# Patient Record
Sex: FEMALE | Race: WHITE | NOT HISPANIC OR LATINO | Employment: FULL TIME | URBAN - METROPOLITAN AREA
[De-identification: names, ages, dates, MRNs, and addresses within clinical notes are randomized per-mention and may not be internally consistent; named-entity substitution may affect disease eponyms.]

---

## 2017-01-04 ENCOUNTER — TELEPHONE (OUTPATIENT)
Dept: FAMILY MEDICINE | Facility: CLINIC | Age: 52
End: 2017-01-04

## 2017-01-04 NOTE — TELEPHONE ENCOUNTER
Please notify that the hep C virus was not detected in her system. It appears that she was exposed, and then cleared the infection.

## 2017-01-05 NOTE — TELEPHONE ENCOUNTER
Pt informed of test results.  Pt want to me to confirm with Dr. Nassar that based on her FSH that does mean she CAN NOT get pregnant.

## 2017-06-20 ENCOUNTER — OFFICE VISIT (OUTPATIENT)
Dept: OBSTETRICS AND GYNECOLOGY | Facility: CLINIC | Age: 52
End: 2017-06-20
Payer: COMMERCIAL

## 2017-06-20 VITALS
WEIGHT: 162.69 LBS | DIASTOLIC BLOOD PRESSURE: 78 MMHG | BODY MASS INDEX: 31.78 KG/M2 | SYSTOLIC BLOOD PRESSURE: 118 MMHG

## 2017-06-20 DIAGNOSIS — Z86.73 H/O: CVA (CEREBROVASCULAR ACCIDENT): ICD-10-CM

## 2017-06-20 DIAGNOSIS — Z12.4 ENCOUNTER FOR PAP SMEAR OF CERVIX WITH HPV DNA COTESTING: Primary | ICD-10-CM

## 2017-06-20 DIAGNOSIS — Z78.0 MENOPAUSE: ICD-10-CM

## 2017-06-20 PROCEDURE — 99396 PREV VISIT EST AGE 40-64: CPT | Mod: S$GLB,,, | Performed by: OBSTETRICS & GYNECOLOGY

## 2017-06-20 PROCEDURE — 88175 CYTOPATH C/V AUTO FLUID REDO: CPT

## 2017-06-20 PROCEDURE — 99999 PR PBB SHADOW E&M-EST. PATIENT-LVL III: CPT | Mod: PBBFAC,,, | Performed by: OBSTETRICS & GYNECOLOGY

## 2017-06-20 NOTE — LETTER
June 20, 2017      Emily Nassar MD  [address]  [facility] [address]           [facility] at [address], [address]  [facility] [address]  Phone: [phone]  Fax: [phone]          Patient: Janice Ramos   MR Number: [id number]   YOB: 1965   Date of Visit: 6/20/2017       Dear Dr. Emily Nassar:    Thank you for referring Janice Ramos to me for evaluation. Attached you will find relevant portions of my assessment and plan of care.    If you have questions, please do not hesitate to call me. I look forward to following Janice Ramos along with you.    Sincerely,    Loree Mesa MD    Enclosure  CC:  No Recipients    If you would like to receive this communication electronically, please contact [email or web address] or [phone] to request more information on [employer] access.    For providers and/or their staff who would like to refer a patient to [facility], please contact us through our one-stop-shop provider referral line, [facility], at [phone].    If you feel you have received this communication in error or would no longer like to receive these types of communications, please e-mail [email or web address]

## 2017-06-20 NOTE — PROGRESS NOTES
Chief Complaint   Patient presents with    [facility]    Well Woman     pap        History of Present Illness: Janice Ramos is a 52 y.o. female that presents today 6/20/2017 for well gyn visit.    Past Medical History:   Diagnosis Date    Anxiety     Bell's palsy 2007    R eye with residual deficit    Fatigue     Stroke 12/2014    Vertigo        Past Surgical History:   Procedure Laterality Date    TONSILLECTOMY         Current Outpatient Prescriptions   Medication Sig Dispense Refill    aspirin (ECOTRIN) 81 MG EC tablet Take 81 mg by mouth once daily.      atorvastatin (LIPITOR) 80 MG tablet Take 1 tablet (80 mg total) by mouth once daily. 90 tablet 1    cetirizine (ZYRTEC) 10 MG tablet Take 10 mg by mouth once daily.      escitalopram oxalate (LEXAPRO) 20 MG tablet TAKE ONE TABLET BY MOUTH ONCE DAILY 30 tablet 0    meclizine (ANTIVERT) 12.5 mg tablet Take 1 tablet (12.5 mg total) by mouth 3 (three) times daily as needed. 30 tablet 0    multivitamin (THERAGRAN) per tablet Take 1 tablet by mouth once daily.      b complex vitamins tablet Take 1 tablet by mouth once daily.      fluticasone (FLONASE) 50 mcg/actuation nasal spray 1 spray by Each Nare route once daily. 1 Bottle 11     No current facility-administered medications for this visit.        Review of patient's allergies indicates:   Allergen Reactions    Codeine Nausea Only       Family History   Problem Relation Age of Onset    Cancer Mother      cervical ca    Cancer Maternal Grandmother      colon ca    Heart disease Brother      arrythmia       Social History     Social History    Marital status:      Spouse name: N/A    Number of children: N/A    Years of education: N/A     Occupational History     [employer]     Social History Main Topics    Smoking status: Former Smoker    Smokeless tobacco: None    Alcohol use Yes    Drug use: No    Sexual activity: Yes     Partners: Male     Other Topics  Concern    None     Social History Narrative    Works in [employer] at [employer].       OB History    Para Term  AB Living   2 2    2   SAB TAB Ectopic Multiple Live Births             # Outcome Date GA Lbr Anthony/2nd Weight Sex Delivery Anes PTL Lv   2 Para      CS-Unspec      1 Para      CS-Unspec             Review of Symptoms:  GENERAL: Denies weight gain or weight loss. Feeling well overall.   SKIN: Denies rash or lesions.   HEAD: Denies head injury or headache.   NODES: Denies enlarged lymph nodes.   CHEST: Denies chest pain or shortness of breath.   CARDIOVASCULAR: Denies palpitations or left sided chest pain.   ABDOMEN: No abdominal pain, constipation, diarrhea, nausea, vomiting or rectal bleeding.   URINARY: No frequency, dysuria, hematuria, or burning on urination.  HEMATOLOGIC: No easy bruisability or excessive bleeding.   MUSCULOSKELETAL: Denies joint pain or swelling.     /78   Wt 73.8 kg (162 lb 11.2 oz)   LMP 2013   Physical Exam:  APPEARANCE: Well nourished, well developed, in no acute distress.  SKIN: Normal skin turgor, no lesions.  NECK: Neck symmetric without masses   RESPIRATORY: Normal respiratory effort with no retractions or use of accessory muscles  CARDIOVASCULAR: Peripheral vascular system with no swelling no varicosities and palpation of pulses normal  LYMPHATIC: No enlargements of the lymph nodes noted in the neck, axillae, or groin  ABDOMEN: Soft. No tenderness or masses. No hepatosplenomegaly. No hernias.  BREASTS: Symmetrical, no skin changes or visible lesions. No palpable masses, nipple discharge or adenopathy bilaterally.  PELVIC: Normal external female genitalia without lesions. Normal hair distribution. Adequate perineal body, normal urethral meatus. Urethra with no masses.  Bladder nontender. Vagina moist and well rugated without lesions or discharge. Cervix pink and without lesions. No significant cystocele or rectocele. Bimanual exam showed uterus  normal size, shape, position, mobile and nontender. Adnexa without masses or tenderness. Urethra and bladder normal. PAP DONE  EXTREMITIES: No clubbing cyanosis or edema.    ASSESSMENT/PLAN:  Encounter for Pap smear of cervix with HPV DNA cotesting  -     Liquid-based pap smear, screening  -     HPV High Risk Genotypes, PCR    H/O: CVA (cerebrovascular accident)    Menopause          Patient was counseled today on Pap guidelines, recommendation for pelvic exams, mammograms every other year after the age of 40 and annually after the age of 50, Colonoscopy after the age of 50, Dexa Bone Scan and calcium and vitamin D supplementation in menopause and to see her PCP for other health maintenance.   FOLLOW-UP:prn

## 2017-10-02 RX ORDER — ESCITALOPRAM OXALATE 20 MG/1
TABLET ORAL
Qty: 30 TABLET | Refills: 0 | Status: SHIPPED | OUTPATIENT
Start: 2017-10-02 | End: 2018-07-12 | Stop reason: SDUPTHER

## 2018-01-15 NOTE — LETTER
January 23, 2018    Janice Ramos  [address] [facility] [id number] [place] [address]  [address] [facility] [address]  Phone: [phone]  Fax: [phone] Dear Mrs. Ramos:    We have been trying to contact you to schedule follow up appointment. Please contact the office at [phone] to schedule.       If you have any questions or concerns, please don't hesitate to call.    Sincerely,        Yuliana Gaytan MA

## 2018-01-16 RX ORDER — ESCITALOPRAM OXALATE 20 MG/1
TABLET ORAL
Qty: 30 TABLET | Refills: 1 | Status: SHIPPED | OUTPATIENT
Start: 2018-01-16 | End: 2018-07-12 | Stop reason: DRUGHIGH

## 2018-01-22 NOTE — TELEPHONE ENCOUNTER
Attempted to contact pt to schedule annual.     No answer;left message to return call to schedule.

## 2018-01-23 NOTE — TELEPHONE ENCOUNTER
Attempted to contact pt to inform her that she needs to schedule follow up apt before her next refill.     No answer; left message to return call.     3 attempts made.     How would you like to proceed?    Thank you

## 2018-03-20 RX ORDER — ATORVASTATIN CALCIUM 80 MG/1
TABLET, FILM COATED ORAL
Qty: 90 TABLET | Refills: 1 | OUTPATIENT
Start: 2018-03-20

## 2018-04-11 ENCOUNTER — TELEPHONE (OUTPATIENT)
Dept: ADMINISTRATIVE | Facility: HOSPITAL | Age: 53
End: 2018-04-11

## 2018-06-01 DIAGNOSIS — Z00.00 ROUTINE HEALTH MAINTENANCE: ICD-10-CM

## 2018-06-01 DIAGNOSIS — G47.10 EXCESSIVE SLEEPINESS: ICD-10-CM

## 2018-06-01 DIAGNOSIS — H54.61 VISION LOSS, RIGHT EYE: Primary | ICD-10-CM

## 2018-06-04 RX ORDER — ATORVASTATIN CALCIUM 80 MG/1
TABLET, FILM COATED ORAL
Qty: 90 TABLET | Refills: 1 | Status: SHIPPED | OUTPATIENT
Start: 2018-06-04 | End: 2019-07-30 | Stop reason: SDUPTHER

## 2018-06-05 NOTE — TELEPHONE ENCOUNTER
Attempted to contact pt to schedule labs prior to her follow up apt.     No answer; left message to return call to schedule.

## 2018-06-05 NOTE — TELEPHONE ENCOUNTER
----- Message from Kerrie Prather sent at 6/5/2018 10:04 AM CDT -----  Contact: Patient  Type:  Patient Returning Call    Who Called:Patient  Who Left Message for Patient:  Yuliana  Does the patient know what this is regarding?:  No  Best Call Back Number:    Additional Information:  Call to pod. No answer.

## 2018-06-25 ENCOUNTER — LAB VISIT (OUTPATIENT)
Dept: LAB | Facility: HOSPITAL | Age: 53
End: 2018-06-25
Attending: FAMILY MEDICINE
Payer: COMMERCIAL

## 2018-06-25 ENCOUNTER — OFFICE VISIT (OUTPATIENT)
Dept: FAMILY MEDICINE | Facility: CLINIC | Age: 53
End: 2018-06-25
Payer: COMMERCIAL

## 2018-06-25 VITALS
DIASTOLIC BLOOD PRESSURE: 78 MMHG | SYSTOLIC BLOOD PRESSURE: 120 MMHG | HEART RATE: 60 BPM | HEIGHT: 60 IN | WEIGHT: 143.63 LBS | TEMPERATURE: 98 F | RESPIRATION RATE: 16 BRPM | BODY MASS INDEX: 28.2 KG/M2

## 2018-06-25 DIAGNOSIS — Z00.00 ROUTINE HEALTH MAINTENANCE: ICD-10-CM
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
## 2018-06-25 LAB
ALBUMIN SERPL BCP-MCNC: 4.1 G/DL
ALP SERPL-CCNC: 125 U/L
ALT SERPL W/O P-5'-P-CCNC: 22 U/L
ANION GAP SERPL CALC-SCNC: 9 MMOL/L
AST SERPL-CCNC: 25 U/L
BASOPHILS # BLD AUTO: 0.04 K/UL
BASOPHILS NFR BLD: 0.5 %
BILIRUB SERPL-MCNC: 0.8 MG/DL
BUN SERPL-MCNC: 17 MG/DL
CALCIUM SERPL-MCNC: 10.3 MG/DL
CHLORIDE SERPL-SCNC: 106 MMOL/L
CHOLEST SERPL-MCNC: 176 MG/DL
CHOLEST/HDLC SERPL: 3.1 {RATIO}
CO2 SERPL-SCNC: 25 MMOL/L
CREAT SERPL-MCNC: 0.8 MG/DL
DIFFERENTIAL METHOD: NORMAL
EOSINOPHIL # BLD AUTO: 0.2 K/UL
EOSINOPHIL NFR BLD: 1.8 %
ERYTHROCYTE [DISTWIDTH] IN BLOOD BY AUTOMATED COUNT: 13.2 %
EST. GFR  (AFRICAN AMERICAN): >60 ML/MIN/1.73 M^2
EST. GFR  (NON AFRICAN AMERICAN): >60 ML/MIN/1.73 M^2
ESTIMATED AVG GLUCOSE: 88 MG/DL
GLUCOSE SERPL-MCNC: 72 MG/DL
HBA1C MFR BLD HPLC: 4.7 %
HCT VFR BLD AUTO: 44.9 %
HDLC SERPL-MCNC: 56 MG/DL
HDLC SERPL: 31.8 %
HGB BLD-MCNC: 14.5 G/DL
IMM GRANULOCYTES # BLD AUTO: 0.02 K/UL
IMM GRANULOCYTES NFR BLD AUTO: 0.2 %
LDLC SERPL CALC-MCNC: 104 MG/DL
LYMPHOCYTES # BLD AUTO: 1.8 K/UL
LYMPHOCYTES NFR BLD: 22.2 %
MCH RBC QN AUTO: 30.6 PG
MCHC RBC AUTO-ENTMCNC: 32.3 G/DL
MCV RBC AUTO: 95 FL
MONOCYTES # BLD AUTO: 0.7 K/UL
MONOCYTES NFR BLD: 8 %
NEUTROPHILS # BLD AUTO: 5.5 K/UL
NEUTROPHILS NFR BLD: 67.3 %
NONHDLC SERPL-MCNC: 120 MG/DL
NRBC BLD-RTO: 0 /100 WBC
PLATELET # BLD AUTO: 308 K/UL
PMV BLD AUTO: 11.1 FL
POTASSIUM SERPL-SCNC: 4 MMOL/L
PROT SERPL-MCNC: 7.5 G/DL
RBC # BLD AUTO: 4.74 M/UL
SODIUM SERPL-SCNC: 140 MMOL/L
TRIGL SERPL-MCNC: 80 MG/DL
TSH SERPL DL<=0.005 MIU/L-ACNC: 1.42 UIU/ML
WBC # BLD AUTO: 8.24 K/UL

## 2018-06-25 PROCEDURE — 83036 HEMOGLOBIN GLYCOSYLATED A1C: CPT

## 2018-06-25 PROCEDURE — 80053 COMPREHEN METABOLIC PANEL: CPT

## 2018-06-25 PROCEDURE — 99213 OFFICE O/P EST LOW 20 MIN: CPT | Mod: S$GLB,,, | Performed by: FAMILY MEDICINE

## 2018-06-25 PROCEDURE — 80061 LIPID PANEL: CPT

## 2018-06-25 PROCEDURE — 3008F BODY MASS INDEX DOCD: CPT | Mod: CPTII,S$GLB,, | Performed by: FAMILY MEDICINE

## 2018-06-25 PROCEDURE — 85025 COMPLETE CBC W/AUTO DIFF WBC: CPT

## 2018-06-25 PROCEDURE — 99999 PR PBB SHADOW E&M-EST. PATIENT-LVL III: CPT | Mod: PBBFAC,,, | Performed by: FAMILY MEDICINE

## 2018-06-25 PROCEDURE — 84443 ASSAY THYROID STIM HORMONE: CPT

## 2018-06-25 PROCEDURE — 36415 COLL VENOUS BLD VENIPUNCTURE: CPT | Mod: PN

## 2018-06-25 RX ORDER — DOXYCYCLINE 100 MG/1
100 CAPSULE ORAL 2 TIMES DAILY
Qty: 14 CAPSULE | Refills: 0 | Status: SHIPPED | OUTPATIENT
Start: 2018-06-25 | End: 2018-07-12

## 2018-06-25 NOTE — PROGRESS NOTES
Subjective:       Patient ID: Janice Ramos is a 53 y.o. female.    Chief Complaint: Insect Bite (tick bite x 2 days ago)    HPI  Patient with tick bite noticed on Saturday, was there for 1-2 days prior. Afebrile, no chills. Some tenderness to site, but otherwise ok.  No drainage. Redness has increased from when tick was removed.  Weight loss to date, 20# on nutrisystem.     Review of Systems   Constitutional: Negative for chills and fever.   Musculoskeletal: Positive for myalgias. Negative for arthralgias and joint swelling.   Skin: Positive for rash. Negative for pallor and wound.   Neurological: Negative for dizziness and light-headedness.       Objective:      Physical Exam   Constitutional: She is oriented to person, place, and time. She appears well-developed and well-nourished. No distress.   HENT:   Head: Normocephalic and atraumatic.   Eyes: Conjunctivae are normal. Right eye exhibits no discharge. Left eye exhibits no discharge. No scleral icterus.   Neck: Normal range of motion. Neck supple.   Cardiovascular: Normal rate.    Pulmonary/Chest: Effort normal. No respiratory distress.   Abdominal: Soft. She exhibits no distension.   Musculoskeletal: Normal range of motion. She exhibits no edema.   Neurological: She is alert and oriented to person, place, and time.   Skin: Skin is warm and dry. No rash noted.        Psychiatric: She has a normal mood and affect. Her behavior is normal.   Nursing note and vitals reviewed.        Tick bite, initial encounter  Side effects and precautions of medication use reviewed with patient, expressed understanding. No questions or concerns.   -     doxycycline (VIBRAMYCIN) 100 MG Cap; Take 1 capsule (100 mg total) by mouth 2 (two) times daily.  Dispense: 14 capsule; Refill: 0  Expected course sx tx incl otc med use reviewed. Notify MD if sx persist or worsen.   Add probiotic.

## 2018-07-12 ENCOUNTER — OFFICE VISIT (OUTPATIENT)
Dept: FAMILY MEDICINE | Facility: CLINIC | Age: 53
End: 2018-07-12
Payer: COMMERCIAL

## 2018-07-12 VITALS
OXYGEN SATURATION: 98 % | TEMPERATURE: 99 F | SYSTOLIC BLOOD PRESSURE: 128 MMHG | HEIGHT: 60 IN | BODY MASS INDEX: 28.11 KG/M2 | HEART RATE: 71 BPM | DIASTOLIC BLOOD PRESSURE: 72 MMHG | WEIGHT: 143.19 LBS

## 2018-07-12 DIAGNOSIS — T14.8XXA MUSCLE STRAIN: ICD-10-CM

## 2018-07-12 DIAGNOSIS — I63.9 CEREBROVASCULAR ACCIDENT (CVA), UNSPECIFIED MECHANISM: ICD-10-CM

## 2018-07-12 DIAGNOSIS — Z00.00 ROUTINE GENERAL MEDICAL EXAMINATION AT A HEALTH CARE FACILITY: Primary | ICD-10-CM

## 2018-07-12 DIAGNOSIS — R21 RASH: ICD-10-CM

## 2018-07-12 DIAGNOSIS — Z12.11 SCREEN FOR COLON CANCER: ICD-10-CM

## 2018-07-12 PROCEDURE — 99396 PREV VISIT EST AGE 40-64: CPT | Mod: S$GLB,,, | Performed by: FAMILY MEDICINE

## 2018-07-12 PROCEDURE — 99999 PR PBB SHADOW E&M-EST. PATIENT-LVL III: CPT | Mod: PBBFAC,,, | Performed by: FAMILY MEDICINE

## 2018-07-12 RX ORDER — ESCITALOPRAM OXALATE 5 MG/1
5 TABLET ORAL DAILY
Qty: 90 TABLET | Refills: 1 | Status: SHIPPED | OUTPATIENT
Start: 2018-07-12 | End: 2018-12-14 | Stop reason: DRUGHIGH

## 2018-07-12 RX ORDER — MUPIROCIN 20 MG/G
OINTMENT TOPICAL 2 TIMES DAILY
Qty: 30 G | Refills: 1 | Status: SHIPPED | OUTPATIENT
Start: 2018-07-12 | End: 2020-06-01

## 2018-07-12 NOTE — PROGRESS NOTES
Subjective:       Patient ID: Janice Ramos is a 53 y.o. female.    Chief Complaint: Annual Exam      Janice Ramos is in the office for her annual exam.    HPI  No updates to medical hx.  Past Medical History:   Diagnosis Date    Anxiety     Bell's palsy 2007    R eye with residual deficit    Fatigue     Stroke 12/2014    Vertigo      [facility] with [facility]. Overdue for f/u. Recalls last carotid us with 2013 hosp eval, stroke.   Was seen prev for tick bite. Completed doxy.    Recalls R leg with area of irritation c/w ringworm that increased in size when using lotrimin. +itching. Stable size currently, improved irritation with otc hc.  Mood good ok lexapro 5. Might do school-year adjustment.    Current Outpatient Prescriptions:     aspirin (ECOTRIN) 81 MG EC tablet, Take 81 mg by mouth once daily., Disp: , Rfl:     atorvastatin (LIPITOR) 80 MG tablet, TAKE ONE TABLET BY MOUTH ONCE DAILY (Patient taking differently: TAKE 40MG BY MOUTH EVERY DAY), Disp: 90 tablet, Rfl: 1    b complex vitamins tablet, Take 1 tablet by mouth once daily., Disp: , Rfl:     cetirizine (ZYRTEC) 10 MG tablet, Take 10 mg by mouth once daily., Disp: , Rfl:     escitalopram oxalate (LEXAPRO) 20 MG tablet, TAKE ONE TABLET BY MOUTH ONCE DAILY, Disp: 30 tablet, Rfl: 1    fluticasone (FLONASE) 50 mcg/actuation nasal spray, 1 spray by Each Nare route once daily., Disp: 1 Bottle, Rfl: 11    meclizine (ANTIVERT) 12.5 mg tablet, Take 1 tablet (12.5 mg total) by mouth 3 (three) times daily as needed., Disp: 30 tablet, Rfl: 0    multivitamin (THERAGRAN) per tablet, Take 1 tablet by mouth once daily., Disp: , Rfl:     The ASCVD Risk score (Alber TATA Jr., et al., 2013) failed to calculate for the following reasons:    The patient has a prior MI or stroke diagnosis     Lab Results   Component Value Date    HGBA1C 4.7 06/25/2018     Lab Results   Component Value Date    LDLCALC 104.0 06/25/2018    CREATININE 0.8 06/25/2018   Labs  5/2018 rev.    Review of Systems   Constitutional: Negative for activity change, fatigue and unexpected weight change.   HENT: Negative for congestion, hearing loss, postnasal drip (chronic), rhinorrhea, sinus pressure, sore throat and trouble swallowing.         On zyrtec   Eyes: Negative for discharge and visual disturbance.   Respiratory: Negative for apnea (neg psg in 3/2014), cough, shortness of breath and wheezing.    Cardiovascular: Negative for chest pain, palpitations and leg swelling.   Gastrointestinal: Negative for abdominal pain, blood in stool, constipation, diarrhea and vomiting.        No gerd c/o.   Endocrine: Negative for polydipsia and polyuria.   Genitourinary: Negative for difficulty urinating, dysuria, hematuria, menstrual problem, vaginal bleeding, vaginal discharge and vaginal pain.        Infrequent UTIs.   Musculoskeletal: Negative for arthralgias, back pain, gait problem and joint swelling.   Skin: Positive for rash. Negative for color change.   Neurological: Negative for dizziness (improved), weakness and headaches.   Psychiatric/Behavioral: Positive for sleep disturbance (light sleeper). Negative for confusion and dysphoric mood.       Objective:      Physical Exam   Constitutional: She is oriented to person, place, and time. She appears well-developed and well-nourished. No distress.   HENT:   Head: Normocephalic and atraumatic.   Right Ear: External ear normal.   Left Ear: External ear normal.   Nose: Nose normal.   Mouth/Throat: Oropharynx is clear and moist. No oropharyngeal exudate.   Eyes: Conjunctivae and EOM are normal. Pupils are equal, round, and reactive to light.   Neck: Normal range of motion. Neck supple. No thyromegaly present.   Cardiovascular: Normal rate and regular rhythm.    Pulmonary/Chest: Effort normal and breath sounds normal. No respiratory distress. She has no wheezes.   Abdominal: Soft. Bowel sounds are normal. She exhibits no distension and no mass. There is  no tenderness. There is no rebound and no guarding.   Musculoskeletal: Normal range of motion. She exhibits no edema.   Lymphadenopathy:     She has no cervical adenopathy.   Neurological: She is alert and oriented to person, place, and time.   Skin: Skin is warm and dry. Rash (R shin with 2-3cm circular patch c/w with tinea, no indication of secondary inf; L shin 1mm abrasion) noted.   Psychiatric: She has a normal mood and affect. Her behavior is normal.   Nursing note and vitals reviewed.      Screening recommendations appropriate to age and health status were reviewed.    Routine general medical examination at a health care facility  Anticipatory guidance reviewed.  Screen for colon cancer  -     Case request GI: COLONOSCOPY  Rash  R: hc+lotrimin x 1 week, call if not improving.  L: bactroban  Cerebrovascular accident (CVA), unspecified mechanism  Stable, cont asa.  Muscle strain  chiro or massage at convenience. Recommend consider PT as well.   Other orders  -     mupirocin (BACTROBAN) 2 % ointment; Apply topically 2 (two) times daily.  Dispense: 30 g; Refill: 1  -     escitalopram oxalate (LEXAPRO) 5 MG Tab; Take 1 tablet (5 mg total) by mouth once daily.  Dispense: 90 tablet; Refill: 1

## 2018-11-20 ENCOUNTER — ANESTHESIA (OUTPATIENT)
Dept: ENDOSCOPY | Facility: HOSPITAL | Age: 53
End: 2018-11-20
Payer: COMMERCIAL

## 2018-11-20 ENCOUNTER — ANESTHESIA EVENT (OUTPATIENT)
Dept: ENDOSCOPY | Facility: HOSPITAL | Age: 53
End: 2018-11-20
Payer: COMMERCIAL

## 2018-11-20 ENCOUNTER — HOSPITAL ENCOUNTER (OUTPATIENT)
Facility: HOSPITAL | Age: 53
Discharge: HOME | End: 2018-11-20
Attending: INTERNAL MEDICINE | Admitting: INTERNAL MEDICINE
Payer: COMMERCIAL

## 2018-11-20 VITALS
HEIGHT: 60 IN | RESPIRATION RATE: 16 BRPM | HEART RATE: 70 BPM | DIASTOLIC BLOOD PRESSURE: 54 MMHG | OXYGEN SATURATION: 98 % | TEMPERATURE: 98 F | SYSTOLIC BLOOD PRESSURE: 92 MMHG | BODY MASS INDEX: 27.48 KG/M2 | WEIGHT: 140 LBS

## 2018-11-20 DIAGNOSIS — Z12.11 COLON CANCER SCREENING: ICD-10-CM

## 2018-11-20 PROCEDURE — 45385 COLONOSCOPY W/LESION REMOVAL: CPT | Mod: 33,,, | Performed by: INTERNAL MEDICINE

## 2018-11-20 PROCEDURE — D9220A PRA ANESTHESIA: Mod: 33,ANES,, | Performed by: ANESTHESIOLOGY

## 2018-11-20 PROCEDURE — D9220A PRA ANESTHESIA: Mod: 33,CRNA,, | Performed by: NURSE ANESTHETIST, CERTIFIED REGISTERED

## 2018-11-20 PROCEDURE — 37000009 HC ANESTHESIA EA ADD 15 MINS: Mod: PO | Performed by: INTERNAL MEDICINE

## 2018-11-20 PROCEDURE — 37000008 HC ANESTHESIA 1ST 15 MINUTES: Mod: PO | Performed by: INTERNAL MEDICINE

## 2018-11-20 PROCEDURE — 88305 TISSUE EXAM BY PATHOLOGIST: CPT | Mod: 26,,, | Performed by: PATHOLOGY

## 2018-11-20 PROCEDURE — 25000003 PHARM REV CODE 250: Mod: PO | Performed by: INTERNAL MEDICINE

## 2018-11-20 PROCEDURE — 63600175 PHARM REV CODE 636 W HCPCS: Mod: PO | Performed by: NURSE ANESTHETIST, CERTIFIED REGISTERED

## 2018-11-20 PROCEDURE — 88305 TISSUE EXAM BY PATHOLOGIST: CPT | Performed by: PATHOLOGY

## 2018-11-20 PROCEDURE — 45385 COLONOSCOPY W/LESION REMOVAL: CPT | Mod: PO | Performed by: INTERNAL MEDICINE

## 2018-11-20 PROCEDURE — 27201089 HC SNARE, DISP (ANY): Mod: PO | Performed by: INTERNAL MEDICINE

## 2018-11-20 RX ORDER — PROPOFOL 10 MG/ML
VIAL (ML) INTRAVENOUS
Status: DISCONTINUED | OUTPATIENT
Start: 2018-11-20 | End: 2018-11-20

## 2018-11-20 RX ORDER — LIDOCAINE HYDROCHLORIDE 10 MG/ML
1 INJECTION, SOLUTION EPIDURAL; INFILTRATION; INTRACAUDAL; PERINEURAL ONCE
Status: COMPLETED | OUTPATIENT
Start: 2018-11-20 | End: 2018-11-20

## 2018-11-20 RX ORDER — SODIUM CHLORIDE 0.9 % (FLUSH) 0.9 %
3 SYRINGE (ML) INJECTION
Status: DISCONTINUED | OUTPATIENT
Start: 2018-11-20 | End: 2018-11-20 | Stop reason: HOSPADM

## 2018-11-20 RX ORDER — LIDOCAINE HCL/PF 100 MG/5ML
SYRINGE (ML) INTRAVENOUS
Status: DISCONTINUED | OUTPATIENT
Start: 2018-11-20 | End: 2018-11-20

## 2018-11-20 RX ORDER — SODIUM CHLORIDE, SODIUM LACTATE, POTASSIUM CHLORIDE, CALCIUM CHLORIDE 600; 310; 30; 20 MG/100ML; MG/100ML; MG/100ML; MG/100ML
INJECTION, SOLUTION INTRAVENOUS CONTINUOUS
Status: DISCONTINUED | OUTPATIENT
Start: 2018-11-20 | End: 2018-11-20 | Stop reason: HOSPADM

## 2018-11-20 RX ADMIN — PROPOFOL 50 MG: 10 INJECTION, EMULSION INTRAVENOUS at 11:11

## 2018-11-20 RX ADMIN — PROPOFOL 25 MG: 10 INJECTION, EMULSION INTRAVENOUS at 11:11

## 2018-11-20 RX ADMIN — SODIUM CHLORIDE, SODIUM LACTATE, POTASSIUM CHLORIDE, AND CALCIUM CHLORIDE: .6; .31; .03; .02 INJECTION, SOLUTION INTRAVENOUS at 10:11

## 2018-11-20 RX ADMIN — PROPOFOL 125 MG: 10 INJECTION, EMULSION INTRAVENOUS at 11:11

## 2018-11-20 RX ADMIN — LIDOCAINE HYDROCHLORIDE 1 MG: 10 INJECTION, SOLUTION EPIDURAL; INFILTRATION; INTRACAUDAL; PERINEURAL at 10:11

## 2018-11-20 RX ADMIN — LIDOCAINE HYDROCHLORIDE 100 MG: 20 INJECTION, SOLUTION INTRAVENOUS at 11:11

## 2018-11-20 NOTE — DISCHARGE INSTRUCTIONS
Recovery After Procedural Sedation (Adult)  You have been given medicine by vein to make you sleep during your surgery. This may have included both a pain medicine and sleeping medicine. Most of the effects have worn off. But you may still have some drowsiness for the next 6 to 8 hours.  Home care  Follow these guidelines when you get home:  · For the next 8 hours, you should be watched by a responsible adult. This person should make sure your condition is not getting worse.  · Don't drink any alcohol for the next 24 hours.  · Don't drive, operate dangerous machinery, or make important business or personal decisions during the next 24 hours.  Note: Your healthcare provider may tell you not to take any medicine by mouth for pain or sleep in the next 4 hours. These medicines may react with the medicines you were given in the hospital. This could cause a much stronger response than usual.  Follow-up care  Follow up with your healthcare provider if you are not alert and back to your usual level of activity within 12 hours.  When to seek medical advice  Call your healthcare provider right away if any of these occur:  · Drowsiness gets worse  · Weakness or dizziness gets worse  · Repeated vomiting  · You can't be awakened   Date Last Reviewed: 10/18/2016  © 1270-1166 [employer]. [address] [place] [address], [place] [address]. All rights reserved. This information is not intended as a substitute for professional medical care. Always follow your healthcare professional's instructions.      PROBIOTICS:  Now that your colon is so cleaned out, now is a good time for a round of PROBIOTICS.  Eat a container of Greek Yogurt, such as OIKOS or CHOBANI,  Or Activia or Dannon    Greek Yogurt.    Or Take a similar Probiotic product such as Align or Culturelle or Shama-Q, every day for a month.                  (The products listed are non-prescription, but you may need to ask the pharmacist for their location.)  Repeat  this 4-6 times a year.        High-Fiber Diet  Fiber is in fruits, vegetables, cereals, and grains. Fiber passes through your body undigested. A high-fiber diet helps food move through your intestinal tract. The added bulk is helpful in preventing constipation. In people with diverticulosis, fiber helps clean out the pouches along the colon wall. It also prevents new pouches from forming. A high-fiber diet reduces the risk of colon cancer. It also lowers blood cholesterol and prevents high blood sugar in people with diabetes.    The fiber-rich foods listed below should be part of your diet. If you are not used to high-fiber foods, start with 1 or 2 foods from this list. Every 3 to 4 days add a new one to your diet. Do this until you are eating 4 high-fiber foods per day. This should give you 20 to 35 grams of fiber a day. It is also important to drink a lot of water when you are on this diet. You should have 6 to 8 glasses of water a day. Water makes the fiber swell and increases the benefit.  Foods high in dietary fiber  The following foods are high in dietary fiber:  · Breads. Breads made with 100% whole-wheat flour; maria luisa, wheat, or rye crackers; whole-grain tortillas, bran muffins.  · Cereals. Whole-grain and bran cereals with bran (shredded wheat, wheat flakes, raisin bran, corn bran); oatmeal, rolled oats, granola, and brown rice.  · Fruits. Fresh fruits and their edible skins (pears, prunes, raisins, berries, apples, and apricots); bananas, citrus fruit, mangoes, pineapple; and prune juice.  · Nuts. Any nuts and seeds.  · Vegetables. Best served raw or lightly cooked. All types, especially: green peas, celery, eggplant, potatoes, spinach, broccoli, [place] sprouts, winter squash, carrots, cauliflower, soybeans, lentils, and fresh and dried beans of all kinds.  · Other. Popcorn, any spices.  Date Last Reviewed: 8/1/2016  © 5423-1769 [employer]. [address] [place] [address], [place] [address]. All  rights reserved. This information is not intended as a substitute for professional medical care. Always follow your healthcare professional's instructions.

## 2018-11-20 NOTE — ANESTHESIA POSTPROCEDURE EVALUATION
Anesthesia Post Evaluation    Patient: Janice Ramos    Procedure(s) Performed: Procedure(s) (LRB):  COLONOSCOPY (N/A)    Final Anesthesia Type: general  Patient location during evaluation: PACU  Patient participation: Yes- Able to Participate  Level of consciousness: awake and alert, oriented and awake  Post-procedure vital signs: reviewed and stable  Pain management: adequate  Airway patency: patent  PONV status at discharge: No PONV  Anesthetic complications: no      Cardiovascular status: blood pressure returned to baseline and hemodynamically stable  Respiratory status: unassisted, spontaneous ventilation and room air  Hydration status: euvolemic  Follow-up not needed.        Visit Vitals  BP (!) 92/54 (BP Location: Left arm, Patient Position: Lying)   Pulse 70   Temp 36.5 °C (97.7 °F) (Skin)   Resp 16   Ht 5' (1.524 m)   Wt 63.5 kg (140 lb)   LMP 12/11/2013   SpO2 98%   Breastfeeding? No   BMI 27.34 kg/m²       Pain/Taran Score: Pain Assessment Performed: Yes (11/20/2018 12:14 PM)  Presence of Pain: denies (11/20/2018 12:14 PM)  Taran Score: 10 (11/20/2018 12:14 PM)

## 2018-11-20 NOTE — TRANSFER OF CARE
Anesthesia Transfer of Care Note    Patient: Janice Ramos    Procedure(s) Performed: Procedure(s) (LRB):  COLONOSCOPY (N/A)    Patient location: PACU    Anesthesia Type: general    Transport from OR: Transported from OR on room air with adequate spontaneous ventilation    Post pain: adequate analgesia    Post assessment: no apparent anesthetic complications and tolerated procedure well    Post vital signs: stable    Level of consciousness: awake    Nausea/Vomiting: no nausea/vomiting    Complications: none    Transfer of care protocol was followed      Last vitals:   Visit Vitals  /69 (BP Location: Right arm, Patient Position: Sitting)   Pulse 65   Temp 36.3 °C (97.3 °F) (Skin)   Resp 16   Ht 5' (1.524 m)   Wt 63.5 kg (140 lb)   LMP 12/11/2013   SpO2 98%   Breastfeeding? No   BMI 27.34 kg/m²

## 2018-11-20 NOTE — PLAN OF CARE
VSS. Questions answered to patient satisfaction. H&P needed. Operative consent needed. Patient ready for procedure.

## 2018-11-20 NOTE — BRIEF OP NOTE
Discharge Note  Short Stay      SUMMARY     Admit Date: 11/20/2018    Attending Physician: Lowell Bowen Jr., MD     Discharge Physician: Lowell Bowen Jr., MD    Discharge Date: 11/20/2018 12:10 PM    Final Diagnosis: Screen for colon cancer [Z12.11]  Impression:          - One 2 to 3 mm polyp in the cecum, removed with a                        cold snare. Resected and retrieved.                       - Redundant colon.                       - Non-bleeding internal hemorrhoids.                       - The examination was otherwise normal.                       - The examined portion of the ileum was normal.  Recommendation:      - Discharge patient to home.                       - Await pathology results.                       - If the pathology report reveals adenomatous                        tissue, then repeat the colonoscopy for surveillance                        in 5 years.                       - If the pathology report indicates hyperplastic                        polyp, then repeat colonoscopy for surveillance in 7                        years.                       - High fiber diet.                       - Use fiber, for example Citrucel, Fibercon, Konsyl                        or Metamucil.                       - Take a PROBIOTIC, such as a carton of GREEK YOGURT                        (Chobani or Oikos, or Activia or Dannon); or tablets                        of ALIGN or CULTURELLE or ELMER-Q (all                        non-prescription), every day for a month.                       - Call the G.[facility]. clinic in 2 weeks for reports (if                        you haven't heard from us sooner) [phone].                       .                       - !!! USE ADULT COLONOSCOPE FOR FUTURE EXAMS. !!!                       .                       - Continue present medications.                       - Resume aspirin at prior dose tomorrow.                       - Patient has a contact number available  for                        emergencies. The signs and symptoms of potential                        delayed complications were discussed with the                        patient. Return to normal activities tomorrow.                        Written discharge instructions were provided to the                        patient.                       - Return to normal activities tomorrow.  Lowell Bowen MD  11/20/2018 12:08:47 PM  Disposition: HOME OR SELF CARE    Patient Instructions:   Current Discharge Medication List      CONTINUE these medications which have NOT CHANGED    Details   aspirin (ECOTRIN) 81 MG EC tablet Take 81 mg by mouth once daily.      atorvastatin (LIPITOR) 80 MG tablet TAKE ONE TABLET BY MOUTH ONCE DAILY  Qty: 90 tablet, Refills: 1      b complex vitamins tablet Take 1 tablet by mouth once daily.      cetirizine (ZYRTEC) 10 MG tablet Take 10 mg by mouth once daily.      escitalopram oxalate (LEXAPRO) 5 MG Tab Take 1 tablet (5 mg total) by mouth once daily.  Qty: 90 tablet, Refills: 1      multivitamin (THERAGRAN) per tablet Take 1 tablet by mouth once daily.      fluticasone (FLONASE) 50 mcg/actuation nasal spray 1 spray by Each Nare route once daily.  Qty: 1 Bottle, Refills: 11    Associated Diagnoses: Vertigo; Allergic rhinitis due to pollen      meclizine (ANTIVERT) 12.5 mg tablet Take 1 tablet (12.5 mg total) by mouth 3 (three) times daily as needed.  Qty: 30 tablet, Refills: 0    Associated Diagnoses: Vertigo      mupirocin (BACTROBAN) 2 % ointment Apply topically 2 (two) times daily.  Qty: 30 g, Refills: 1      naproxen (NAPROSYN) 500 MG tablet Take 1 tablet (500 mg total) by mouth 2 (two) times daily with meals.  Qty: 14 tablet, Refills: 0      traMADol (ULTRAM) 50 mg tablet Take 1 tablet (50 mg total) by mouth every 6 (six) hours as needed for Pain (severe pain).  Qty: 5 tablet, Refills: 0             Discharge Procedure Orders (must include Diet, Follow-up, Activity)    Follow Up:   Follow up with PCP as per your routine.  Please follow a high fiber diet.  Activity as tolerated.    No driving day of procedure.    PROBIOTICS:  Now that your colon is so cleaned out, now is a good time for a round of PROBIOTICS.  Eat a container of Greek Yogurt, such as OIKOS or CHOBANI,  Or Activia or Dannon    Greek Yogurt.    Or Take a similar Probiotic product such as Align or Culturelle or Shama-Q, every day for a month.                  (The products listed are non-prescription, but you may need to ask the pharmacist for their location.)  Repeat this 4-6 times a year.

## 2018-11-20 NOTE — ANESTHESIA PREPROCEDURE EVALUATION
11/20/2018  Janice Ramos is a 53 y.o., female.    Anesthesia Evaluation    I have reviewed the Patient Summary Reports.    I have reviewed the Nursing Notes.      Review of Systems  Anesthesia Hx:  No problems with previous Anesthesia    Neurological:   CVA Neuromuscular Disease,        Physical Exam  General:  Well nourished    Airway/Jaw/Neck:  Airway Findings: Mouth Opening: Normal Tongue: Normal  Mallampati: II  TM Distance: Normal, at least 6 cm  Jaw/Neck Findings:  Neck ROM: Normal ROM     Eyes/Ears/Nose:  Eyes/Ears/Nose Findings:    Dental:  Dental Findings: In tact   Chest/Lungs:  Chest/Lungs Findings: Normal Respiratory Rate     Heart/Vascular:  Heart Findings: Rate: Normal  Rhythm: Regular Rhythm        Mental Status:  Mental Status Findings:  Cooperative, Alert and Oriented         Anesthesia Plan  Type of Anesthesia, risks & benefits discussed:  Anesthesia Type:  general  Patient's Preference: General  Intra-op Monitoring Plan: standard ASA monitors  Intra-op Monitoring Plan Comments:   Post Op Pain Control Plan:   Post Op Pain Control Plan Comments:   Induction:   IV  Beta Blocker:  Patient is not currently on a Beta-Blocker (No further documentation required).       Informed Consent: Patient understands risks and agrees with Anesthesia plan.  Questions answered. Anesthesia consent signed with patient.  ASA Score: 2     Day of Surgery Review of History & Physical:    H&P update referred to the surgeon.         Ready For Surgery From Anesthesia Perspective.

## 2018-11-20 NOTE — [employer] PATIENT INSTRUCTIONS
Discharge Summary/Instructions for after Colonoscopy with   Biopsy/Polypectomy  Janice Ramos    Tuesday, November 20, 2018  Lowell Bowen MD  RESTRICTIONS ON ACTIVITY:  - Do not drive a car or operate machinery until the day after the procedure.      - The following day: return to full activity including work.  - For  3 days: No heavy lifting, straining or running.  - Diet: You can have solid foods, but no gassy foods (i.e. beans, broccoli,   cabbage, etc).  TREATMENT FOR COMMON SIDE EFFECTS:  - Mild abdominal pain and bloating or excessive gas: rest, eat lightly and   use a heating pad.  SYMPTOMS TO WATCH FOR AND REPORT TO YOUR PHYSICIAN:  1. Severe abdominal pain.  2. Fever within 24 hours after a procedure.  3. A large amount of rectal bleeding. (A small amount of blood from the   rectum is not serious, especially if hemorrhoids are present.  3.  Because air was put into your colon during the procedure, expelling   large amounts of air from your rectum is normal.  4.  You may not have a bowel movement for 1-3 days because of the   colonoscopy prep.  This is normal.  5.  Call immediately if you notice any of the following:   Chills and/or fever over 101   Persistent vomiting   Severe abdominal pain, other than gas cramps   Severe chest pain   Black, tarry stools   Any bleeding - exceeding one tablespoon  Your doctor recommends these additional instructions:  We are waiting for your pathology results.   If the pathology report reveals adenomatous (precancerous) tissue, then your   physician recommends a repeat colonoscopy for surveillance in 5 years.   If the pathology report indicates a hyperplastic polyp, then the colonoscopy   will be repeated for surveillance in 7-8 years.   Eat a high fiber diet.   Take a fiber supplement, for example Citrucel, Fibercon, Konsyl or   Metamucil.   Take a PROBIOTIC, such as a carton of GREEK YOGURT (Chobani or Oikos,  or   Activia or Dannon);  or tablets of ALIGN or  CULTURELLE or ELMER-Q (all   non-prescription), every day for a month.   And repeat this 3-4 times a   year.  Call the G[facility] clinic in 2 weeks for reports (if you haven't heard from us   sooner)  [phone].  None  If you have any questions or problems, please call your physician.  EMERGENCY PHONE NUMBER: [phone]  LAB RESULTS: Call in two (2) weeks for lab results, [phone]  ___________________________________________  Nurse Signature  ___________________________________________  Patient/Designated Responsible Party Signature  Lowell Bowen MD  11/20/2018 12:08:47 PM  This report has been verified and signed electronically.  [employer]

## 2018-11-20 NOTE — H&P
History & Physical - Short Stay  Gastroenterology      SUBJECTIVE:     Procedure: Colonoscopy    Chief Complaint/Indication for Procedure: Screening    History of Present Illness:  Asymptomatic  Office Visit     7/12/2018  [facility]      Emily Nassar MD   Family Medicine   Routine general medical examination at a health care facility +4 more   Dx   Annual Exam   Reason for Visit    Progress Notes        Subjective:       Patient ID: Janice Ramos is a 53 y.o. female.     Chief Complaint: Annual Exam        Janice Ramos is in the office for her annual exam.     HPI  No updates to medical hx.       Past Medical History:   Diagnosis Date    Anxiety      Bell's palsy 2007     R eye with residual deficit    Fatigue      Stroke 12/2014    Vertigo        [facility] with [facility]. Overdue for f/u. Recalls last carotid us with 2013 hosp eval, stroke.   Was seen prev for tick bite. Completed doxy.               Recalls R leg with area of irritation c/w ringworm that increased in size when using lotrimin. +itching. Stable size currently, improved irritation with otc hc.  Mood good ok lexapro 5. Might do school-year adjustment.     Screening recommendations appropriate to age and health status were reviewed.     Routine general medical examination at a health care facility  Anticipatory guidance reviewed.  Screen for colon cancer  -     Case request GI: COLONOSCOPY  Rash  R: hc+lotrimin x 1 week, call if not improving.  L: bactroban  Cerebrovascular accident (CVA), unspecified mechanism  Stable, cont asa.  Muscle strain  chiro or massage at convenience. Recommend consider PT as well.   Other orders  -     mupirocin (BACTROBAN) 2 % ointment; Apply topically 2 (two) times daily.  Dispense: 30 g; Refill: 1  -     escitalopram oxalate (LEXAPRO) 5 MG Tab; Take 1 tablet (5 mg total) by mouth once daily.  Dispense: 90 tablet; Refill: 1             PTA Medications   Medication  Sig    aspirin (ECOTRIN) 81 MG EC tablet Take 81 mg by mouth once daily.    atorvastatin (LIPITOR) 80 MG tablet TAKE ONE TABLET BY MOUTH ONCE DAILY (Patient taking differently: TAKE 40MG BY MOUTH EVERY DAY)    b complex vitamins tablet Take 1 tablet by mouth once daily.    cetirizine (ZYRTEC) 10 MG tablet Take 10 mg by mouth once daily.    escitalopram oxalate (LEXAPRO) 5 MG Tab Take 1 tablet (5 mg total) by mouth once daily.    multivitamin (THERAGRAN) per tablet Take 1 tablet by mouth once daily.    fluticasone (FLONASE) 50 mcg/actuation nasal spray 1 spray by Each Nare route once daily.    meclizine (ANTIVERT) 12.5 mg tablet Take 1 tablet (12.5 mg total) by mouth 3 (three) times daily as needed.    mupirocin (BACTROBAN) 2 % ointment Apply topically 2 (two) times daily.    naproxen (NAPROSYN) 500 MG tablet Take 1 tablet (500 mg total) by mouth 2 (two) times daily with meals.    traMADol (ULTRAM) 50 mg tablet Take 1 tablet (50 mg total) by mouth every 6 (six) hours as needed for Pain (severe pain).       Review of patient's allergies indicates:   Allergen Reactions    Codeine Nausea Only        Past Medical History:   Diagnosis Date    Anxiety     Bell's palsy     R eye with residual deficit    Fatigue     Hypercholesteremia     Stroke 2014    Vertigo      Past Surgical History:   Procedure Laterality Date     SECTION      TONSILLECTOMY       Family History   Problem Relation Age of Onset    Cancer Mother         cervical ca    Heart disease Brother         arrythmia    Cancer Paternal Grandmother         colon cancer     Social History     Tobacco Use    Smoking status: Former Smoker     Last attempt to quit: 1998     Years since quittin.0    Smokeless tobacco: Never Used   Substance Use Topics    Alcohol use: Yes     Alcohol/week: 1.2 oz     Types: 2 Shots of liquor per week     Comment: social    Drug use: No         OBJECTIVE:     Vital Signs (Most  Recent)  Temp: 97.3 °F (36.3 °C) (11/20/18 1027)  Pulse: 65 (11/20/18 1027)  Resp: 16 (11/20/18 1027)  BP: 110/69 (11/20/18 1027)  SpO2: 98 % (11/20/18 1027)    Physical Exam:  : Ht 5' (1.524 m)   Wt 65 kg (143 lb 3.2 oz)   BMI 27.97 kg/m²                    GENERAL:  Comfortable, in no acute distress.                                 HEENT EXAM:  Nonicteric.  No adenopathy.  Oropharynx is clear.            NECK:  Supple.                                                               LUNGS:  Clear.                                                               CARDIAC:  Regular rate and rhythm.  S1, S2.  No murmur.                      ABDOMEN:  Soft, positive bowel sounds, nontender.  No hepatosplenomegaly or masses.  No rebound or guarding.                       EXTREMITIES:  No edema.     MENTAL STATUS:  Alert and oriented.    ASSESSMENT/PLAN:     Assessment: Colorectal cancer screening    Plan: Colonoscopy    Anesthesia Plan:   MAC / General Anaesthesia    ASA Grade: ASA 2 - Patient with mild systemic disease with no functional limitations    MALLAMPATI SCORE: I (soft palate, uvula, fauces, and tonsillar pillars visible)

## 2018-12-17 ENCOUNTER — OFFICE VISIT (OUTPATIENT)
Dept: FAMILY MEDICINE | Facility: CLINIC | Age: 53
End: 2018-12-17
Payer: OTHER MISCELLANEOUS

## 2018-12-17 VITALS
TEMPERATURE: 98 F | WEIGHT: 154.31 LBS | HEART RATE: 79 BPM | DIASTOLIC BLOOD PRESSURE: 72 MMHG | HEIGHT: 60 IN | BODY MASS INDEX: 30.29 KG/M2 | SYSTOLIC BLOOD PRESSURE: 110 MMHG | OXYGEN SATURATION: 98 %

## 2018-12-17 DIAGNOSIS — S83.92XA SPRAIN OF LEFT KNEE, UNSPECIFIED LIGAMENT, INITIAL ENCOUNTER: Primary | ICD-10-CM

## 2018-12-17 DIAGNOSIS — S83.8X2D SPRAIN OF MEDIAL MENISCUS OF LEFT KNEE, SUBSEQUENT ENCOUNTER: ICD-10-CM

## 2018-12-17 PROCEDURE — 99999 PR PBB SHADOW E&M-EST. PATIENT-LVL IV: CPT | Mod: PBBFAC,,, | Performed by: NURSE PRACTITIONER

## 2018-12-17 PROCEDURE — 99214 OFFICE O/P EST MOD 30 MIN: CPT | Mod: S$GLB,,, | Performed by: NURSE PRACTITIONER

## 2018-12-17 NOTE — LETTER
December 17, 2018      [facility]  [address]  [place] [address]  Phone: [phone]  Fax: [phone]       Patient: Janice Ramos   YOB: 1965  Date of Visit: 12/17/2018    To Whom It May Concern:    Kofi Ramos  was at [facility] on 12/17/2018. She may return to work/school on 12/18/18 with no restrictions. If you have any questions or concerns, or if I can be of further assistance, please do not hesitate to contact me. She has a follow up appointment for 2 weeks with me.     Sincerely,      Karel Jimenez NP

## 2018-12-17 NOTE — PATIENT INSTRUCTIONS
Knee Pain with Possible Torn Meniscus    The meniscus is a tough cartilage pad that cushions the inside of the knee joint. It helps absorb the shock from movement. It also spreads the weight of your body evenly across the knee joint. This prevents excess wear and tear to the bones of that joint.  The most common causes of meniscal tears are injuries, especially related to sports and degenerative disease that happens with aging.  A meniscus tear commonly happens during a twisting injury when the knee is bent. This causes pain, swelling, reduced movement of the knee, and trouble walking. There may be popping, clicking, joint locking or inability to completely straighten the knee. Ligaments of the knee may also be injured.  A torn meniscus is diagnosed by physical exam and X-rays. In the case of a severe injury, the knee may be too painful to examine fully. A more accurate exam can be done after the initial swelling goes down. An MRI may be ordered to make a final diagnosis.  If your healthcare provider suspects a meniscal injury, you will treat your knee with ice and rest and preventing movement of the knee. A splint or knee brace that keeps your leg straight may be put on to protect the joint. Depending on the severity of the injury, surgery may be needed. A cartilage injury may take 4-12 weeks to heal depending on how bad it is.  Home care  · Stay off the injured leg as much as possible until you can walk on it without pain. If you have a lot of pain when walking, crutches or a walker may be prescribed. (These can be rented or bought at many pharmacies and surgical or orthopedic supply stores). Follow your healthcare provider's advice about when to begin putting weight on that leg.  · Keep your leg elevated to reduce pain and swelling. When sleeping, place a pillow under the injured leg. When sitting, support the injured leg so it is level with your waist. This is very important during the first 48 hours.  · Apply  an ice pack over the injured area for 15 to 20 minutes every 3 to 6 hours. You should do this for the first 24 to 48 hours. You can make an ice pack by filling a plastic bag that seals at the top with ice cubes and then wrapping it with a thin towel. Continue to use ice packs for relief of pain and swelling as needed. As the ice melts, be careful to avoid getting your wrap, splint, or cast wet. After 48 hours, apply heat (warm shower or warm bath) for 15 to 20 minutes several times a day, or alternate ice and heat. You can place the ice pack directly over the splint. If you have to wear a hook-and-loop knee brace, you can open it to apply the ice pack, or heat, directly to the knee. Never put ice directly on the skin. Always wrap the ice in a towel or other type of cloth.  · You may use over-the-counter pain medicine to control pain, unless another pain medicine was prescribed. If you have chronic liver or kidney disease or ever had a stomach ulcer, talk with your healthcare provider before using these medicines.  · If you were given a splint, keep it dry at all times. Bathe with your splint out of the water. Protect it with a large plastic bag that is rubber-banded at the top end. If a fiberglass splint gets wet, you can dry it with a hair dryer. If you have a hook-and-loop knee brace, you can remove this to bathe, unless told otherwise.  · Check with your healthcare provider before returning to sports or full work duties.  Follow-up care  Follow up with your healthcare provider, or as advised. This is usually within 1-2 weeks. Further testing may be required to check the extent of your injury.  If X-rays were taken, you will be told of any new findings that may affect your care.  Call 911  Call 911 if you have:   · Shortness of breath  · Chest pain  When to seek medical advice  Call your healthcare provider right away if any of these occur:  · Toes or foot gets swollen, cold, blue, numb, or tingly  · Pain or  swelling spreads over the knee or calf  · Warmth or redness appears over the knee or calf  · Fever of 100.4°F (38°C) or above lasting for 24 to 48 hours  Date Last Reviewed: 11/23/2015  © 9521-3063 [employer]. [address] [place] [address]. All rights reserved. This information is not intended as a substitute for professional medical care. Always follow your healthcare professional's instructions.

## 2018-12-17 NOTE — PROGRESS NOTES
This dictation has been generated using Modal Fluency Dictation some phonetic errors may occur. Please contact author for clarification if needed.     Problem List Items Addressed This Visit     None      Visit Diagnoses     Sprain of left knee, unspecified ligament, initial encounter    -  Primary    Sprain of medial meniscus of left knee, subsequent encounter            Sprain of left knee likely meniscus involvement without complete tear.  I would like to monitor her for another 2 weeks.  We will allow her to continue walking without the brace.  She is going to avoid exercise.  She can return to work without restrictions.    Follow-up in about 2 weeks (around 12/31/2018).    ________________________________________________________________  ________________________________________________________________      Chief Complaint   Patient presents with    Follow-up     workers comp/fall on left knee     History of present illness  This 53 y.o. presents today for complaint of following up on left knee.  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  She had a slip and fall at work.  She did emergency room and get evaluated.  At that visit they had recommended a brace and applying ice.  She is not wearing the brace at today's visit.  She states she does need to go back to work without restrictions.  She still has some discomfort and mild swelling in the knee.  Currently weight-bearing.  Patient indicates a history of a slip and fall and her left knee was bent and right leg extended.  At the time of the injury she required help to get up off the floor.  After sitting for a bit she was able to bear weight on the knee but notes pain.  Today she was at the hospital with her  who unfortunately did require a bone marrow biopsy.  She states that while they were walking back and forth going to the appointment and such there pace was increased and she noticed the knee pain. She is walking in the clinic without a  limp today and wears regular notes.  She does not feel like the knee is going to give out.  She does note some mild discomfort still present in the knee.  She does note some mild swelling but notes dramatic improvement overall in all symptoms.  Review of systems  No chest pain or shortness of breath  No headaches.  No neck pain.  No numbness distal injury.  Past medical and social history reviewed.  Patient is new to me.  Follows with in the system.    Past Medical History:   Diagnosis Date    Anxiety     Bell's palsy     R eye with residual deficit    Fatigue     Hypercholesteremia     Stroke 2014    Vertigo        Past Surgical History:   Procedure Laterality Date     SECTION      COLONOSCOPY N/A 2018    Procedure: COLONOSCOPY;  Surgeon: Lowell Bowen Jr., MD;  Location: [facility] ENDO;  Service: Endoscopy;  Laterality: N/A;    COLONOSCOPY N/A 2018    Performed by Lowell Boewn Jr., MD at [facility] ENDO    TONSILLECTOMY         Family History   Problem Relation Age of Onset    Cancer Mother         cervical ca    Heart disease Brother         arrythmia    Cancer Paternal Grandmother         colon cancer       Social History     Socioeconomic History    Marital status:      Spouse name: None    Number of children: None    Years of education: None    Highest education level: None   Social Needs    Financial resource strain: None    Food insecurity - worry: None    Food insecurity - inability: None    Transportation needs - medical: None    Transportation needs - non-medical: None   Occupational History     Employer: [email or web address]   Tobacco Use    Smoking status: Former Smoker     Last attempt to quit: 1998     Years since quittin.0    Smokeless tobacco: Never Used   Substance and Sexual Activity    Alcohol use: Yes     Alcohol/week: 1.2 oz     Types: 2 Shots of liquor per week     Comment: social    Drug use: No    Sexual activity: Yes      Partners: Male   Other Topics Concern    None   Social History Narrative    Works in [employer] at [employer].       Current Outpatient Medications   Medication Sig Dispense Refill    aspirin (ECOTRIN) 81 MG EC tablet Take 81 mg by mouth once daily.      atorvastatin (LIPITOR) 80 MG tablet TAKE ONE TABLET BY MOUTH ONCE DAILY (Patient taking differently: TAKE 40MG BY MOUTH EVERY DAY) 90 tablet 1    b complex vitamins tablet Take 1 tablet by mouth once daily.      cetirizine (ZYRTEC) 10 MG tablet Take 10 mg by mouth once daily.      escitalopram oxalate (LEXAPRO) 20 MG tablet Take 1 tablet by mouth once daily.      multivitamin (THERAGRAN) per tablet Take 1 tablet by mouth once daily.      fluticasone (FLONASE) 50 mcg/actuation nasal spray 1 spray by Each Nare route once daily. 1 Bottle 11    meclizine (ANTIVERT) 12.5 mg tablet Take 1 tablet (12.5 mg total) by mouth 3 (three) times daily as needed. 30 tablet 0    mupirocin (BACTROBAN) 2 % ointment Apply topically 2 (two) times daily. 30 g 1    naproxen (NAPROSYN) 500 MG tablet Take 1 tablet (500 mg total) by mouth 2 (two) times daily with meals. for 7 days 14 tablet 0     No current facility-administered medications for this visit.        Review of patient's allergies indicates:   Allergen Reactions    Codeine Nausea Only       Physical examination  Vitals Reviewed  Gen. Well-dressed well-nourished   Skin warm dry and intact.  No rashes noted.  Chest.  Respirations are even unlabored.    Neuro. Awake alert oriented x4.  Normal judgment and cognition noted.  Extremities no clubbing cyanosis or edema noted. Left knee exam inspection unremarkable.  No abrasion noted. No bruising noted.  Trace effusion noted below the knee cap.  Stable knee examination with no evidence of lateral collateral medial collateral ligament laxity.  She does have some discomfort with examination.  Anterior drawer is negative.  The examination does not reveal any laxity or popping  when testing the meniscus however she again does note some tenderness.    Call or return to clinic prn if these symptoms worsen or fail to improve as anticipated.

## 2018-12-18 RX ORDER — ESCITALOPRAM OXALATE 20 MG/1
TABLET ORAL
Qty: 30 TABLET | Refills: 1 | Status: SHIPPED | OUTPATIENT
Start: 2018-12-18 | End: 2020-08-13 | Stop reason: SDUPTHER

## 2019-01-04 ENCOUNTER — OFFICE VISIT (OUTPATIENT)
Dept: FAMILY MEDICINE | Facility: CLINIC | Age: 54
End: 2019-01-04
Payer: COMMERCIAL

## 2019-01-04 VITALS
DIASTOLIC BLOOD PRESSURE: 72 MMHG | HEIGHT: 60 IN | WEIGHT: 152.56 LBS | HEART RATE: 70 BPM | SYSTOLIC BLOOD PRESSURE: 110 MMHG | BODY MASS INDEX: 29.95 KG/M2

## 2019-01-04 DIAGNOSIS — M25.562 ACUTE PAIN OF LEFT KNEE: Primary | ICD-10-CM

## 2019-01-04 DIAGNOSIS — S83.8X2D SPRAIN OF MEDIAL MENISCUS OF LEFT KNEE, SUBSEQUENT ENCOUNTER: ICD-10-CM

## 2019-01-04 PROCEDURE — 99999 PR PBB SHADOW E&M-EST. PATIENT-LVL IV: ICD-10-PCS | Mod: PBBFAC,,, | Performed by: NURSE PRACTITIONER

## 2019-01-04 PROCEDURE — 99213 OFFICE O/P EST LOW 20 MIN: CPT | Mod: S$GLB,,, | Performed by: NURSE PRACTITIONER

## 2019-01-04 PROCEDURE — 99999 PR PBB SHADOW E&M-EST. PATIENT-LVL IV: CPT | Mod: PBBFAC,,, | Performed by: NURSE PRACTITIONER

## 2019-01-04 PROCEDURE — 99213 PR OFFICE/OUTPT VISIT, EST, LEVL III, 20-29 MIN: ICD-10-PCS | Mod: S$GLB,,, | Performed by: NURSE PRACTITIONER

## 2019-01-04 NOTE — PROGRESS NOTES
This dictation has been generated using Modal Fluency Dictation some phonetic errors may occur. Please contact author for clarification if needed.     Problem List Items Addressed This Visit     None        Sprain of left knee likely meniscus involvement without complete tear.  I have offered her physical therapy which she would like to defer at this time.  She is interested in trying the home exercises and this is a reasonable approach.  We can re-evaluate her in 1 month and anticipate release unless there is a setback.    Follow-up in about 1 month (around 2/4/2019).    ________________________________________________________________  ________________________________________________________________      No chief complaint on file.    History of present illness  This 53 y.o. presents today for complaint of following up on left knee.  Seeing this patient in follow-up.  Knee has improved somewhat since our last visit however symptoms do linger.  I had hoped to release her today.  Her symptoms are still lingering.  Activity exacerbates.  Continues to care for her  and notes that walking at the hospital and such has exacerbated symptoms.  Overall notes improvement.  Took meds as directed.  She states it feels like the knee will give out.  The knee has not given out while walking.  She does note pain with 1st steps.  Patient notes going up and down stairs exacerbates but going down his worse.  She can ride the elevator at work.  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  She had a slip and fall at work.  She did emergency room and get evaluated.  At that visit they had recommended a brace and applying ice.  She is not wearing the brace at today's visit.  She states she does need to go back to work without restrictions.  She still has some discomfort and mild swelling in the knee.  Currently weight-bearing.  Patient indicates a history of a slip and fall and her left knee was bent and  right leg extended.  At the time of the injury she required help to get up off the floor.  After sitting for a bit she was able to bear weight on the knee but notes pain.  Today she was at the hospital with her  who unfortunately did require a bone marrow biopsy.  She states that while they were walking back and forth going to the appointment and such there pace was increased and she noticed the knee pain. She is walking in the clinic without a limp today and wears regular notes.  She does not feel like the knee is going to give out.  She does note some mild discomfort still present in the knee.  She does note some mild swelling but notes dramatic improvement overall in all symptoms.  Review of systems  No chest pain or shortness of breath  No headaches.  No neck pain.  No numbness distal injury.  Past medical and social history reviewed.  Patient is new to me.  Follows with in the system.    Past Medical History:   Diagnosis Date    Anxiety     Bell's palsy     R eye with residual deficit    Fatigue     Hypercholesteremia     Stroke 2014    Vertigo        Past Surgical History:   Procedure Laterality Date     SECTION      COLONOSCOPY N/A 2018    [facility] ENDO    TONSILLECTOMY         Family History   Problem Relation Age of Onset    Cancer Mother         cervical ca    Heart disease Brother         arrythmia    Cancer Paternal Grandmother         colon cancer       Social History     Socioeconomic History    Marital status:      Spouse name: None    Number of children: None    Years of education: None    Highest education level: None   Social Needs    Financial resource strain: None    Food insecurity - worry: None    Food insecurity - inability: None    Transportation needs - medical: None    Transportation needs - non-medical: None   Occupational History     Employer: [employer]   Tobacco Use    Smoking  status: Former Smoker     Last attempt to quit: 1998     Years since quittin.1    Smokeless tobacco: Never Used   Substance and Sexual Activity    Alcohol use: Yes     Alcohol/week: 1.2 oz     Types: 2 Shots of liquor per week     Comment: social    Drug use: No    Sexual activity: Yes     Partners: Male   Other Topics Concern    None   Social History Narrative    Works in [employer] at [employer].       Current Outpatient Medications   Medication Sig Dispense Refill    aspirin (ECOTRIN) 81 MG EC tablet Take 81 mg by mouth once daily.      atorvastatin (LIPITOR) 80 MG tablet TAKE ONE TABLET BY MOUTH ONCE DAILY (Patient taking differently: TAKE 40MG BY MOUTH EVERY DAY) 90 tablet 1    b complex vitamins tablet Take 1 tablet by mouth once daily.      cetirizine (ZYRTEC) 10 MG tablet Take 10 mg by mouth once daily.      escitalopram oxalate (LEXAPRO) 20 MG tablet Take 1 tablet by mouth once daily.      escitalopram oxalate (LEXAPRO) 20 MG tablet TAKE ONE TABLET BY MOUTH ONCE DAILY 30 tablet 1    fluticasone (FLONASE) 50 mcg/actuation nasal spray 1 spray by Each Nare route once daily. 1 Bottle 11    meclizine (ANTIVERT) 12.5 mg tablet Take 1 tablet (12.5 mg total) by mouth 3 (three) times daily as needed. 30 tablet 0    multivitamin (THERAGRAN) per tablet Take 1 tablet by mouth once daily.      mupirocin (BACTROBAN) 2 % ointment Apply topically 2 (two) times daily. 30 g 1     No current facility-administered medications for this visit.        Review of patient's allergies indicates:   Allergen Reactions    Codeine Nausea Only       Physical examination  Vitals Reviewed  Gen. Well-dressed well-nourished   Skin warm dry and intact.  No rashes noted.  Chest.  Respirations are even unlabored.    Neuro. Awake alert oriented x4.  Normal judgment and cognition noted.  Extremities no clubbing cyanosis or edema noted. Left knee exam inspection unremarkable.  No abrasion noted. No bruising noted.   Trace effusion noted below the knee cap unchanged.  Stable knee examination with no evidence of lateral collateral medial collateral ligament laxity.  She does have some discomfort with examination.  Anterior drawer is negative.  The examination does not reveal any laxity or popping when testing the meniscus however she again does note some tenderness.  No clicking noted.  ROM is limited due to pain. Pain when straightening the leg.     Call or return to clinic prn if these symptoms worsen or fail to improve as anticipated.

## 2019-01-04 NOTE — PATIENT INSTRUCTIONS
Leg and Knee Exercises: Leg Raise    This exercise is designed to stretch and strengthen your knee. Before beginning, read through all the instructions. While exercising, breathe normally and use smooth movements. If you feel any pain, stop the exercise. If pain persists, call your healthcare provider.  Caution  · Dont arch your back.  · Dont hunch your shoulders.   · Sit on the floor with your_________ leg straight, the other bent.  · Tighten the thigh muscles on the top of your straight leg. You should feel the muscles contract. Raise that leg 6-8 inches. Then lower it slowly and steadily to the floor. Relax.  · Repeat ______ times.  Do ______ sets a day.  Date Last Reviewed: 8/16/2015  © 5609-1073 [employer]. [address] [place] [address]. All rights reserved. This information is not intended as a substitute for professional medical care. Always follow your healthcare professional's instructions.        Heel Slides    This exercise is for an injured right knee. Switch sides if the injury is to your left knee.  1. Sit on the floor with your legs straight in front of you.  2. Slide your right heel along the floor toward you, bending your knee and keeping your foot flexed.  3. Move it as close to you as you comfortably can. Hold for 5 to 10 seconds. Then slide your heel back.  4. Repeat 5 times, or as instructed.     Tip: If youre sitting on carpet, put a plastic bag under your heel to make it slide more easily. If youre sitting on a hard floor, put a small towel under your heel.   Date Last Reviewed: 3/10/2016  © 2267-6443 [employer]. [address] [place] [address]. All rights reserved. This information is not intended as a substitute for professional medical care. Always follow your healthcare professional's instructions.        Hip Adductor Stretch (Flexibility)    5. Sit on the floor. Put the soles of your feet together so your knees are pointed  outward.  6. Pull your heels in toward your groin, as close as is comfortable.  7. Put your hands on your knees, and gently push them closer to the floor.  8. Hold for 30 to 60 seconds.  9. Relax and repeat 2 times, or as instructed.  10. Repeat this exercise 3 times a day, or as instructed.  Date Last Reviewed: 3/10/2016  © 6683-6660 [employer]. [address] [place] [address], [place] [address]. All rights reserved. This information is not intended as a substitute for professional medical care. Always follow your healthcare professional's instructions.        Exercise After Knee Replacement Surgery  Strong, flexible muscles help protect your knee. Improve your strength and increase your range of motion by doing the exercises shown here. Talk with your healthcare provider if doing your exercises causes new or lasting pain.    ? Build muscle strength  Strong thigh muscles reduce the amount of force placed on your knee. This helps the joint last longer.  Quad set  · Sit against the head of a bed. Place the leg with the new joint straight out in front of you.  · Tighten only the front thigh muscles. Then press the back of your leg toward the ground.  · Hold for a count of 5. Repeat as directed.    ? Improve joint motion  Range-of-motion exercises help your new knee bend more smoothly. Practice flexing and extending your knee as you were taught.  Sitting knee bends  · Sit in a chair with a towel under the new knee joint.  · Using your leg muscles, straighten your leg. Hold for a count of 5.  · Then bend your leg back as far as you can. Hold for a count of 5. Repeat as directed.  ? Walk to stay in shape  · Take a few short walks each day. Increase your walking time as you heal. Appropriate walking is one of the best ways to help your knee recover.  · If you feel more pain than usual after an activity, you may have overdone it. Take it a little easier for a few hours. If the pain doesn't resolve, contact your  healthcare provider.  It is important that your healthcare provider is aware of and supports any exercise routine after knee replacement surgery.  Date Last Reviewed: 9/20/2015  © 7567-7073 [employer]. [address] [place] [address]. All rights reserved. This information is not intended as a substitute for professional medical care. Always follow your healthcare professional's instructions.        Pre-Knee Replacement: Ankle Pumps, Quad Sets, Leg Raises  Doing these exercises BEFORE your knee replacement can help speed your recovery. Ask your physical therapist (PT) whether you should exercise one or both legs. Unless youre told otherwise, start by doing each exercise five to 10 times (5 to 10 reps) twice a day (2 sets). As you get stronger, slowly increase the number of reps and sets.  Stop any exercise that causes sharp or increased knee pain, dizziness, shortness of breath, or chest pain.   Ankle pumps    Ankle pumps can help prevent circulation problems, such as blood clots. Do ankle pumps by pointing and flexing your feet.  Quadriceps sets    · Lie on your back in bed, legs straight.  · Tighten the muscle at the front of the thigh as you press the back of your knee down toward the bed. Hold for a few seconds. Then relax the leg.  Straight leg raises    · Lie in bed. Bend one leg. Keep your other leg straight on the bed.  · Lift your straight leg as high as you comfortably can, but not higher than 12 inches. Hold for a few seconds. Then slowly lower the leg.  Date Last Reviewed: 10/1/2015  © 9907-5859 [employer]. [address] [place] [address]. All rights reserved. This information is not intended as a substitute for professional medical care. Always follow your healthcare professional's instructions.        ACL Rehabilitation: Stationary Bike    After you regain muscle control, its time to build strength. This improves your ability to put your full weight on  your leg. For best results, warm up and stretch before starting. If your injury is recent, wait until swelling and pain decrease before doing this exercise:  · Once you can move your leg through a full turn, slowly pedal for 5 to 10 minutes. Alternate between pedaling forward and backward.  · As your range of motion improves, pedal at a faster, steady pace.  · To increase your endurance, pedal a few minutes longer and at a higher intensity each day.  Date Last Reviewed: 10/1/2015  © 4960-3131 [employer]. [address] [place] [address]. All rights reserved. This information is not intended as a substitute for professional medical care. Always follow your healthcare professional's instructions.

## 2019-01-23 ENCOUNTER — PATIENT OUTREACH (OUTPATIENT)
Dept: ADMINISTRATIVE | Facility: HOSPITAL | Age: 54
End: 2019-01-23

## 2019-01-25 DIAGNOSIS — Z12.39 BREAST CANCER SCREENING: ICD-10-CM

## 2019-02-08 ENCOUNTER — PATIENT OUTREACH (OUTPATIENT)
Dept: ADMINISTRATIVE | Facility: HOSPITAL | Age: 54
End: 2019-02-08

## 2019-06-04 ENCOUNTER — PATIENT OUTREACH (OUTPATIENT)
Dept: ADMINISTRATIVE | Facility: HOSPITAL | Age: 54
End: 2019-06-04

## 2019-07-31 RX ORDER — ATORVASTATIN CALCIUM 80 MG/1
TABLET, FILM COATED ORAL
Qty: 90 TABLET | Refills: 1 | Status: SHIPPED | OUTPATIENT
Start: 2019-07-31 | End: 2020-04-20 | Stop reason: SDUPTHER

## 2019-08-01 ENCOUNTER — HOSPITAL ENCOUNTER (OUTPATIENT)
Dept: RADIOLOGY | Facility: HOSPITAL | Age: 54
Discharge: HOME | End: 2019-08-01
Attending: FAMILY MEDICINE
Payer: COMMERCIAL

## 2019-08-01 VITALS — WEIGHT: 152 LBS | BODY MASS INDEX: 29.84 KG/M2 | HEIGHT: 60 IN

## 2019-08-01 DIAGNOSIS — Z12.39 BREAST CANCER SCREENING: ICD-10-CM

## 2019-08-01 PROCEDURE — 77067 SCR MAMMO BI INCL CAD: CPT | Mod: 26,,, | Performed by: RADIOLOGY

## 2019-08-01 PROCEDURE — 77067 SCR MAMMO BI INCL CAD: CPT | Mod: TC,PO

## 2019-08-01 PROCEDURE — 77063 BREAST TOMOSYNTHESIS BI: CPT | Mod: 26,,, | Performed by: RADIOLOGY

## 2019-08-01 PROCEDURE — 77063 MAMMO DIGITAL SCREENING BILAT WITH TOMOSYNTHESIS_CAD: ICD-10-PCS | Mod: 26,,, | Performed by: RADIOLOGY

## 2019-08-01 PROCEDURE — 77067 MAMMO DIGITAL SCREENING BILAT WITH TOMOSYNTHESIS_CAD: ICD-10-PCS | Mod: 26,,, | Performed by: RADIOLOGY

## 2019-08-05 ENCOUNTER — TELEPHONE (OUTPATIENT)
Dept: RADIOLOGY | Facility: HOSPITAL | Age: 54
End: 2019-08-05

## 2019-08-06 ENCOUNTER — HOSPITAL ENCOUNTER (OUTPATIENT)
Dept: RADIOLOGY | Facility: HOSPITAL | Age: 54
Discharge: HOME | End: 2019-08-06
Attending: FAMILY MEDICINE
Payer: COMMERCIAL

## 2019-08-06 ENCOUNTER — TELEPHONE (OUTPATIENT)
Dept: RADIOLOGY | Facility: HOSPITAL | Age: 54
End: 2019-08-06

## 2019-08-06 DIAGNOSIS — R92.8 ABNORMAL MAMMOGRAM OF RIGHT BREAST: ICD-10-CM

## 2019-08-06 PROCEDURE — 77065 DX MAMMO INCL CAD UNI: CPT | Mod: 26,,, | Performed by: RADIOLOGY

## 2019-08-06 PROCEDURE — 76642 US BREAST RIGHT LIMITED: ICD-10-PCS | Mod: 26,RT,, | Performed by: RADIOLOGY

## 2019-08-06 PROCEDURE — 76642 ULTRASOUND BREAST LIMITED: CPT | Mod: TC,PO,RT

## 2019-08-06 PROCEDURE — 77061 MAMMO DIGITAL DIAGNOSTIC RIGHT WITH TOMOSYNTHESIS_CAD: ICD-10-PCS | Mod: 26,,, | Performed by: RADIOLOGY

## 2019-08-06 PROCEDURE — 77061 BREAST TOMOSYNTHESIS UNI: CPT | Mod: 26,,, | Performed by: RADIOLOGY

## 2019-08-06 PROCEDURE — 76642 ULTRASOUND BREAST LIMITED: CPT | Mod: 26,RT,, | Performed by: RADIOLOGY

## 2019-08-06 PROCEDURE — 77065 DX MAMMO INCL CAD UNI: CPT | Mod: TC,PO

## 2019-08-06 PROCEDURE — 77065 MAMMO DIGITAL DIAGNOSTIC RIGHT WITH TOMOSYNTHESIS_CAD: ICD-10-PCS | Mod: 26,,, | Performed by: RADIOLOGY

## 2019-08-06 NOTE — TELEPHONE ENCOUNTER
Void previous note.   No biopsy scheduled   ...Patient notified of mammogram results,   Diagnostic mammogram is scheduled on 8/6/2019

## 2019-08-06 NOTE — TELEPHONE ENCOUNTER
..Patient notified of diagnostic mammogram results. Right breast biopsy is scheduled on  8/12/2019

## 2019-10-15 ENCOUNTER — TELEPHONE (OUTPATIENT)
Dept: FAMILY MEDICINE | Facility: CLINIC | Age: 54
End: 2019-10-15

## 2019-10-15 NOTE — TELEPHONE ENCOUNTER
Rec'd refill request for escitalopram 5mg from [facility], [place]. Pt is past due for f/u appt. Also, Epic indicates pt was on 20mg. Tried to reach pt to confirm rx and to schedule f/u appt. No answer. Left msg for pt to call back.

## 2019-10-16 RX ORDER — ESCITALOPRAM OXALATE 20 MG/1
20 TABLET ORAL DAILY
Qty: 90 TABLET | Refills: 1 | Status: SHIPPED | OUTPATIENT
Start: 2019-10-16 | End: 2020-02-17 | Stop reason: SDUPTHER

## 2019-10-16 RX ORDER — ESCITALOPRAM OXALATE 5 MG/1
TABLET ORAL
Qty: 90 TABLET | Refills: 1 | OUTPATIENT
Start: 2019-10-16

## 2019-10-16 NOTE — TELEPHONE ENCOUNTER
Confirmed with pt that she is taking 20mg daily. She will call back to schedule appt, she works at an [employer] school.

## 2020-01-23 ENCOUNTER — HOSPITAL ENCOUNTER (OUTPATIENT)
Dept: RADIOLOGY | Facility: HOSPITAL | Age: 55
Discharge: HOME | End: 2020-01-23
Attending: PHYSICAL MEDICINE & REHABILITATION
Payer: COMMERCIAL

## 2020-01-23 DIAGNOSIS — R52 PAIN: ICD-10-CM

## 2020-01-23 PROCEDURE — 73552 XR FEMUR 2 VIEW LEFT: ICD-10-PCS | Mod: 26,LT,, | Performed by: RADIOLOGY

## 2020-01-23 PROCEDURE — 73552 X-RAY EXAM OF FEMUR 2/>: CPT | Mod: 26,RT,, | Performed by: RADIOLOGY

## 2020-01-23 PROCEDURE — 72170 X-RAY EXAM OF PELVIS: CPT | Mod: 26,,, | Performed by: RADIOLOGY

## 2020-01-23 PROCEDURE — 73552 X-RAY EXAM OF FEMUR 2/>: CPT | Mod: 26,LT,, | Performed by: RADIOLOGY

## 2020-01-23 PROCEDURE — 73552 X-RAY EXAM OF FEMUR 2/>: CPT | Mod: TC,PO,LT

## 2020-01-23 PROCEDURE — 72170 XR PELVIS ROUTINE AP: ICD-10-PCS | Mod: 26,,, | Performed by: RADIOLOGY

## 2020-01-23 PROCEDURE — 73552 XR FEMUR 2 VIEW RIGHT: ICD-10-PCS | Mod: 26,RT,, | Performed by: RADIOLOGY

## 2020-02-08 PROCEDURE — G0180 MD CERTIFICATION HHA PATIENT: HCPCS | Mod: ,,, | Performed by: FAMILY MEDICINE

## 2020-02-08 PROCEDURE — G0180 PR HOME HEALTH MD CERTIFICATION: ICD-10-PCS | Mod: ,,, | Performed by: FAMILY MEDICINE

## 2020-02-12 ENCOUNTER — TELEPHONE (OUTPATIENT)
Dept: FAMILY MEDICINE | Facility: CLINIC | Age: 55
End: 2020-02-12
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
## 2020-02-14 ENCOUNTER — TELEPHONE (OUTPATIENT)
Dept: FAMILY MEDICINE | Facility: CLINIC | Age: 55
End: 2020-02-14

## 2020-02-14 NOTE — TELEPHONE ENCOUNTER
----- Message from Emma Graham sent at 2/14/2020  4:23 PM CST -----  Type: Needs Medical Advice    Who Called: Anastasiya with [facility]    please call patient at [phone]   Additional Information: the patient has agreed to the appt time on 02/17/2020 at 11:40am. The encounter was a telephone call on 02/12/2020 that Dr. Nassar approved to scheduled. Please advise

## 2020-02-17 ENCOUNTER — OFFICE VISIT (OUTPATIENT)
Dept: FAMILY MEDICINE | Facility: CLINIC | Age: 55
End: 2020-02-17
Payer: COMMERCIAL

## 2020-02-17 ENCOUNTER — TELEPHONE (OUTPATIENT)
Dept: ORTHOPEDICS | Facility: CLINIC | Age: 55
End: 2020-02-17

## 2020-02-17 VITALS
BODY MASS INDEX: 29.45 KG/M2 | SYSTOLIC BLOOD PRESSURE: 110 MMHG | WEIGHT: 150 LBS | HEART RATE: 68 BPM | DIASTOLIC BLOOD PRESSURE: 68 MMHG | RESPIRATION RATE: 16 BRPM | HEIGHT: 60 IN

## 2020-02-17 DIAGNOSIS — R35.0 URINARY FREQUENCY: ICD-10-CM
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
## 2020-02-17 LAB
BACTERIA #/AREA URNS AUTO: ABNORMAL /HPF
BILIRUB UR QL STRIP: ABNORMAL
CAOX CRY UR QL COMP ASSIST: ABNORMAL
CLARITY UR REFRACT.AUTO: ABNORMAL
COLOR UR AUTO: ABNORMAL
GLUCOSE UR QL STRIP: NEGATIVE
HGB UR QL STRIP: ABNORMAL
KETONES UR QL STRIP: NEGATIVE
LEUKOCYTE ESTERASE UR QL STRIP: ABNORMAL
MICROSCOPIC COMMENT: ABNORMAL
NITRITE UR QL STRIP: POSITIVE
PH UR STRIP: 5 [PH] (ref 5–8)
PROT UR QL STRIP: NEGATIVE
RBC #/AREA URNS AUTO: 6 /HPF (ref 0–4)
SP GR UR STRIP: 1.03 (ref 1–1.03)
SQUAMOUS #/AREA URNS AUTO: 2 /HPF
URN SPEC COLLECT METH UR: ABNORMAL
WBC #/AREA URNS AUTO: >100 /HPF (ref 0–5)

## 2020-02-17 PROCEDURE — 87086 URINE CULTURE/COLONY COUNT: CPT

## 2020-02-17 PROCEDURE — 3008F PR BODY MASS INDEX (BMI) DOCUMENTED: ICD-10-PCS | Mod: CPTII,S$GLB,, | Performed by: FAMILY MEDICINE

## 2020-02-17 PROCEDURE — 99999 PR PBB SHADOW E&M-EST. PATIENT-LVL IV: ICD-10-PCS | Mod: PBBFAC,,, | Performed by: FAMILY MEDICINE

## 2020-02-17 PROCEDURE — 99999 PR PBB SHADOW E&M-EST. PATIENT-LVL IV: CPT | Mod: PBBFAC,,, | Performed by: FAMILY MEDICINE

## 2020-02-17 PROCEDURE — 99214 PR OFFICE/OUTPT VISIT, EST, LEVL IV, 30-39 MIN: ICD-10-PCS | Mod: S$GLB,,, | Performed by: FAMILY MEDICINE

## 2020-02-17 PROCEDURE — 99214 OFFICE O/P EST MOD 30 MIN: CPT | Mod: S$GLB,,, | Performed by: FAMILY MEDICINE

## 2020-02-17 PROCEDURE — 87186 SC STD MICRODIL/AGAR DIL: CPT

## 2020-02-17 PROCEDURE — 87077 CULTURE AEROBIC IDENTIFY: CPT

## 2020-02-17 PROCEDURE — 81001 URINALYSIS AUTO W/SCOPE: CPT

## 2020-02-17 PROCEDURE — 3008F BODY MASS INDEX DOCD: CPT | Mod: CPTII,S$GLB,, | Performed by: FAMILY MEDICINE

## 2020-02-17 PROCEDURE — 87088 URINE BACTERIA CULTURE: CPT

## 2020-02-17 RX ORDER — DICLOFENAC SODIUM 10 MG/G
2 GEL TOPICAL
COMMUNITY
Start: 2020-02-06

## 2020-02-17 RX ORDER — ONDANSETRON 4 MG/1
TABLET, ORALLY DISINTEGRATING ORAL
COMMUNITY
Start: 2020-01-15

## 2020-02-17 RX ORDER — METHOCARBAMOL 500 MG/1
TABLET, FILM COATED ORAL
COMMUNITY
Start: 2020-02-06 | End: 2020-02-17 | Stop reason: SDUPTHER

## 2020-02-17 RX ORDER — METHOCARBAMOL 500 MG/1
500 TABLET, FILM COATED ORAL 3 TIMES DAILY
Qty: 60 TABLET | Refills: 0 | Status: SHIPPED | OUTPATIENT
Start: 2020-02-17 | End: 2020-03-10 | Stop reason: SDUPTHER

## 2020-02-17 RX ORDER — POLYETHYLENE GLYCOL 3350 17 G/17G
POWDER, FOR SOLUTION ORAL
COMMUNITY
Start: 2020-01-15 | End: 2020-06-01

## 2020-02-17 RX ORDER — OXYCODONE HYDROCHLORIDE 5 MG/1
TABLET ORAL
COMMUNITY
Start: 2020-02-07 | End: 2020-02-17 | Stop reason: SDUPTHER

## 2020-02-17 RX ORDER — OXYCODONE HYDROCHLORIDE 5 MG/1
5 TABLET ORAL
Qty: 30 TABLET | Refills: 0 | Status: SHIPPED | OUTPATIENT
Start: 2020-02-17 | End: 2020-06-01 | Stop reason: SDUPTHER

## 2020-02-17 RX ORDER — APIXABAN 5 MG/1
TABLET, FILM COATED ORAL
COMMUNITY
Start: 2020-02-06 | End: 2020-05-10

## 2020-02-17 NOTE — Clinical Note
Hi, this is a mutual patient that I saw for f/u today. She is trying to schedule her post-op with your office and is having trouble getting a callback to schedule. Is there another number or extension she should call? Thanks, Emily Nassar MD
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
## 2020-02-18 ENCOUNTER — EXTERNAL HOME HEALTH (OUTPATIENT)
Dept: HOME HEALTH SERVICES | Facility: HOSPITAL | Age: 55
End: 2020-02-18
Payer: COMMERCIAL

## 2020-02-19 ENCOUNTER — TELEPHONE (OUTPATIENT)
Dept: HOME HEALTH SERVICES | Facility: HOSPITAL | Age: 55
End: 2020-02-19

## 2020-02-19 DIAGNOSIS — N30.00 ACUTE CYSTITIS WITHOUT HEMATURIA: Primary | ICD-10-CM

## 2020-02-20 LAB — BACTERIA UR CULT: ABNORMAL

## 2020-02-20 RX ORDER — AMOXICILLIN AND CLAVULANATE POTASSIUM 875; 125 MG/1; MG/1
1 TABLET, FILM COATED ORAL EVERY 12 HOURS
Qty: 14 TABLET | Refills: 0 | Status: SHIPPED | OUTPATIENT
Start: 2020-02-20 | End: 2020-05-10

## 2020-02-20 NOTE — TELEPHONE ENCOUNTER
+UTI. rx augmentin x 7 days. (printed for fax)   Take with probiotic. Increase water intake. Needs to be seen if febrile or not improving.

## 2020-03-02 ENCOUNTER — TELEPHONE (OUTPATIENT)
Dept: PSYCHIATRY | Facility: CLINIC | Age: 55
End: 2020-03-02

## 2020-03-02 NOTE — TELEPHONE ENCOUNTER
Contacted pt in regards to referral from Dr. Nassar, pt states that she feels she is doing okay at the moment and does not feel it is necessary to schedule an appt. Wcb if she thinks she needs to be seen, office number and direct ext provided. Pt verbalizes understanding with no further questions.  Thanks,  Brittany Maselli - MA [facility]   [phone] ext [phone]

## 2020-03-04 DIAGNOSIS — Z98.890 S/P ORIF (OPEN REDUCTION INTERNAL FIXATION) FRACTURE: ICD-10-CM

## 2020-03-04 DIAGNOSIS — S32.811A MULTIPLE FRACTURES OF PELVIS WITH UNSTABLE DISRUPTION OF PELVIC RING, INITIAL ENCOUNTER FOR CLOSED FRACTURE: Primary | ICD-10-CM

## 2020-03-04 DIAGNOSIS — Z87.81 S/P ORIF (OPEN REDUCTION INTERNAL FIXATION) FRACTURE: ICD-10-CM

## 2020-03-06 ENCOUNTER — PATIENT OUTREACH (OUTPATIENT)
Dept: ADMINISTRATIVE | Facility: OTHER | Age: 55
End: 2020-03-06

## 2020-03-10 ENCOUNTER — HOSPITAL ENCOUNTER (OUTPATIENT)
Dept: RADIOLOGY | Facility: HOSPITAL | Age: 55
Discharge: HOME | End: 2020-03-10
Attending: ORTHOPAEDIC SURGERY
Payer: COMMERCIAL

## 2020-03-10 ENCOUNTER — TELEPHONE (OUTPATIENT)
Dept: ORTHOPEDICS | Facility: CLINIC | Age: 55
End: 2020-03-10

## 2020-03-10 ENCOUNTER — OFFICE VISIT (OUTPATIENT)
Dept: ORTHOPEDICS | Facility: CLINIC | Age: 55
End: 2020-03-10
Payer: COMMERCIAL

## 2020-03-10 VITALS
BODY MASS INDEX: 29.45 KG/M2 | SYSTOLIC BLOOD PRESSURE: 121 MMHG | HEART RATE: 60 BPM | WEIGHT: 150 LBS | HEIGHT: 60 IN | DIASTOLIC BLOOD PRESSURE: 76 MMHG

## 2020-03-10 DIAGNOSIS — S32.811A MULTIPLE FRACTURES OF PELVIS WITH UNSTABLE DISRUPTION OF PELVIC RING, INITIAL ENCOUNTER FOR CLOSED FRACTURE: ICD-10-CM

## 2020-03-10 DIAGNOSIS — Z87.81 S/P ORIF (OPEN REDUCTION INTERNAL FIXATION) FRACTURE: ICD-10-CM

## 2020-03-10 DIAGNOSIS — Z98.890 S/P ORIF (OPEN REDUCTION INTERNAL FIXATION) FRACTURE: ICD-10-CM

## 2020-03-10 DIAGNOSIS — S32.811A MULTIPLE FRACTURES OF PELVIS WITH UNSTABLE DISRUPTION OF PELVIC RING, INITIAL ENCOUNTER FOR CLOSED FRACTURE: Primary | ICD-10-CM

## 2020-03-10 DIAGNOSIS — M70.62 GREATER TROCHANTERIC BURSITIS OF LEFT HIP: ICD-10-CM

## 2020-03-10 PROCEDURE — 3008F PR BODY MASS INDEX (BMI) DOCUMENTED: ICD-10-PCS | Mod: CPTII,S$GLB,, | Performed by: ORTHOPAEDIC SURGERY

## 2020-03-10 PROCEDURE — 99214 PR OFFICE/OUTPT VISIT, EST, LEVL IV, 30-39 MIN: ICD-10-PCS | Mod: 25,S$GLB,, | Performed by: ORTHOPAEDIC SURGERY

## 2020-03-10 PROCEDURE — 72170 X-RAY EXAM OF PELVIS: CPT | Mod: 26,59,, | Performed by: RADIOLOGY

## 2020-03-10 PROCEDURE — 99214 OFFICE O/P EST MOD 30 MIN: CPT | Mod: 25,S$GLB,, | Performed by: ORTHOPAEDIC SURGERY

## 2020-03-10 PROCEDURE — 72190 X-RAY EXAM OF PELVIS: CPT | Mod: TC,PO

## 2020-03-10 PROCEDURE — 72190 XR PELVIS COMPLETE MIN 3 VIEWS: ICD-10-PCS | Mod: 26,,, | Performed by: RADIOLOGY

## 2020-03-10 PROCEDURE — 99999 PR PBB SHADOW E&M-EST. PATIENT-LVL III: CPT | Mod: PBBFAC,,, | Performed by: ORTHOPAEDIC SURGERY

## 2020-03-10 PROCEDURE — 72170 XR PELVIS JUDET VIEWS: ICD-10-PCS | Mod: 26,59,, | Performed by: RADIOLOGY

## 2020-03-10 PROCEDURE — 99999 PR PBB SHADOW E&M-EST. PATIENT-LVL III: ICD-10-PCS | Mod: PBBFAC,,, | Performed by: ORTHOPAEDIC SURGERY

## 2020-03-10 PROCEDURE — 20610 DRAIN/INJ JOINT/BURSA W/O US: CPT | Mod: LT,S$GLB,, | Performed by: ORTHOPAEDIC SURGERY

## 2020-03-10 PROCEDURE — 72190 X-RAY EXAM OF PELVIS: CPT | Mod: 26,,, | Performed by: RADIOLOGY

## 2020-03-10 PROCEDURE — 3008F BODY MASS INDEX DOCD: CPT | Mod: CPTII,S$GLB,, | Performed by: ORTHOPAEDIC SURGERY

## 2020-03-10 PROCEDURE — 72170 X-RAY EXAM OF PELVIS: CPT | Mod: TC,59,PO

## 2020-03-10 PROCEDURE — 20610 LARGE JOINT ASPIRATION/INJECTION: L GREATER TROCHANTERIC BURSA: ICD-10-PCS | Mod: LT,S$GLB,, | Performed by: ORTHOPAEDIC SURGERY

## 2020-03-10 RX ORDER — OXYCODONE AND ACETAMINOPHEN 10; 325 MG/1; MG/1
1 TABLET ORAL EVERY 6 HOURS PRN
Qty: 44 TABLET | Refills: 0 | Status: SHIPPED | OUTPATIENT
Start: 2020-03-10 | End: 2020-05-10

## 2020-03-10 RX ORDER — METHOCARBAMOL 750 MG/1
750 TABLET, FILM COATED ORAL 3 TIMES DAILY PRN
Qty: 33 TABLET | Refills: 0 | Status: SHIPPED | OUTPATIENT
Start: 2020-03-10 | End: 2020-06-01 | Stop reason: SDUPTHER

## 2020-03-10 RX ADMIN — TRIAMCINOLONE ACETONIDE 40 MG: 40 INJECTION, SUSPENSION INTRA-ARTICULAR; INTRAMUSCULAR at 09:03

## 2020-03-10 NOTE — TELEPHONE ENCOUNTER
----- Message from Zen Frey sent at 3/10/2020 11:15 AM CDT -----  Contact: [facility]   Medical questions

## 2020-03-10 NOTE — TELEPHONE ENCOUNTER
Called pharmacy back. They wanted to verify that pt is post surgery, yes. And if she was prescribed any oxycodone after surgery, again yes. Thanks, Vivian

## 2020-03-10 NOTE — LETTER
March 16, 2020      Emily Nassar MD  [address]  [place] [address]           [facility]  [address] [place] [address]  Phone: [phone]          Patient: Janice Ramos   MR Number: [id number]   YOB: 1965   Date of Visit: 3/10/2020       Dear Dr. Emily Nassar:    Thank you for referring Janice Ramos to me for evaluation. Attached you will find relevant portions of my assessment and plan of care.    If you have questions, please do not hesitate to call me. I look forward to following Janice Ramos along with you.    Sincerely,    Bola Mera MD    Enclosure  CC:  No Recipients    If you would like to receive this communication electronically, please contact [email or web address] or [phone] to request more information on [employer] Link access.    For providers and/or their staff who would like to refer a patient to [facility], please contact us through our one-stop-shop provider referral line, [facility], at [phone].    If you feel you have received this communication in error or would no longer like to receive these types of communications, please e-mail [email or web address]
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
## 2020-03-10 NOTE — PROCEDURES
Large Joint Aspiration/Injection: L greater trochanteric bursa  Performed by: Bola Mera MD  Authorized by: Bola Mera MD  Date/Time: 3/10/2020 9:45 AM    Consent Done?:  Yes (Verbal)  Indications:  pain  Timeout: Immediately prior to procedure a time out was called to verify the correct patient, procedure, equipment, support staff and site/side marked as required.  Prep:Patient was prepped and draped in the usual sterile fashion.        Details:   Needle size: 21 G   Approach: lateral  Location:  Hip  Site:  L greater trochanteric bursa    Medications: 40 mg triamcinolone acetonide 40 mg/mL  Patient tolerance:  patient tolerated the procedure well with no immediate complications

## 2020-03-12 ENCOUNTER — PATIENT OUTREACH (OUTPATIENT)
Dept: ADMINISTRATIVE | Facility: OTHER | Age: 55
End: 2020-03-12

## 2020-03-16 RX ORDER — TRIAMCINOLONE ACETONIDE 40 MG/ML
40 INJECTION, SUSPENSION INTRA-ARTICULAR; INTRAMUSCULAR
Status: DISCONTINUED | OUTPATIENT
Start: 2020-03-10 | End: 2020-03-16 | Stop reason: HOSPADM

## 2020-03-27 ENCOUNTER — PATIENT MESSAGE (OUTPATIENT)
Dept: FAMILY MEDICINE | Facility: CLINIC | Age: 55
End: 2020-03-27

## 2020-04-20 ENCOUNTER — OFFICE VISIT (OUTPATIENT)
Dept: PRIMARY CARE CLINIC | Facility: CLINIC | Age: 55
End: 2020-04-20
Payer: COMMERCIAL

## 2020-04-20 ENCOUNTER — NURSE TRIAGE (OUTPATIENT)
Dept: ADMINISTRATIVE | Facility: CLINIC | Age: 55
End: 2020-04-20

## 2020-04-20 DIAGNOSIS — R50.9 FEVER, UNSPECIFIED FEVER CAUSE: ICD-10-CM

## 2020-04-20 DIAGNOSIS — J32.9 SINUSITIS, UNSPECIFIED CHRONICITY, UNSPECIFIED LOCATION: Primary | ICD-10-CM

## 2020-04-20 DIAGNOSIS — Z86.73 HISTORY OF STROKE: ICD-10-CM

## 2020-04-20 DIAGNOSIS — Z98.890 S/P ORIF (OPEN REDUCTION INTERNAL FIXATION) FRACTURE: ICD-10-CM

## 2020-04-20 DIAGNOSIS — J40 BRONCHITIS: ICD-10-CM

## 2020-04-20 DIAGNOSIS — R43.2 LOSS OF TASTE: ICD-10-CM

## 2020-04-20 DIAGNOSIS — Z87.81 S/P ORIF (OPEN REDUCTION INTERNAL FIXATION) FRACTURE: ICD-10-CM

## 2020-04-20 PROCEDURE — 99213 OFFICE O/P EST LOW 20 MIN: CPT | Mod: 95,,, | Performed by: FAMILY MEDICINE

## 2020-04-20 PROCEDURE — 99213 PR OFFICE/OUTPT VISIT, EST, LEVL III, 20-29 MIN: ICD-10-PCS | Mod: 95,,, | Performed by: FAMILY MEDICINE

## 2020-04-20 RX ORDER — ATORVASTATIN CALCIUM 80 MG/1
80 TABLET, FILM COATED ORAL DAILY
Qty: 90 TABLET | Refills: 1 | Status: SHIPPED | OUTPATIENT
Start: 2020-04-20 | End: 2021-07-08

## 2020-04-20 RX ORDER — AZITHROMYCIN 250 MG/1
TABLET, FILM COATED ORAL
Qty: 6 TABLET | Refills: 0 | Status: SHIPPED | OUTPATIENT
Start: 2020-04-20 | End: 2020-04-24
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
## 2020-04-20 NOTE — TELEPHONE ENCOUNTER
Reason for Disposition   [1] Fever (or feeling feverish) OR cough occurs AND [2] within 14 days of travel from a city or area with major community spread    Additional Information   Negative: SEVERE difficulty breathing (e.g., struggling for each breath, speak in single words, bluish lips)   Negative: Sounds like a life-threatening emergency to the triager   Negative: [1] Difficulty breathing occurs AND [2] within 14 days of COVID-19 EXPOSURE (Close Contact)   Negative: Patient sounds very sick or weak to the triager   Negative: [1] Fever (or feeling feverish) OR cough AND [2] within 14 Days of COVID-19 EXPOSURE (Close Contact)   Negative: [1] Fever (or feeling feverish) OR cough occurs AND [2] within 14 days of travel from another country (international travel)    Protocols used: CORONAVIRUS (COVID-19) EXPOSURE-A-AH    Pt stated she feels feverish, has chills and her entire body aches since Saturday. Pt agreed to a virtual visit. Care advice and instructions provided from Coronavirus triage protocol . Advised PES will contact her to schedule virtual visit and to call back if she is not contacted. Pt verbalized understanding.

## 2020-04-21 ENCOUNTER — LAB VISIT (OUTPATIENT)
Dept: PRIMARY CARE CLINIC | Facility: CLINIC | Age: 55
End: 2020-04-21
Payer: COMMERCIAL

## 2020-04-21 DIAGNOSIS — J40 BRONCHITIS: ICD-10-CM

## 2020-04-21 DIAGNOSIS — R50.9 FEVER, UNSPECIFIED FEVER CAUSE: ICD-10-CM

## 2020-04-21 DIAGNOSIS — R43.2 LOSS OF TASTE: ICD-10-CM

## 2020-04-21 PROCEDURE — U0002 COVID-19 LAB TEST NON-CDC: HCPCS

## 2020-04-22 LAB — SARS-COV-2 RNA RESP QL NAA+PROBE: NOT DETECTED

## 2020-05-10 ENCOUNTER — OFFICE VISIT (OUTPATIENT)
Dept: INTERNAL MEDICINE | Facility: CLINIC | Age: 55
End: 2020-05-10
Payer: COMMERCIAL

## 2020-05-10 ENCOUNTER — NURSE TRIAGE (OUTPATIENT)
Dept: ADMINISTRATIVE | Facility: CLINIC | Age: 55
End: 2020-05-10

## 2020-05-10 DIAGNOSIS — L03.116 CELLULITIS OF LEFT LEG: ICD-10-CM

## 2020-05-10 DIAGNOSIS — M79.89 LEFT LEG SWELLING: Primary | ICD-10-CM

## 2020-05-10 PROCEDURE — 99214 OFFICE O/P EST MOD 30 MIN: CPT | Mod: 95,,, | Performed by: NURSE PRACTITIONER

## 2020-05-10 PROCEDURE — 99214 PR OFFICE/OUTPT VISIT, EST, LEVL IV, 30-39 MIN: ICD-10-PCS | Mod: 95,,, | Performed by: NURSE PRACTITIONER

## 2020-05-10 RX ORDER — SULFAMETHOXAZOLE AND TRIMETHOPRIM 800; 160 MG/1; MG/1
1 TABLET ORAL 2 TIMES DAILY
Qty: 14 TABLET | Refills: 0 | Status: SHIPPED | OUTPATIENT
Start: 2020-05-10 | End: 2020-06-01

## 2020-05-10 NOTE — PATIENT INSTRUCTIONS
Bactrim sent to pharmacy.  Take as directed.    Left leg ultrasound ordered.  Call to try to schedule for tomorrow.    If unable to schedule ultrasound tomorrow, I recommend having an in person visit at your local clinic or an ER visit for further evaluation.      Continue to elevate your left leg.      Follow up with your primary care provider. May go to ER for new shortness of breath, chest pain, lightheadedness, fever, severe/new headaches or any other emergent complaints or changes in condition.
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
## 2020-05-10 NOTE — TELEPHONE ENCOUNTER
Spoke with pt:1/2020 left sided pelvic sx has had infections.     Left leg with swelling. No SOB. Entire leg--large goose egg to inner thigh. No open skin. No CP. No fever.     Pain in leg started Monday, had low grade 100F fever, no fever after 2 days and leg swelling improved; and now has returned.     Requesting antibiotics-does not want to go in to ED for evaluation.    Will schedule virtual visit. Pt verbalizes to wait for virtual visit appointment to be made.       Reason for Disposition   SEVERE leg swelling (e.g., swelling extends above knee, entire leg is swollen, weeping fluid)    Additional Information   Negative: [1] Difficulty breathing with exertion (e.g., walking) AND [2] new onset or worsening   Negative: [1] Red area or streak AND [2] fever   Negative: [1] Swelling is painful to touch AND [2] fever   Negative: Patient sounds very sick or weak to the triager   Negative: [1] Cast on leg or ankle AND [2] now increased pain   Negative: Severe difficulty breathing (e.g., struggling for each breath, speaks in single words)   Negative: Looks like a broken bone or dislocated joint (e.g., crooked or deformed)   Negative: Sounds like a life-threatening emergency to the triager   Negative: Difficulty breathing at rest   Negative: [1] Can't walk or can barely walk AND [2] new onset    Protocols used: LEG SWELLING AND EDEMA-A-AH

## 2020-05-11 ENCOUNTER — TELEPHONE (OUTPATIENT)
Dept: INTERNAL MEDICINE | Facility: CLINIC | Age: 55
End: 2020-05-11

## 2020-05-11 NOTE — TELEPHONE ENCOUNTER
All notified about a positive test for DVT.  Patient had a virtual visit yesterday for presumed cellulitis of extremity.  Ultrasound was ordered paste only and result came back today.  Radiologist did call to our nursing staff for triaged.  In reviewing the know, mention of thigh cellulitis was noted.  I did a return call, did not get an answer from radiology.  My recommendation would be for patient to go to ER.  I requested triage nurse that patient be seen in the emergency room do the proximal nature and fact we have not seen her face-to-face.  Patient agreed.

## 2020-05-11 NOTE — TELEPHONE ENCOUNTER
Received call from dr. Jimenez, he stated pt was +dvt study. He stated if he did not hear back from yovana norwood's office in 30 min, he would send pt to ER.    Yovana norwood is not available  Spoke with dr. Herman, gave him the u/s results.   He reviewed chart, stated for pt to go to ER.    Called jannie, [phone], explained that dr. Herman wants pt to go to ER.   Jannie will send pt to ER.

## 2020-05-12 ENCOUNTER — TELEPHONE (OUTPATIENT)
Dept: VASCULAR SURGERY | Facility: CLINIC | Age: 55
End: 2020-05-12

## 2020-05-12 NOTE — TELEPHONE ENCOUNTER
----- Message from Ivette Lopez sent at 5/12/2020  1:39 PM CDT -----  Contact: Pt  Type: Needs Medical Advice    Who Called:  pt  Best Call Back Number: [phone]  Additional Information: Requesting a call back regarding needs to schedule a hospital follow up for blood clot in leg  Please Advise ---Thank you

## 2020-05-13 NOTE — TELEPHONE ENCOUNTER
Left message for pt OK to proceed with physical therapy per Dr. Carrillo. Advised to continue blood thinners as directed.

## 2020-05-21 ENCOUNTER — CLINICAL SUPPORT (OUTPATIENT)
Dept: REHABILITATION | Facility: HOSPITAL | Age: 55
End: 2020-05-21
Payer: COMMERCIAL

## 2020-05-21 DIAGNOSIS — R29.898 DECREASED STRENGTH OF LOWER EXTREMITY: ICD-10-CM

## 2020-05-21 DIAGNOSIS — M25.652 DECREASED RANGE OF LEFT HIP MOVEMENT: ICD-10-CM

## 2020-05-21 DIAGNOSIS — R26.9 IMPAIRED GAIT: ICD-10-CM

## 2020-05-21 DIAGNOSIS — M53.86 DECREASED RANGE OF MOTION OF LUMBAR SPINE: ICD-10-CM

## 2020-05-21 DIAGNOSIS — R26.89 DECREASED FUNCTIONAL MOBILITY: ICD-10-CM

## 2020-05-21 PROCEDURE — 97161 PT EVAL LOW COMPLEX 20 MIN: CPT | Mod: PN

## 2020-05-22 PROBLEM — R29.898 DECREASED STRENGTH OF LOWER EXTREMITY: Status: ACTIVE | Noted: 2020-05-22

## 2020-05-22 PROBLEM — M53.86 DECREASED RANGE OF MOTION OF LUMBAR SPINE: Status: ACTIVE | Noted: 2020-05-22

## 2020-05-22 PROBLEM — R26.89 DECREASED FUNCTIONAL MOBILITY: Status: ACTIVE | Noted: 2020-05-22

## 2020-05-22 PROBLEM — R26.9 IMPAIRED GAIT: Status: ACTIVE | Noted: 2020-05-22

## 2020-05-22 PROBLEM — M25.652 DECREASED RANGE OF LEFT HIP MOVEMENT: Status: ACTIVE | Noted: 2020-05-22
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
## 2020-05-25 ENCOUNTER — TELEPHONE (OUTPATIENT)
Dept: FAMILY MEDICINE | Facility: CLINIC | Age: 55
End: 2020-05-25

## 2020-05-25 ENCOUNTER — CLINICAL SUPPORT (OUTPATIENT)
Dept: REHABILITATION | Facility: HOSPITAL | Age: 55
End: 2020-05-25
Payer: COMMERCIAL

## 2020-05-25 DIAGNOSIS — M53.86 DECREASED RANGE OF MOTION OF LUMBAR SPINE: ICD-10-CM

## 2020-05-25 DIAGNOSIS — R29.898 DECREASED STRENGTH OF LOWER EXTREMITY: ICD-10-CM

## 2020-05-25 DIAGNOSIS — M25.652 DECREASED RANGE OF LEFT HIP MOVEMENT: ICD-10-CM

## 2020-05-25 DIAGNOSIS — R26.9 IMPAIRED GAIT: ICD-10-CM

## 2020-05-25 DIAGNOSIS — R26.89 DECREASED FUNCTIONAL MOBILITY: ICD-10-CM

## 2020-05-25 PROCEDURE — 97110 THERAPEUTIC EXERCISES: CPT | Mod: PN

## 2020-05-25 PROCEDURE — 97116 GAIT TRAINING THERAPY: CPT | Mod: PN

## 2020-05-25 NOTE — TELEPHONE ENCOUNTER
Spoke with pt and let her know to take the maintenance dose. Pt verbalized understanding.   Pt has follow up appt with Dr. Nassar to reevaluate and see if she needs to see vascular.

## 2020-05-25 NOTE — TELEPHONE ENCOUNTER
----- Message from Janice Kay sent at 5/25/2020 10:47 AM CDT -----  Contact: Janice pt  Type:  Sooner Apoointment Request    Caller is requesting a sooner appointment.  Caller declined first available appointment listed below.  Caller will not accept being placed on the waitlist and is requesting a message be sent to doctor.    Name of Caller:  Janice  When is the first available appointment?  06/05/20  Symptoms:  Has a blot clot in her leg  Best Call Back Number:  [phone]  Additional Information:  Pls call pt for a sooner appt

## 2020-05-25 NOTE — PROGRESS NOTES
"  Physical Therapy Daily Treatment Note     Name: Janice Ramos  Clinic Number: [id number]    Therapy Diagnosis:   Encounter Diagnoses   Name Primary?    Decreased range of motion of lumbar spine     Decreased range of left hip movement     Decreased functional mobility     Impaired gait     Decreased strength of lower extremity      Physician: Emily Nassar MD    Visit Date: 5/25/2020    Physician Orders: PT Eval and Treat Begin PWB 25% x 2 weeks, then 50% x 2 weeks, then WBAT x 2 weeks; eval and treat 3x/wk x 6 wks- From 3/10 Referral   Medical Diagnosis from Referral: S32.811A (ICD-10-CM) - Multiple fractures of pelvis with unstable disruption of pelvic ring, initial encounter for closed fracture Z98.890,Z87.81 (ICD-10-CM) - S/P ORIF (open reduction internal fixation) fracture   Evaluation Date: 5/21/2020  Authorization Period Expiration: 03/10/2021  Plan of Care Expiration: 08/21/2020  Visit # / Visits authorized: 1/ 20 (1 prior)      Time In: 2:30   Time Out: 3:15  Total Billable Time: 45 minutes    Precautions: Standard, s/p pelvic ORIF, DVT LLE    Subjective     Pt reports: she did a lot of walking this weekend using her cane. Pt states she has continued pain to her L ankle due to increased swelling. Pt states she has been very uncomfortable over the past few days due to walking.   She was compliant with home exercise program.  Response to previous treatment: No change   Functional change: Increased walking     Pain: 3-4/10  Location: left hip      Objective     Janice received therapeutic exercises to develop strength, endurance, ROM, flexibility, posture and core stabilization for 30 minutes including:  Supine DKTC with ball x 2 min   Glut Sets 20 x 5" hold   Plevic Tilt with TA set 10 x 5" hold   Hooklying Add w/ PT x 10   BKFO RTB x 10    Scap Retraction x 15  Sit to stand x 10     (next visit: Standing hip abd/ext, TKE, shuttle)    Janice participated in gait training to improve functional " mobility and safety for 15  minutes, including:  Weight shifts in // bars with mirror for visual feedback - lateral, diagonal x 1 min each   Gait Training in crosswalk with RW x 3 laps verbal cues for heel strike and terminal knee extension  Gait Training in crosswalk with SPC x 3 laps       Pt deferred ice to home    Home Exercises Provided and Patient Education Provided     Education provided:   - New HEP   - Gait mechanics, use of cane at home and walker in community     Written Home Exercises Provided: Patient instructed to cont prior HEP.  Exercises were reviewed and Janice was able to demonstrate them prior to the end of the session.  Janice demonstrated good  understanding of the education provided.     See EMR under Patient Instructions for exercises provided 5/25/2020.    Assessment     Pt demonstrated good tolerance for treatment session today with mild muscle soreness with glut strengthening. Pt required min verbal cues during weight shifts and gait training for full quad contraction and knee extension at midstance. Pt demonstrated good stability during gait training with SPC; however, would be more appropriate with walker during community ambulation until gait deviations are minimized. Pt will benefit from continued closed chain strengthening next visit. Will progress as tolerated.     Janice is progressing well towards her goals.   Pt prognosis is Good.     Pt will continue to benefit from skilled outpatient physical therapy to address the deficits listed in the problem list box on initial evaluation, provide pt/family education and to maximize pt's level of independence in the home and community environment.     Pt's spiritual, cultural and educational needs considered and pt agreeable to plan of care and goals.     Anticipated barriers to physical therapy: None     Goals:  Short Term Goals: 6 weeks (progressing, not met)  Pt will report decreased hip and LE pain to 6/10 at worst to increase  tolerance for ADLs.   Pt will ambulate 200 feet with minimal to no gait deviations and least restrictive assistive device   Pt will increase L hip flexion by 10 degrees to increase tolerance for bed mobility and transfers.   Pt to tolerate HEP to improve ROM and independence with ADL's     Long Term Goals: 12 weeks (progressing, not met)  Pt will report > / = 30% decrease in difficulty tolerating prolonged standing in order to improve ability to perform household chores.   Increased lumbar flexion and extension ROM to 70% for increased ease with daily activities  Pt will ambulate 500 feet with minimal to no gait deviations and least restrictive AD to increase tolerance for community ambulation   Pt will be independent in all bathroom ADLs to reduce caregiver burden and improve pt quality of life.   Pt will be independent in HEP and symptom management.       Plan     Continue with POC towards goals. (next visit: Standing hip abd/ext, TKE, shuttle)    Paty Velez, PT

## 2020-05-28 ENCOUNTER — CLINICAL SUPPORT (OUTPATIENT)
Dept: REHABILITATION | Facility: HOSPITAL | Age: 55
End: 2020-05-28
Payer: COMMERCIAL

## 2020-05-28 DIAGNOSIS — M25.652 DECREASED RANGE OF LEFT HIP MOVEMENT: ICD-10-CM

## 2020-05-28 DIAGNOSIS — R26.9 IMPAIRED GAIT: ICD-10-CM

## 2020-05-28 DIAGNOSIS — M53.86 DECREASED RANGE OF MOTION OF LUMBAR SPINE: ICD-10-CM

## 2020-05-28 DIAGNOSIS — R26.89 DECREASED FUNCTIONAL MOBILITY: ICD-10-CM

## 2020-05-28 DIAGNOSIS — R29.898 DECREASED STRENGTH OF LOWER EXTREMITY: ICD-10-CM

## 2020-05-28 PROCEDURE — 97110 THERAPEUTIC EXERCISES: CPT | Mod: PN

## 2020-05-28 NOTE — PROGRESS NOTES
"  Physical Therapy Daily Treatment Note     Name: Janice Ramos  Clinic Number: [id number]    Therapy Diagnosis:   Encounter Diagnoses   Name Primary?    Decreased range of motion of lumbar spine     Decreased range of left hip movement     Decreased functional mobility     Impaired gait     Decreased strength of lower extremity      Physician: Emily Nassar MD    Visit Date: 5/28/2020    Physician Orders: PT Eval and Treat Begin PWB 25% x 2 weeks, then 50% x 2 weeks, then WBAT x 2 weeks; eval and treat 3x/wk x 6 wks- From 3/10 Referral   Medical Diagnosis from Referral: S32.811A (ICD-10-CM) - Multiple fractures of pelvis with unstable disruption of pelvic ring, initial encounter for closed fracture Z98.890,Z87.81 (ICD-10-CM) - S/P ORIF (open reduction internal fixation) fracture   Evaluation Date: 5/21/2020  Authorization Period Expiration: 03/10/2021  Plan of Care Expiration: 08/21/2020  Visit # / Visits authorized: 1/ 20 (1 prior)      Time In: 2:50  Time Out: 3:40  Total Billable Time: 50 minutes    Precautions: Standard, s/p pelvic ORIF, DVT LLE    Subjective     Pt reports: she was a little sore following prior treatment session. Pt reports increased urinary urgency since accident.   She was compliant with home exercise program.  Response to previous treatment: increased soreness   Functional change: Increased walking     Pain: 3-4/10  Location: left hip      Objective     Janice received therapeutic exercises to develop strength, endurance, ROM, flexibility, posture and core stabilization for 45 minutes including:  Supine DKTC with ball x 2 min   Glut Sets 20 x 5" hold   Plevic Tilt with TA set 10 x 5" hold   Hooklying Add w/ PT x 10   BKFO RTB x 10    SLR x 10   Scap Retraction x 20  Sit to stand x 10   TKE RTB 2 x 10   Standing Hip Abd/Ext x 10 each LE    (next visit: step ups 2")    Janice participated in gait training to improve functional mobility and safety for 5  minutes, " including:  (NP) Weight shifts in // bars with mirror for visual feedback - lateral, diagonal x 1 min each   (NP) Gait Training in crosswalk with RW x 3 laps verbal cues for heel strike and terminal knee extension  Gait Training in crosswalk with SPC x 3 laps       Pt deferred ice to home    Home Exercises Provided and Patient Education Provided     Education provided:   - New HEP   - Gait mechanics, use of cane at home and walker in community   - Scar Massage     Written Home Exercises Provided: Patient instructed to cont prior HEP.  Exercises were reviewed and Janice was able to demonstrate them prior to the end of the session.  Janice demonstrated good  understanding of the education provided.     See EMR under Patient Instructions for exercises provided 5/25/2020.    Assessment     Pt able to progress therex today to include introduction to closed chain strengthening. Pt required visual and tactile cues for appropriate pelvic symmetry during standing hip ext/abd, was independent following cues. Pt c/o increased tightness anteriorly across proximal thighs and abdomen during sit to stand exercise. Pt demonstrated improved gait mechanics with SPC today, continues to require cueing for terminal knee extension and heel strike. Will continue to progress as tolerated. Pt will benefit from hip flexor stretch next treatment session.     Janice is progressing well towards her goals.   Pt prognosis is Good.     Pt will continue to benefit from skilled outpatient physical therapy to address the deficits listed in the problem list box on initial evaluation, provide pt/family education and to maximize pt's level of independence in the home and community environment.     Pt's spiritual, cultural and educational needs considered and pt agreeable to plan of care and goals.     Anticipated barriers to physical therapy: None     Goals:  Short Term Goals: 6 weeks (progressing, not met)  Pt will report decreased hip and LE pain  to 6/10 at worst to increase tolerance for ADLs.   Pt will ambulate 200 feet with minimal to no gait deviations and least restrictive assistive device   Pt will increase L hip flexion by 10 degrees to increase tolerance for bed mobility and transfers.   Pt to tolerate HEP to improve ROM and independence with ADL's     Long Term Goals: 12 weeks (progressing, not met)  Pt will report > / = 30% decrease in difficulty tolerating prolonged standing in order to improve ability to perform household chores.   Increased lumbar flexion and extension ROM to 70% for increased ease with daily activities  Pt will ambulate 500 feet with minimal to no gait deviations and least restrictive AD to increase tolerance for community ambulation   Pt will be independent in all bathroom ADLs to reduce caregiver burden and improve pt quality of life.   Pt will be independent in HEP and symptom management.       Plan     Continue with POC towards goals.     Ptay Velez, PT

## 2020-06-01 ENCOUNTER — CLINICAL SUPPORT (OUTPATIENT)
Dept: REHABILITATION | Facility: HOSPITAL | Age: 55
End: 2020-06-01
Payer: COMMERCIAL

## 2020-06-01 ENCOUNTER — LAB VISIT (OUTPATIENT)
Dept: LAB | Facility: HOSPITAL | Age: 55
End: 2020-06-01
Attending: FAMILY MEDICINE
Payer: COMMERCIAL

## 2020-06-01 ENCOUNTER — OFFICE VISIT (OUTPATIENT)
Dept: FAMILY MEDICINE | Facility: CLINIC | Age: 55
End: 2020-06-01
Payer: COMMERCIAL

## 2020-06-01 VITALS
WEIGHT: 151.69 LBS | DIASTOLIC BLOOD PRESSURE: 78 MMHG | TEMPERATURE: 98 F | BODY MASS INDEX: 29.78 KG/M2 | HEIGHT: 60 IN | HEART RATE: 64 BPM | SYSTOLIC BLOOD PRESSURE: 112 MMHG

## 2020-06-01 DIAGNOSIS — I82.4Z2 LOWER LEG DVT (DEEP VENOUS THROMBOEMBOLISM), ACUTE, LEFT: ICD-10-CM

## 2020-06-01 DIAGNOSIS — R26.89 DECREASED FUNCTIONAL MOBILITY: ICD-10-CM

## 2020-06-01 DIAGNOSIS — Z86.73 HISTORY OF STROKE: ICD-10-CM

## 2020-06-01 DIAGNOSIS — R26.89 DECREASED FUNCTIONAL MOBILITY: Primary | ICD-10-CM

## 2020-06-01 DIAGNOSIS — R29.898 DECREASED STRENGTH OF LOWER EXTREMITY: ICD-10-CM

## 2020-06-01 DIAGNOSIS — Z87.81 S/P ORIF (OPEN REDUCTION INTERNAL FIXATION) FRACTURE: ICD-10-CM

## 2020-06-01 DIAGNOSIS — M53.86 DECREASED RANGE OF MOTION OF LUMBAR SPINE: ICD-10-CM

## 2020-06-01 DIAGNOSIS — M25.652 DECREASED RANGE OF LEFT HIP MOVEMENT: ICD-10-CM

## 2020-06-01 DIAGNOSIS — R26.9 IMPAIRED GAIT: ICD-10-CM

## 2020-06-01 DIAGNOSIS — Z98.890 S/P ORIF (OPEN REDUCTION INTERNAL FIXATION) FRACTURE: ICD-10-CM

## 2020-06-01 DIAGNOSIS — S32.811A MULTIPLE FRACTURES OF PELVIS WITH UNSTABLE DISRUPTION OF PELVIC RING, INITIAL ENCOUNTER FOR CLOSED FRACTURE: ICD-10-CM

## 2020-06-01 LAB
ALBUMIN SERPL BCP-MCNC: 3.9 G/DL (ref 3.5–5.2)
ALP SERPL-CCNC: 148 U/L (ref 55–135)
ALT SERPL W/O P-5'-P-CCNC: 15 U/L (ref 10–44)
ANION GAP SERPL CALC-SCNC: 9 MMOL/L (ref 8–16)
AST SERPL-CCNC: 16 U/L (ref 10–40)
BACTERIA #/AREA URNS AUTO: NORMAL /HPF
BILIRUB SERPL-MCNC: 0.6 MG/DL (ref 0.1–1)
BILIRUB UR QL STRIP: NEGATIVE
BUN SERPL-MCNC: 16 MG/DL (ref 6–20)
CALCIUM SERPL-MCNC: 9.8 MG/DL (ref 8.7–10.5)
CAOX CRY UR QL COMP ASSIST: NORMAL
CHLORIDE SERPL-SCNC: 102 MMOL/L (ref 95–110)
CHOLEST SERPL-MCNC: 195 MG/DL (ref 120–199)
CHOLEST/HDLC SERPL: 3.8 {RATIO} (ref 2–5)
CLARITY UR REFRACT.AUTO: CLEAR
CO2 SERPL-SCNC: 26 MMOL/L (ref 23–29)
COLOR UR AUTO: YELLOW
CREAT SERPL-MCNC: 0.8 MG/DL (ref 0.5–1.4)
ERYTHROCYTE [DISTWIDTH] IN BLOOD BY AUTOMATED COUNT: 14.5 % (ref 11.5–14.5)
EST. GFR  (AFRICAN AMERICAN): >60 ML/MIN/1.73 M^2
EST. GFR  (NON AFRICAN AMERICAN): >60 ML/MIN/1.73 M^2
GLUCOSE SERPL-MCNC: 77 MG/DL (ref 70–110)
GLUCOSE UR QL STRIP: NEGATIVE
HCT VFR BLD AUTO: 47 % (ref 37–48.5)
HDLC SERPL-MCNC: 52 MG/DL (ref 40–75)
HDLC SERPL: 26.7 % (ref 20–50)
HGB BLD-MCNC: 14.5 G/DL (ref 12–16)
HGB UR QL STRIP: ABNORMAL
KETONES UR QL STRIP: NEGATIVE
LDLC SERPL CALC-MCNC: 109.8 MG/DL (ref 63–159)
LEUKOCYTE ESTERASE UR QL STRIP: NEGATIVE
MCH RBC QN AUTO: 31 PG (ref 27–31)
MCHC RBC AUTO-ENTMCNC: 30.9 G/DL (ref 32–36)
MCV RBC AUTO: 101 FL (ref 82–98)
MICROSCOPIC COMMENT: NORMAL
NITRITE UR QL STRIP: NEGATIVE
NONHDLC SERPL-MCNC: 143 MG/DL
PH UR STRIP: 6 [PH] (ref 5–8)
PLATELET # BLD AUTO: 260 K/UL (ref 150–350)
PMV BLD AUTO: 11.1 FL (ref 9.2–12.9)
POTASSIUM SERPL-SCNC: 3.8 MMOL/L (ref 3.5–5.1)
PROT SERPL-MCNC: 7.5 G/DL (ref 6–8.4)
PROT UR QL STRIP: NEGATIVE
RBC # BLD AUTO: 4.67 M/UL (ref 4–5.4)
RBC #/AREA URNS AUTO: 3 /HPF (ref 0–4)
SODIUM SERPL-SCNC: 137 MMOL/L (ref 136–145)
SP GR UR STRIP: 1.02 (ref 1–1.03)
SQUAMOUS #/AREA URNS AUTO: 0 /HPF
T4 FREE SERPL-MCNC: 0.85 NG/DL (ref 0.71–1.51)
TRIGL SERPL-MCNC: 166 MG/DL (ref 30–150)
TSH SERPL DL<=0.005 MIU/L-ACNC: 2.54 UIU/ML (ref 0.4–4)
URN SPEC COLLECT METH UR: ABNORMAL
WBC # BLD AUTO: 7.97 K/UL (ref 3.9–12.7)
WBC #/AREA URNS AUTO: 1 /HPF (ref 0–5)

## 2020-06-01 PROCEDURE — 84443 ASSAY THYROID STIM HORMONE: CPT

## 2020-06-01 PROCEDURE — 3008F PR BODY MASS INDEX (BMI) DOCUMENTED: ICD-10-PCS | Mod: CPTII,S$GLB,, | Performed by: FAMILY MEDICINE

## 2020-06-01 PROCEDURE — 99214 PR OFFICE/OUTPT VISIT, EST, LEVL IV, 30-39 MIN: ICD-10-PCS | Mod: S$GLB,,, | Performed by: FAMILY MEDICINE

## 2020-06-01 PROCEDURE — 84439 ASSAY OF FREE THYROXINE: CPT

## 2020-06-01 PROCEDURE — 3008F BODY MASS INDEX DOCD: CPT | Mod: CPTII,S$GLB,, | Performed by: FAMILY MEDICINE

## 2020-06-01 PROCEDURE — 81001 URINALYSIS AUTO W/SCOPE: CPT

## 2020-06-01 PROCEDURE — 85027 COMPLETE CBC AUTOMATED: CPT

## 2020-06-01 PROCEDURE — 99999 PR PBB SHADOW E&M-EST. PATIENT-LVL III: CPT | Mod: PBBFAC,,, | Performed by: FAMILY MEDICINE

## 2020-06-01 PROCEDURE — 99999 PR PBB SHADOW E&M-EST. PATIENT-LVL III: ICD-10-PCS | Mod: PBBFAC,,, | Performed by: FAMILY MEDICINE

## 2020-06-01 PROCEDURE — 80061 LIPID PANEL: CPT

## 2020-06-01 PROCEDURE — 80053 COMPREHEN METABOLIC PANEL: CPT

## 2020-06-01 PROCEDURE — 36415 COLL VENOUS BLD VENIPUNCTURE: CPT | Mod: PN

## 2020-06-01 PROCEDURE — 97110 THERAPEUTIC EXERCISES: CPT | Mod: PN

## 2020-06-01 PROCEDURE — 99214 OFFICE O/P EST MOD 30 MIN: CPT | Mod: S$GLB,,, | Performed by: FAMILY MEDICINE

## 2020-06-01 RX ORDER — OXYCODONE HYDROCHLORIDE 5 MG/1
5 TABLET ORAL
Qty: 30 TABLET | Refills: 0 | Status: SHIPPED | OUTPATIENT
Start: 2020-06-01 | End: 2023-12-08

## 2020-06-01 RX ORDER — METHOCARBAMOL 750 MG/1
750 TABLET, FILM COATED ORAL DAILY PRN
Qty: 60 TABLET | Refills: 1 | Status: SHIPPED | OUTPATIENT
Start: 2020-06-01 | End: 2020-07-02 | Stop reason: SDUPTHER

## 2020-06-01 NOTE — PROGRESS NOTES
"  Physical Therapy Daily Treatment Note     Name: Janice Ramso  Clinic Number: [id number]    Therapy Diagnosis:   Encounter Diagnoses   Name Primary?    Decreased range of motion of lumbar spine     Decreased range of left hip movement     Decreased functional mobility     Impaired gait     Decreased strength of lower extremity      Physician: Emily Nassar MD    Visit Date: 6/1/2020    Physician Orders: PT Eval and Treat Begin PWB 25% x 2 weeks, then 50% x 2 weeks, then WBAT x 2 weeks; eval and treat 3x/wk x 6 wks- From 3/10 Referral   Medical Diagnosis from Referral: S32.811A (ICD-10-CM) - Multiple fractures of pelvis with unstable disruption of pelvic ring, initial encounter for closed fracture Z98.890,Z87.81 (ICD-10-CM) - S/P ORIF (open reduction internal fixation) fracture   Evaluation Date: 5/21/2020  Authorization Period Expiration: 03/10/2021  Plan of Care Expiration: 08/21/2020  Visit # / Visits authorized: 3/ 20 (1 prior)      Time In: 2:30  Time Out: 3:15  Total Billable Time: 45 minutes    Precautions: Standard, s/p pelvic ORIF, DVT LLE    Subjective     Pt reports: she was able to perform exercises in the pool over the weekend, and was able to sleep better. Pt states she drove herself to PT today. Pt reports she went to the MD today, and is having another ultrasound next week to check on the status of her DVTs.   She was compliant with home exercise program.  Response to previous treatment: increased soreness   Functional change: Increased walking     Pain: 2- 3/10  Location: left hip      Objective     Janice received therapeutic exercises to develop strength, endurance, ROM, flexibility, posture and core stabilization for 40 minutes including:  Supine DKTC with ball x 2 min   Glut Sets 20 x 5" hold   Plevic Tilt with TA set 20 x 5" hold   Hooklying Add w/ PT x 20   Supine March x 10   BKFO RTB  2 x 10    SLR x 10   Scap Retraction x 20  Sit to stand x 10   TKE RTB 2 x 10   Standing Hip " "Abd/Ext x 10 each LE  Step Ups 2" 2 x 10 LLE  Modified HSS Supine 3 x 20" each LE      Janice participated in gait training to improve functional mobility and safety for 5  minutes, including:  (NP) Weight shifts in // bars with mirror for visual feedback - lateral, diagonal x 1 min each   (NP) Gait Training in crosswalk with RW x 3 laps verbal cues for heel strike and terminal knee extension  Gait Training in crosswalk with SPC x 3 laps       Pt deferred ice to home    Home Exercises Provided and Patient Education Provided     Education provided:   - New HEP   - Gait mechanics, use of cane at home and walker in community   - Scar Massage     Written Home Exercises Provided: Patient instructed to cont prior HEP.  Exercises were reviewed and Janice was able to demonstrate them prior to the end of the session.  Janice demonstrated good  understanding of the education provided.     See EMR under Patient Instructions for exercises provided 5/25/2020.    Assessment     Pt demonstrated good tolerance for treatment session today with mild symptom provocation accompanied with muscular fatigue. Pt with evident L glut max weakness during standing hip extension exercise today. Pt with increased muscle juddering during eccentric portion of hip extension. Pt with improved stability and gait mechanics with SPC today, and is appropriate for household ambulation with cane.     Janice is progressing well towards her goals.   Pt prognosis is Good.     Pt will continue to benefit from skilled outpatient physical therapy to address the deficits listed in the problem list box on initial evaluation, provide pt/family education and to maximize pt's level of independence in the home and community environment.     Pt's spiritual, cultural and educational needs considered and pt agreeable to plan of care and goals.     Anticipated barriers to physical therapy: None     Goals:  Short Term Goals: 6 weeks (progressing, not met)  Pt will " report decreased hip and LE pain to 6/10 at worst to increase tolerance for ADLs.   Pt will ambulate 200 feet with minimal to no gait deviations and least restrictive assistive device   Pt will increase L hip flexion by 10 degrees to increase tolerance for bed mobility and transfers.   Pt to tolerate HEP to improve ROM and independence with ADL's     Long Term Goals: 12 weeks (progressing, not met)  Pt will report > / = 30% decrease in difficulty tolerating prolonged standing in order to improve ability to perform household chores.   Increased lumbar flexion and extension ROM to 70% for increased ease with daily activities  Pt will ambulate 500 feet with minimal to no gait deviations and least restrictive AD to increase tolerance for community ambulation   Pt will be independent in all bathroom ADLs to reduce caregiver burden and improve pt quality of life.   Pt will be independent in HEP and symptom management.       Plan     Continue with POC towards goals.     Paty Velez, PT
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
## 2020-06-02 DIAGNOSIS — D75.89 MACROCYTOSIS WITHOUT ANEMIA: Primary | ICD-10-CM

## 2020-06-02 DIAGNOSIS — R31.29 MICROSCOPIC HEMATURIA: Primary | ICD-10-CM

## 2020-06-04 ENCOUNTER — CLINICAL SUPPORT (OUTPATIENT)
Dept: REHABILITATION | Facility: HOSPITAL | Age: 55
End: 2020-06-04
Payer: COMMERCIAL

## 2020-06-04 DIAGNOSIS — M53.86 DECREASED RANGE OF MOTION OF LUMBAR SPINE: ICD-10-CM

## 2020-06-04 DIAGNOSIS — M25.652 DECREASED RANGE OF LEFT HIP MOVEMENT: ICD-10-CM

## 2020-06-04 DIAGNOSIS — R26.9 IMPAIRED GAIT: ICD-10-CM

## 2020-06-04 DIAGNOSIS — R26.89 DECREASED FUNCTIONAL MOBILITY: ICD-10-CM

## 2020-06-04 DIAGNOSIS — R29.898 DECREASED STRENGTH OF LOWER EXTREMITY: ICD-10-CM

## 2020-06-04 PROCEDURE — 97110 THERAPEUTIC EXERCISES: CPT | Mod: PN

## 2020-06-04 NOTE — PROGRESS NOTES
"  Physical Therapy Daily Treatment Note     Name: Janice Ramos  Clinic Number: [id number]    Therapy Diagnosis:   Encounter Diagnoses   Name Primary?    Decreased range of motion of lumbar spine     Decreased range of left hip movement     Decreased functional mobility     Impaired gait     Decreased strength of lower extremity      Physician: Emily Nassar MD    Visit Date: 6/4/2020    Physician Orders: PT Eval and Treat Begin PWB 25% x 2 weeks, then 50% x 2 weeks, then WBAT x 2 weeks; eval and treat 3x/wk x 6 wks- From 3/10 Referral   Medical Diagnosis from Referral: S32.811A (ICD-10-CM) - Multiple fractures of pelvis with unstable disruption of pelvic ring, initial encounter for closed fracture Z98.890,Z87.81 (ICD-10-CM) - S/P ORIF (open reduction internal fixation) fracture   Evaluation Date: 5/21/2020  Authorization Period Expiration: 03/10/2021  Plan of Care Expiration: 08/21/2020  Visit # / Visits authorized: 4/ 20 (1 prior)      Time In: 2:55 PM  Time Out: 3:40  PM  Total Billable Time: 45 minutes    Precautions: Standard, s/p pelvic ORIF, DVT LLE    Subjective     Pt reports: she was sore following prior treatment session. Pt states she was able to take a few steps without an AD this week, and was able to perform some weedeating in the garden.   She was compliant with home exercise program.  Response to previous treatment: increased soreness   Functional change: Increased walking     Pain: 2- 3/10  Location: left hip      Objective     Janice received therapeutic exercises to develop strength, endurance, ROM, flexibility, posture and core stabilization for 45 minutes including:  Supine DKTC with ball x 2 min   Glut Sets 20 x 5" hold   Plevic Tilt with TA set 20 x 5" hold   Hooklying Add w/ PT x 20   Supine March RTB x 10   BKFO RTB  2 x 10    SLR x 10 BLE  Scap Retraction x 20  Sit to stand x 10   (NP) TKE RTB 2 x 10   Shuttle 1.5 bands 2 x 10 BLE   Shuttle 1 band 2 x 10 Uni   Standing Hip " "Abd/Ext x 10 each LE  Stairs x 2 rounds with SPC, 1 round with no HR and SPC  Modified HSS Supine 3 x 20" each LE    Janice participated in gait training to improve functional mobility and safety for 0 minutes, including:  (NP) Weight shifts in // bars with mirror for visual feedback - lateral, diagonal x 1 min each   (NP) Gait Training in crosswalk with RW x 3 laps verbal cues for heel strike and terminal knee extension  Gait Training in crosswalk with SPC x 3 laps       Pt deferred ice to home    Home Exercises Provided and Patient Education Provided     Education provided:   - New HEP   - Gait mechanics, use of cane at home and walker in community   - Scar Massage     Written Home Exercises Provided: Patient instructed to cont prior HEP.  Exercises were reviewed and Janice was able to demonstrate them prior to the end of the session.  Janice demonstrated good  understanding of the education provided.     See EMR under Patient Instructions for exercises provided 5/25/2020.    Assessment     Pt demonstrated improved tolerance for treatment session today, able to progress closed chain lower extremity strengthening. Pt continues to demonstrate decreased in L hip AROM, along with glut weakness. Pt with demonstrated good form and safety with stair training today. Pt required min cues for appropriate sequencing, and was independent following 2nd trial. Will continue to progress as tolerated.     Janice is progressing well towards her goals.   Pt prognosis is Good.     Pt will continue to benefit from skilled outpatient physical therapy to address the deficits listed in the problem list box on initial evaluation, provide pt/family education and to maximize pt's level of independence in the home and community environment.     Pt's spiritual, cultural and educational needs considered and pt agreeable to plan of care and goals.     Anticipated barriers to physical therapy: None     Goals:  Short Term Goals: 6 weeks " (progressing, not met)  Pt will report decreased hip and LE pain to 6/10 at worst to increase tolerance for ADLs.   Pt will ambulate 200 feet with minimal to no gait deviations and least restrictive assistive device   Pt will increase L hip flexion by 10 degrees to increase tolerance for bed mobility and transfers.   Pt to tolerate HEP to improve ROM and independence with ADL's     Long Term Goals: 12 weeks (progressing, not met)  Pt will report > / = 30% decrease in difficulty tolerating prolonged standing in order to improve ability to perform household chores.   Increased lumbar flexion and extension ROM to 70% for increased ease with daily activities  Pt will ambulate 500 feet with minimal to no gait deviations and least restrictive AD to increase tolerance for community ambulation   Pt will be independent in all bathroom ADLs to reduce caregiver burden and improve pt quality of life.   Pt will be independent in HEP and symptom management.     Plan     Continue with POC towards goals.     Paty Velez, PT

## 2020-06-08 ENCOUNTER — CLINICAL SUPPORT (OUTPATIENT)
Dept: REHABILITATION | Facility: HOSPITAL | Age: 55
End: 2020-06-08
Payer: COMMERCIAL

## 2020-06-08 DIAGNOSIS — R29.898 DECREASED STRENGTH OF LOWER EXTREMITY: ICD-10-CM

## 2020-06-08 DIAGNOSIS — R26.9 IMPAIRED GAIT: ICD-10-CM

## 2020-06-08 DIAGNOSIS — M25.652 DECREASED RANGE OF LEFT HIP MOVEMENT: ICD-10-CM

## 2020-06-08 DIAGNOSIS — R26.89 DECREASED FUNCTIONAL MOBILITY: ICD-10-CM

## 2020-06-08 DIAGNOSIS — M53.86 DECREASED RANGE OF MOTION OF LUMBAR SPINE: ICD-10-CM

## 2020-06-08 PROCEDURE — 97110 THERAPEUTIC EXERCISES: CPT | Mod: PN

## 2020-06-08 NOTE — PROGRESS NOTES
"  Physical Therapy Daily Treatment Note     Name: Janice Ramos  Clinic Number: [id number]    Therapy Diagnosis:   Encounter Diagnoses   Name Primary?    Decreased range of motion of lumbar spine     Decreased range of left hip movement     Decreased functional mobility     Impaired gait     Decreased strength of lower extremity      Physician: Emily Nassar MD    Visit Date: 6/8/2020    Physician Orders: PT Eval and Treat Begin PWB 25% x 2 weeks, then 50% x 2 weeks, then WBAT x 2 weeks; eval and treat 3x/wk x 6 wks- From 3/10 Referral   Medical Diagnosis from Referral: S32.811A (ICD-10-CM) - Multiple fractures of pelvis with unstable disruption of pelvic ring, initial encounter for closed fracture Z98.890,Z87.81 (ICD-10-CM) - S/P ORIF (open reduction internal fixation) fracture   Evaluation Date: 5/21/2020  Authorization Period Expiration: 03/10/2021  Plan of Care Expiration: 08/21/2020  Visit # / Visits authorized: 5/ 20 (1 prior)      Time In: 2:30 PM  Time Out: 3:40  PM  Total Billable Time: 45 minutes    Precautions: Standard, s/p pelvic ORIF, DVT LLE    Subjective     Pt reports: she is a little achy today, and felt that way yesterday too. Pt states she did not have much soreness following last visit, fatigue.   She was compliant with home exercise program.  Response to previous treatment: increased soreness   Functional change: Increased walking     Pain: 3-4/10  Location: left hip      Objective     Janice received therapeutic exercises to develop strength, endurance, ROM, flexibility, posture and core stabilization for 45 minutes including:  Supine DKTC with ball x 2 min   Modified HSS Supine 3 x 20" each LE  Hooklying Add w/ PT x 20   Supine March RTB x 10   BKFO RTB  2 x 10    Bridge RTB x 15  SLR x 10 BLE  Brueggers RTB x 10  Mini Squats x 10   Standing Hip Abd/Ext x 10 each LE  Shuttle 1.5 bands 2 x 10 BLE   Shuttle 1 band 2 x 10 Uni   (NP) Stairs x 2 rounds with SPC, 1 round with no HR " "and SPC    Not Performed - HEP  Glut Sets 20 x 5" hold   Sit to stand x 10   Plevic Tilt with TA set 20 x 5" hold   Scap Retraction x 20  (NP) TKE RTB 2 x 10   Janice participated in gait training to improve functional mobility and safety for 0 minutes, including:  (NP) Weight shifts in // bars with mirror for visual feedback - lateral, diagonal x 1 min each   (NP) Gait Training in crosswalk with RW x 3 laps verbal cues for heel strike and terminal knee extension  Gait Training in crosswalk with SPC x 3 laps       Pt deferred ice to home    Home Exercises Provided and Patient Education Provided     Education provided:   - New HEP   - Gait mechanics, use of cane at home and walker in community   - Scar Massage     Written Home Exercises Provided: Patient instructed to cont prior HEP.  Exercises were reviewed and Janice was able to demonstrate them prior to the end of the session.  Janice demonstrated good  understanding of the education provided.     See EMR under Patient Instructions for exercises provided 5/25/2020.    Assessment     Pt able to progress treatment session today to include progressive glut and core strengthening. Pt demonstrated slight difficulty with recumbent bike due to limitations in hip flexion; however, symptoms improved with continued cycling and seat adjustments. Pt required min cue during mini squats for appropriate form. Pt with improved endurance with therex today and reduced muscle juddering.     Janice is progressing well towards her goals.   Pt prognosis is Good.     Pt will continue to benefit from skilled outpatient physical therapy to address the deficits listed in the problem list box on initial evaluation, provide pt/family education and to maximize pt's level of independence in the home and community environment.     Pt's spiritual, cultural and educational needs considered and pt agreeable to plan of care and goals.     Anticipated barriers to physical therapy: None "     Goals:  Short Term Goals: 6 weeks (progressing, not met)  Pt will report decreased hip and LE pain to 6/10 at worst to increase tolerance for ADLs.   Pt will ambulate 200 feet with minimal to no gait deviations and least restrictive assistive device   Pt will increase L hip flexion by 10 degrees to increase tolerance for bed mobility and transfers.   Pt to tolerate HEP to improve ROM and independence with ADL's     Long Term Goals: 12 weeks (progressing, not met)  Pt will report > / = 30% decrease in difficulty tolerating prolonged standing in order to improve ability to perform household chores.   Increased lumbar flexion and extension ROM to 70% for increased ease with daily activities  Pt will ambulate 500 feet with minimal to no gait deviations and least restrictive AD to increase tolerance for community ambulation   Pt will be independent in all bathroom ADLs to reduce caregiver burden and improve pt quality of life.   Pt will be independent in HEP and symptom management.     Plan     Continue with POC towards goals.     Paty Velez, PT

## 2020-06-11 ENCOUNTER — CLINICAL SUPPORT (OUTPATIENT)
Dept: REHABILITATION | Facility: HOSPITAL | Age: 55
End: 2020-06-11
Payer: COMMERCIAL

## 2020-06-11 DIAGNOSIS — M53.86 DECREASED RANGE OF MOTION OF LUMBAR SPINE: ICD-10-CM

## 2020-06-11 DIAGNOSIS — R26.89 DECREASED FUNCTIONAL MOBILITY: ICD-10-CM

## 2020-06-11 DIAGNOSIS — R29.898 DECREASED STRENGTH OF LOWER EXTREMITY: ICD-10-CM

## 2020-06-11 DIAGNOSIS — M25.652 DECREASED RANGE OF LEFT HIP MOVEMENT: ICD-10-CM

## 2020-06-11 DIAGNOSIS — R26.9 IMPAIRED GAIT: ICD-10-CM

## 2020-06-11 PROCEDURE — 97112 NEUROMUSCULAR REEDUCATION: CPT | Mod: PN

## 2020-06-11 PROCEDURE — 97110 THERAPEUTIC EXERCISES: CPT | Mod: PN

## 2020-06-11 NOTE — PROGRESS NOTES
"  Physical Therapy Daily Treatment Note     Name: Janice Ramos  Clinic Number: [id number]    Therapy Diagnosis:   Encounter Diagnoses   Name Primary?    Decreased range of motion of lumbar spine     Decreased range of left hip movement     Decreased functional mobility     Impaired gait     Decreased strength of lower extremity      Physician: Emily Nassar MD    Visit Date: 6/11/2020    Physician Orders: PT Eval and Treat Begin PWB 25% x 2 weeks, then 50% x 2 weeks, then WBAT x 2 weeks; eval and treat 3x/wk x 6 wks- From 3/10 Referral   Medical Diagnosis from Referral: S32.811A (ICD-10-CM) - Multiple fractures of pelvis with unstable disruption of pelvic ring, initial encounter for closed fracture Z98.890,Z87.81 (ICD-10-CM) - S/P ORIF (open reduction internal fixation) fracture   Evaluation Date: 5/21/2020  Authorization Period Expiration: 03/10/2021  Plan of Care Expiration: 08/21/2020  Visit # / Visits authorized: 6/ 20 (1 prior)      Time In: 2:50 PM  Time Out: 3:40  PM  Total Billable Time: 50 minutes    Precautions: Standard, s/p pelvic ORIF, DVT LLE    Subjective     Pt reports: she is doing okay today, did her PT exercises yesterday and is a little sore.   She was compliant with home exercise program.  Response to previous treatment: increased soreness   Functional change: Increased walking     Pain: 3/10  Location: left hip      Objective     Janice received therapeutic exercises to develop strength, endurance, ROM, flexibility, posture and core stabilization for 40 minutes including:  Supine DKTC with ball x 2 min   Recumbent Bike Seat 5 Level 1 x 5 min   Shuttle 2 bands 2 x 10 BLE   Shuttle 1 band 2 x 10 Uni   Mini Squats x 10   Standing Hip Abd/Ext x 15 each LE  Steps Ups 6" step 2 x 10   Lateral Walking in // bars YTB x 4 laps   Modified HSS Supine 3 x 20" each LE    Not Performed HEP:   Hooklying Add w/ PT x 20   Supine March RTB x 10   BKFO RTB  2 x 10    Bridge RTB x 15  SLR x 10 " BLE  [facility] RTB x 10    Patient participated in neuromuscular re-education activities to improve: Balance, Coordination, Kinesthetic, Sense, Proprioception and Posture for 10 minutes. The following activities were included:   So Step Overs in // Bars Fwd and Lateral x 4 laps each with varying UE support       Pt deferred ice to home    Home Exercises Provided and Patient Education Provided     Education provided:   - New HEP   - Gait mechanics, use of cane at home and walker in community   - Scar Massage     Written Home Exercises Provided: Patient instructed to cont prior HEP.  Exercises were reviewed and Janice was able to demonstrate them prior to the end of the session.  Janice demonstrated good  understanding of the education provided.     See EMR under Patient Instructions for exercises provided 5/25/2020.    Assessment     Pt able to progress treatment session today to include progressive LE kinetic chain strengthening, as well as introduction to balance challenges. Pt with improved tolerance for recumbent bike with increased hip flexion angle. Pt continues to demonstrate considerable L glut atrophy and weakness during standing hip extension exercise, although displays good muscle activation. Pt able to perform 2 laps in // bars with hurdles without UE support. Pt progressing very well, will continue to progress as tolerated.     Janice is progressing well towards her goals.   Pt prognosis is Good.     Pt will continue to benefit from skilled outpatient physical therapy to address the deficits listed in the problem list box on initial evaluation, provide pt/family education and to maximize pt's level of independence in the home and community environment.     Pt's spiritual, cultural and educational needs considered and pt agreeable to plan of care and goals.     Anticipated barriers to physical therapy: None     Goals:  Short Term Goals: 6 weeks (progressing, not met)  Pt will report decreased hip  and LE pain to 6/10 at worst to increase tolerance for ADLs.   Pt will ambulate 200 feet with minimal to no gait deviations and least restrictive assistive device   Pt will increase L hip flexion by 10 degrees to increase tolerance for bed mobility and transfers.   Pt to tolerate HEP to improve ROM and independence with ADL's     Long Term Goals: 12 weeks (progressing, not met)  Pt will report > / = 30% decrease in difficulty tolerating prolonged standing in order to improve ability to perform household chores.   Increased lumbar flexion and extension ROM to 70% for increased ease with daily activities  Pt will ambulate 500 feet with minimal to no gait deviations and least restrictive AD to increase tolerance for community ambulation   Pt will be independent in all bathroom ADLs to reduce caregiver burden and improve pt quality of life.   Pt will be independent in HEP and symptom management.     Plan     Continue with POC towards goals.     Paty Velez, PT

## 2020-06-15 ENCOUNTER — CLINICAL SUPPORT (OUTPATIENT)
Dept: REHABILITATION | Facility: HOSPITAL | Age: 55
End: 2020-06-15
Payer: COMMERCIAL

## 2020-06-15 DIAGNOSIS — R26.9 IMPAIRED GAIT: ICD-10-CM

## 2020-06-15 DIAGNOSIS — R26.89 DECREASED FUNCTIONAL MOBILITY: ICD-10-CM

## 2020-06-15 DIAGNOSIS — M25.652 DECREASED RANGE OF LEFT HIP MOVEMENT: ICD-10-CM

## 2020-06-15 DIAGNOSIS — R29.898 DECREASED STRENGTH OF LOWER EXTREMITY: ICD-10-CM

## 2020-06-15 DIAGNOSIS — M53.86 DECREASED RANGE OF MOTION OF LUMBAR SPINE: ICD-10-CM

## 2020-06-15 PROCEDURE — 97110 THERAPEUTIC EXERCISES: CPT | Mod: PN

## 2020-06-15 NOTE — PROGRESS NOTES
"  Physical Therapy Daily Treatment Note     Name: Janice Ramos  Clinic Number: [id number]    Therapy Diagnosis:   Encounter Diagnoses   Name Primary?    Decreased range of motion of lumbar spine     Decreased range of left hip movement     Decreased functional mobility     Impaired gait     Decreased strength of lower extremity      Physician: Emily Nassar MD    Visit Date: 6/15/2020    Physician Orders: PT Eval and Treat Begin PWB 25% x 2 weeks, then 50% x 2 weeks, then WBAT x 2 weeks; eval and treat 3x/wk x 6 wks- From 3/10 Referral   Medical Diagnosis from Referral: S32.811A (ICD-10-CM) - Multiple fractures of pelvis with unstable disruption of pelvic ring, initial encounter for closed fracture Z98.890,Z87.81 (ICD-10-CM) - S/P ORIF (open reduction internal fixation) fracture   Evaluation Date: 5/21/2020  Authorization Period Expiration: 03/10/2021  Plan of Care Expiration: 08/21/2020  Visit # / Visits authorized: 7/ 20 (1 prior)      Time In: 2:35 PM  Time Out: 3:20 PM  Total Billable Time: 45 minutes    Precautions: Standard, s/p pelvic ORIF, DVT LLE    Subjective     Pt reports: she is doing good today, was able to swim laps in the pool this weekend. Pt states her ankle is really bothering her today, feeling more swollen.   She was compliant with home exercise program.  Response to previous treatment: increased soreness   Functional change: able to swim     Pain: 3/10  Location: left hip      Objective     Janice received therapeutic exercises to develop strength, endurance, ROM, flexibility, posture and core stabilization for 45 minutes including:  Supine DKTC with ball x 2 min   Bridge x 10   Recumbent Bike Seat 5 Level 3 x 5 min   Shuttle 2 bands 2 x 10 BLE   Shuttle 1 band 2 x 10 Uni   Pilates step L2 x 10 each LE  Mini Squats x 10   (not performed - today only) Standing Hip Abd/Ext x 15 each LE  (not performed - today only) Steps Ups 6" step 2 x 10   (not performed - today only) Lateral " Completed form was missing ICD codes.  Completed and faxed, awaiting confirmation.   "Walking in // bars YTB x 4 laps   Modified HSS Supine 3 x 20" each LE    Not Performed HEP:   Hooklying Add w/ PT x 20   Supine March RTB x 10   BKFO RTB  2 x 10    Bridge RTB x 15  SLR x 10 BLE  Brueggers RTB x 10    Patient participated in neuromuscular re-education activities to improve: Balance, Coordination, Kinesthetic, Sense, Proprioception and Posture for 0 minutes. The following activities were included:   So Step Overs in // Bars Fwd and Lateral x 4 laps each with varying UE support       Pt deferred ice to home    Home Exercises Provided and Patient Education Provided     Education provided:   - New HEP   - Gait mechanics, use of cane at home and walker in community   - Scar Massage     Written Home Exercises Provided: Patient instructed to cont prior HEP.  Exercises were reviewed and Janice was able to demonstrate them prior to the end of the session.  Janice demonstrated good  understanding of the education provided.     See EMR under Patient Instructions for exercises provided 5/25/2020.    Assessment     Pt treatment session modified slightly today following pilates step due to increased swelling and erythema to LLE with prolonged weight bearing. Pt with improved coloring of LE upon removing from dependent position. Pt encouraged to apply compression socks and elevate LEs when arriving home. Pt verbalized understanding.     Janice is progressing well towards her goals.   Pt prognosis is Good.     Pt will continue to benefit from skilled outpatient physical therapy to address the deficits listed in the problem list box on initial evaluation, provide pt/family education and to maximize pt's level of independence in the home and community environment.     Pt's spiritual, cultural and educational needs considered and pt agreeable to plan of care and goals.     Anticipated barriers to physical therapy: None     Goals:  Short Term Goals: 6 weeks (progressing, not met)  Pt will report decreased hip " and LE pain to 6/10 at worst to increase tolerance for ADLs.   Pt will ambulate 200 feet with minimal to no gait deviations and least restrictive assistive device   Pt will increase L hip flexion by 10 degrees to increase tolerance for bed mobility and transfers.   Pt to tolerate HEP to improve ROM and independence with ADL's     Long Term Goals: 12 weeks (progressing, not met)  Pt will report > / = 30% decrease in difficulty tolerating prolonged standing in order to improve ability to perform household chores.   Increased lumbar flexion and extension ROM to 70% for increased ease with daily activities  Pt will ambulate 500 feet with minimal to no gait deviations and least restrictive AD to increase tolerance for community ambulation   Pt will be independent in all bathroom ADLs to reduce caregiver burden and improve pt quality of life.   Pt will be independent in HEP and symptom management.     Plan     Continue with POC towards goals.     Paty Velez, PT

## 2020-06-18 ENCOUNTER — CLINICAL SUPPORT (OUTPATIENT)
Dept: REHABILITATION | Facility: HOSPITAL | Age: 55
End: 2020-06-18
Payer: COMMERCIAL

## 2020-06-18 DIAGNOSIS — R26.9 IMPAIRED GAIT: ICD-10-CM

## 2020-06-18 DIAGNOSIS — R26.89 DECREASED FUNCTIONAL MOBILITY: ICD-10-CM

## 2020-06-18 DIAGNOSIS — M25.652 DECREASED RANGE OF LEFT HIP MOVEMENT: ICD-10-CM

## 2020-06-18 DIAGNOSIS — R29.898 DECREASED STRENGTH OF LOWER EXTREMITY: ICD-10-CM

## 2020-06-18 DIAGNOSIS — M53.86 DECREASED RANGE OF MOTION OF LUMBAR SPINE: ICD-10-CM

## 2020-06-18 PROCEDURE — 97110 THERAPEUTIC EXERCISES: CPT | Mod: PN

## 2020-06-18 NOTE — PROGRESS NOTES
Physical Therapy Daily Treatment Note     Name: Janice Ramos  Clinic Number: [id number]    Therapy Diagnosis:   Encounter Diagnoses   Name Primary?    Decreased range of motion of lumbar spine     Decreased range of left hip movement     Decreased functional mobility     Impaired gait     Decreased strength of lower extremity      Physician: Emily Nassar MD    Visit Date: 6/18/2020    Physician Orders: PT Eval and Treat Begin PWB 25% x 2 weeks, then 50% x 2 weeks, then WBAT x 2 weeks; eval and treat 3x/wk x 6 wks- From 3/10 Referral   Medical Diagnosis from Referral: S32.811A (ICD-10-CM) - Multiple fractures of pelvis with unstable disruption of pelvic ring, initial encounter for closed fracture Z98.890,Z87.81 (ICD-10-CM) - S/P ORIF (open reduction internal fixation) fracture   Evaluation Date: 5/21/2020  Authorization Period Expiration: 03/10/2021  Plan of Care Expiration: 08/21/2020  Visit # / Visits authorized: 8/ 20 (1 prior)      Time In: 2:35 PM  Time Out: 3:20 PM  Total Billable Time: 45 minutes    Precautions: Standard, s/p pelvic ORIF, DVT LLE    Subjective     Pt reports: she is doing good today, was able to swim laps in the pool this weekend. Pt states her ankle is really bothering her today, feeling more swollen.   She was compliant with home exercise program.  Response to previous treatment: increased soreness   Functional change: able to swim     Pain: 3/10  Location: left hip      Objective     Posture Alignment : In standing, pt with significant forward head, rounded shoulders, forward trunk flexion      Movement Analysis: Pt demonstrates increased trunk flexion with hip extension. Pt requires additional time and effort to complete bed mobility.         GAIT DEVIATIONS: Janice displays decreased jessi. Pt ambulates with SPC.      ROM:     AROM ROM (% of normal) Comment Normal   Flexion: 100% Pain end range 60 deg   Extension: 25% Pain end range 25 deg   Lateral Flex R: 25% Pain  "end range,  25 deg   Lateral Flex L: 25% Pain end range,  25 deg   Rotation R: 10%   18 deg   Rotation L: 10%   18 deg   *denotes pain        PROM Right Left Comment   Hip Flexion: 110 degrees 100 degrees     Hip ABD: WNL degrees WNL degrees     Hip ER, 90°  flex: 46 degrees 40 degrees     Hip IR, 90°  flex: 26 degrees 32 degrees     *pain     Strength:          Right Left Comment   Hip Flexion: 4+/5 4/5     Knee Extension: 5/5 4+/5     Knee Flexion: 5/5 5/5     Ankle DF: 5/5 5/5        Hip MMT unable to be assessed secondary to pt intolerance for position     Special Tests:     Girth:   LLE   Malleolus: 23 cm   Mid calf 31.8 cm   Mid thigh 51.5 cm      RLE   Malleolus: 21 cm   Mid calf 33 cm   Mid thigh 53 cm      Neurovascular:  - Sensation: Grossly intact to light touch across extremities, tightness and discomfort in LLE compared to right  - Balance: Pt able to perform SLS on LLE x 20 sec with min UE support      Palpation:  Pt with atrophy of L glut. Continued edema to L ankle.      Joint Play:  Not assessed      Crystal received therapeutic exercises to develop strength, endurance, ROM, flexibility, posture and core stabilization for 45 minutes including:  Lateral Walking in // bars Y Loop x 4 laps   Standing Hip Abd/Ext Y Loop x 10 each   Lateral Step Ups 4" step x 10 each LE   Standing Marches on foam 2 x 30"   Shuttle 2 bands 2 x 10 BLE   Shuttle 1 band 2 x 10 Uni   Pilates step L2 x 10 each LE  Mini Squats x 10   Recumbent Bike Seat 5 Level 3 x 5 min     Not Performed HEP:   Supine DKTC with ball x 2 min   (not performed - today only) Steps Ups 6" step 2 x 10   Modified HSS Supine 3 x 20" each LE  Hooklying Add w/ PT x 20   Supine March RTB x 10   BKFO RTB  2 x 10    Bridge RTB x 15  SLR x 10 BLE  Brueggers RTB x 10    Patient participated in neuromuscular re-education activities to improve: Balance, Coordination, Kinesthetic, Sense, Proprioception and Posture for 0 minutes. The following activities were " included:   So Step Overs in // Bars Fwd and Lateral x 4 laps each with varying UE support       Pt deferred ice to home    Home Exercises Provided and Patient Education Provided     Education provided:   - New HEP   - Gait mechanics, use of cane at home and walker in community   - Scar Massage     Written Home Exercises Provided: Patient instructed to cont prior HEP.  Exercises were reviewed and Janice was able to demonstrate them prior to the end of the session.  Janice demonstrated good  understanding of the education provided.     See EMR under Patient Instructions for exercises provided 5/25/2020.    Assessment     Pt demonstrated improvements in lumbar AROM, hip AROM, and LE strength since beginning PT services. Pt additionally with improvements in gait, functional mobility, and balance. Pt continues with LE edema secondary to DVT in LLE. Pt additionally with continued L glut atrophy, which may contribute to residual gait deficits and mobility impairments. Pt able to progress closed chain strengthening today to include increased resistance with glut strengthening. Pt very motivated and compliant with HEP. Will continue to progress as tolerated.     Janice is progressing well towards her goals.   Pt prognosis is Good.     Pt will continue to benefit from skilled outpatient physical therapy to address the deficits listed in the problem list box on initial evaluation, provide pt/family education and to maximize pt's level of independence in the home and community environment.     Pt's spiritual, cultural and educational needs considered and pt agreeable to plan of care and goals.     Anticipated barriers to physical therapy: None     Goals:  Short Term Goals: 6 weeks (progressing, not met)  Pt will report decreased hip and LE pain to 6/10 at worst to increase tolerance for ADLs.   Pt will ambulate 200 feet with minimal to no gait deviations and least restrictive assistive device MET  Pt will increase L hip  flexion by 10 degrees to increase tolerance for bed mobility and transfers. MET  Pt to tolerate HEP to improve ROM and independence with ADL's MET     Long Term Goals: 12 weeks (progressing, not met)  Pt will report > / = 30% decrease in difficulty tolerating prolonged standing in order to improve ability to perform household chores.   Increased lumbar flexion and extension ROM to 70% for increased ease with daily activities  Pt will ambulate 500 feet with minimal to no gait deviations and least restrictive AD to increase tolerance for community ambulation   Pt will be independent in all bathroom ADLs to reduce caregiver burden and improve pt quality of life.   Pt will be independent in HEP and symptom management.     Plan     Continue with POC towards goals.     Paty Velez, PT

## 2020-06-22 ENCOUNTER — CLINICAL SUPPORT (OUTPATIENT)
Dept: REHABILITATION | Facility: HOSPITAL | Age: 55
End: 2020-06-22
Payer: COMMERCIAL

## 2020-06-22 DIAGNOSIS — M53.86 DECREASED RANGE OF MOTION OF LUMBAR SPINE: ICD-10-CM

## 2020-06-22 DIAGNOSIS — R26.89 DECREASED FUNCTIONAL MOBILITY: ICD-10-CM

## 2020-06-22 DIAGNOSIS — R26.9 IMPAIRED GAIT: ICD-10-CM

## 2020-06-22 DIAGNOSIS — M25.652 DECREASED RANGE OF LEFT HIP MOVEMENT: ICD-10-CM

## 2020-06-22 DIAGNOSIS — R29.898 DECREASED STRENGTH OF LOWER EXTREMITY: ICD-10-CM

## 2020-06-22 PROCEDURE — 97110 THERAPEUTIC EXERCISES: CPT | Mod: PN

## 2020-06-22 NOTE — PROGRESS NOTES
"  Physical Therapy Daily Treatment Note     Name: Janice Ramos  Clinic Number: [id number]    Therapy Diagnosis:   Encounter Diagnoses   Name Primary?    Decreased range of motion of lumbar spine     Decreased range of left hip movement     Decreased functional mobility     Impaired gait     Decreased strength of lower extremity      Physician: Emily Nassar MD    Visit Date: 6/22/2020    Physician Orders: PT Eval and Treat Begin PWB 25% x 2 weeks, then 50% x 2 weeks, then WBAT x 2 weeks; eval and treat 3x/wk x 6 wks- From 3/10 Referral   Medical Diagnosis from Referral: S32.811A (ICD-10-CM) - Multiple fractures of pelvis with unstable disruption of pelvic ring, initial encounter for closed fracture Z98.890,Z87.81 (ICD-10-CM) - S/P ORIF (open reduction internal fixation) fracture   Evaluation Date: 5/21/2020  Authorization Period Expiration: 03/10/2021  Plan of Care Expiration: 08/21/2020  Visit # / Visits authorized: 9/ 20 (1 prior)      Time In: 2:32 PM  Time Out: 3:30 PM  Total Billable Time: 45 minutes    Precautions: Standard, s/p pelvic ORIF, DVT LLE    Subjective     Pt reports: her ankle is still swollen. Pt states she was able to swim this weekend without difficulty.   She was compliant with home exercise program.  Response to previous treatment: increased soreness   Functional change: able to swim     Pain: 3/10  Location: left hip      Objective       Janice received therapeutic exercises to develop strength, endurance, ROM, flexibility, posture and core stabilization for 58 minutes including:  Lateral Walking in // bars Y Loop x 4 laps   Standing Hip Abd/Ext Y Loop x 10 each   Lateral Step Ups 4" step x 10 each LE   Standing Marches on foam 2 x 30"  Seated Hip IR/ER YTB x 10 each LE  BKFO GTB 2 x 10    Bridge GTB x 20  Modified HSS Supine 3 x 20" each LE  Shuttle 2.5 bands 2 x 10 BLE YTB at knees  Shuttle 1.5 band 2 x 10 Uni  YTB at knees  Pilates step L2 2 x 10 each LE  Mini Squats x 10 " "  Recumbent Bike Seat 5 Level 3 x 5 min     Not Performed HEP:   Supine DKTC with ball x 2 min   (not performed - today only) Steps Ups 6" step 2 x 10   Hooklying Add w/ PT x 20   Supine March RTB x 10   SLR x 10 BLE  Brueggers RTB x 10    Patient participated in neuromuscular re-education activities to improve: Balance, Coordination, Kinesthetic, Sense, Proprioception and Posture for 0 minutes. The following activities were included:   So Step Overs in // Bars Fwd and Lateral x 4 laps each with varying UE support     Pt deferred ice to home    Home Exercises Provided and Patient Education Provided     Education provided:   - New HEP   - Gait mechanics, use of cane at home and walker in community   - Scar Massage     Written Home Exercises Provided: Patient instructed to cont prior HEP.  Exercises were reviewed and Janice was able to demonstrate them prior to the end of the session.  Janice demonstrated good  understanding of the education provided.     See EMR under Patient Instructions for exercises provided 5/25/2020.    Assessment     Pt displayed good tolerance for treatment session today, able to ambulate throughout gym without SPC and good stability. Pt with impaired gait mechanics, mostly due to L glut weakness and pain. Pt able to progress open and closed chain hip strengthening today without provocation. Pt safe to ambulate throughout the house without AD, barring no increase in pain. Pt to continue using SPC in the community.     Janice is progressing well towards her goals.   Pt prognosis is Good.     Pt will continue to benefit from skilled outpatient physical therapy to address the deficits listed in the problem list box on initial evaluation, provide pt/family education and to maximize pt's level of independence in the home and community environment.     Pt's spiritual, cultural and educational needs considered and pt agreeable to plan of care and goals.     Anticipated barriers to physical " therapy: None     Goals:  Short Term Goals: 6 weeks (progressing, not met)  Pt will report decreased hip and LE pain to 6/10 at worst to increase tolerance for ADLs.   Pt will ambulate 200 feet with minimal to no gait deviations and least restrictive assistive device MET  Pt will increase L hip flexion by 10 degrees to increase tolerance for bed mobility and transfers. MET  Pt to tolerate HEP to improve ROM and independence with ADL's MET     Long Term Goals: 12 weeks (progressing, not met)  Pt will report > / = 30% decrease in difficulty tolerating prolonged standing in order to improve ability to perform household chores.   Increased lumbar flexion and extension ROM to 70% for increased ease with daily activities  Pt will ambulate 500 feet with minimal to no gait deviations and least restrictive AD to increase tolerance for community ambulation   Pt will be independent in all bathroom ADLs to reduce caregiver burden and improve pt quality of life.   Pt will be independent in HEP and symptom management.     Plan     Continue with POC towards goals.     Paty Velez, PT

## 2020-06-25 ENCOUNTER — CLINICAL SUPPORT (OUTPATIENT)
Dept: REHABILITATION | Facility: HOSPITAL | Age: 55
End: 2020-06-25
Payer: COMMERCIAL

## 2020-06-25 DIAGNOSIS — R26.89 DECREASED FUNCTIONAL MOBILITY: ICD-10-CM

## 2020-06-25 DIAGNOSIS — M53.86 DECREASED RANGE OF MOTION OF LUMBAR SPINE: ICD-10-CM

## 2020-06-25 DIAGNOSIS — R26.9 IMPAIRED GAIT: ICD-10-CM

## 2020-06-25 DIAGNOSIS — R29.898 DECREASED STRENGTH OF LOWER EXTREMITY: ICD-10-CM

## 2020-06-25 DIAGNOSIS — M25.652 DECREASED RANGE OF LEFT HIP MOVEMENT: ICD-10-CM

## 2020-06-25 PROCEDURE — 97110 THERAPEUTIC EXERCISES: CPT | Mod: PN

## 2020-06-25 NOTE — PROGRESS NOTES
"  Physical Therapy Daily Treatment Note     Name: Janice Ramos  Clinic Number: [id number]    Therapy Diagnosis:   Encounter Diagnoses   Name Primary?    Decreased range of motion of lumbar spine     Decreased range of left hip movement     Decreased functional mobility     Impaired gait     Decreased strength of lower extremity      Physician: Emily Nassar MD    Visit Date: 6/25/2020    Physician Orders: PT Eval and Treat Begin PWB 25% x 2 weeks, then 50% x 2 weeks, then WBAT x 2 weeks; eval and treat 3x/wk x 6 wks- From 3/10 Referral   Medical Diagnosis from Referral: S32.811A (ICD-10-CM) - Multiple fractures of pelvis with unstable disruption of pelvic ring, initial encounter for closed fracture Z98.890,Z87.81 (ICD-10-CM) - S/P ORIF (open reduction internal fixation) fracture   Evaluation Date: 5/21/2020  Authorization Period Expiration: 03/10/2021  Plan of Care Expiration: 08/21/2020  Visit # / Visits authorized: 9/ 20 (1 prior)      Time In: 2:45 PM  Time Out: 3:35 PM  Total Billable Time: 50 minutes    Precautions: Standard, s/p pelvic ORIF, DVT LLE    Subjective     Pt reports: she is feeling very stiff today because she did not sleep well last night.   She was compliant with home exercise program.  Response to previous treatment: increased soreness   Functional change: able to swim     Pain: 4/10  Location: left hip      Objective     Janice received therapeutic exercises to develop strength, endurance, ROM, flexibility, posture and core stabilization for 50 minutes including:  Supine DKTC with ball x 2 min   BKFO GTB 2 x 10    Bridge GTB x 20  Seated Hip IR/ER YTB x 10 each LE  Supine piriformis stretch 3 x 20" LLE  Lateral Walking in // bars Y Loop x 4 laps   Standing Hip Abd/Ext Y Loop x 10 each   Lateral Step Ups 4" step x 10 each LE   Standing Marches on foam 2 x 30"  Pilates step L2 2 x 10 each LE  Recumbent Bike Seat 5 Level 3 x 5 min     (NP) Modified HSS Supine 3 x 20" each LE  (NP) " Shuttle 2.5 bands 2 x 10 BLE YTB at knees  (NP) Shuttle 1.5 band 2 x 10 Uni  YTB at knees  (NP) Mini Squats x 10       Patient participated in neuromuscular re-education activities to improve: Balance, Coordination, Kinesthetic, Sense, Proprioception and Posture for 0 minutes. The following activities were included:   So Step Overs in // Bars Fwd and Lateral x 4 laps each with varying UE support     Pt deferred ice to home    Home Exercises Provided and Patient Education Provided     Education provided:   - New HEP   - Gait mechanics, use of cane at home and walker in community   - Scar Massage     Written Home Exercises Provided: Patient instructed to cont prior HEP.  Exercises were reviewed and Janice was able to demonstrate them prior to the end of the session.  Janice demonstrated good  understanding of the education provided.     See EMR under Patient Instructions for exercises provided 5/25/2020.    Assessment     Pt demonstrated decreased functional mobility today, requiring additional time and effort to perform bed mobility. Shuttle omitted from treatment session today due to increased back pain and difficulty level of transfer to equipment. Pt continues to demonstrate atrophy of L glut, but improve muscle activation with closed chain activity. Will attempt to progress as tolerated.     Janice is progressing well towards her goals.   Pt prognosis is Good.     Pt will continue to benefit from skilled outpatient physical therapy to address the deficits listed in the problem list box on initial evaluation, provide pt/family education and to maximize pt's level of independence in the home and community environment.     Pt's spiritual, cultural and educational needs considered and pt agreeable to plan of care and goals.     Anticipated barriers to physical therapy: None     Goals:  Short Term Goals: 6 weeks (progressing, not met)  Pt will report decreased hip and LE pain to 6/10 at worst to increase  tolerance for ADLs.   Pt will ambulate 200 feet with minimal to no gait deviations and least restrictive assistive device MET  Pt will increase L hip flexion by 10 degrees to increase tolerance for bed mobility and transfers. MET  Pt to tolerate HEP to improve ROM and independence with ADL's MET     Long Term Goals: 12 weeks (progressing, not met)  Pt will report > / = 30% decrease in difficulty tolerating prolonged standing in order to improve ability to perform household chores.   Increased lumbar flexion and extension ROM to 70% for increased ease with daily activities  Pt will ambulate 500 feet with minimal to no gait deviations and least restrictive AD to increase tolerance for community ambulation   Pt will be independent in all bathroom ADLs to reduce caregiver burden and improve pt quality of life.   Pt will be independent in HEP and symptom management.     Plan     Continue with POC towards goals.     Paty Velez, PT

## 2020-06-29 ENCOUNTER — CLINICAL SUPPORT (OUTPATIENT)
Dept: REHABILITATION | Facility: HOSPITAL | Age: 55
End: 2020-06-29
Payer: COMMERCIAL

## 2020-06-29 DIAGNOSIS — R26.9 IMPAIRED GAIT: ICD-10-CM

## 2020-06-29 DIAGNOSIS — M25.652 DECREASED RANGE OF LEFT HIP MOVEMENT: ICD-10-CM

## 2020-06-29 DIAGNOSIS — R29.898 DECREASED STRENGTH OF LOWER EXTREMITY: ICD-10-CM

## 2020-06-29 DIAGNOSIS — M53.86 DECREASED RANGE OF MOTION OF LUMBAR SPINE: ICD-10-CM

## 2020-06-29 DIAGNOSIS — R26.89 DECREASED FUNCTIONAL MOBILITY: ICD-10-CM

## 2020-06-29 PROCEDURE — 97110 THERAPEUTIC EXERCISES: CPT | Mod: PN

## 2020-06-29 NOTE — PROGRESS NOTES
"  Physical Therapy Daily Treatment Note     Name: Janice Ramos  Clinic Number: [id number]    Therapy Diagnosis:   Encounter Diagnoses   Name Primary?    Decreased range of motion of lumbar spine     Decreased range of left hip movement     Decreased functional mobility     Impaired gait     Decreased strength of lower extremity      Physician: Emily Nassar MD    Visit Date: 6/29/2020    Physician Orders: PT Eval and Treat Begin PWB 25% x 2 weeks, then 50% x 2 weeks, then WBAT x 2 weeks; eval and treat 3x/wk x 6 wks- From 3/10 Referral   Medical Diagnosis from Referral: S32.811A (ICD-10-CM) - Multiple fractures of pelvis with unstable disruption of pelvic ring, initial encounter for closed fracture Z98.890,Z87.81 (ICD-10-CM) - S/P ORIF (open reduction internal fixation) fracture   Evaluation Date: 5/21/2020  Authorization Period Expiration: 03/10/2021  Plan of Care Expiration: 08/21/2020  Visit # / Visits authorized: 11/ 20 (1 prior)      Time In: 2:45 PM  Time Out: 3:30 PM  Total Billable Time: 45 minutes    Precautions: Standard, s/p pelvic ORIF, DVT LLE    Subjective     Pt reports: her back is doing well today, but her ankle is sore. She was on her feet a lot over the weekend.   She was compliant with home exercise program.  Response to previous treatment: increased soreness   Functional change: able to swim     Pain: 4/10  Location: left hip      Objective     Janice received therapeutic exercises to develop strength, endurance, ROM, flexibility, posture and core stabilization for 45 minutes including:  Seated Hip IR/ER YTB x 10 each LE  Lateral Walking in // bars Y Loop x 4 laps   Standing Hip Abd/Ext Y Loop 2 x 10 each   Shuttle 2.5 bands 2 x 10 BLE YTB at knees  Shuttle 1.5 band 2 x 10 Uni  YTB at knees  Pilates step L2.5 2 x 10 each LE  SLS x 30" each LE  Supine DKTC with ball x 2 min   Modified HSS Supine 3 x 20" each LE  Recumbent Bike Seat 5 Level 3 x 5 min     Not Performed Today:  BKFO " "GTB 2 x 10    Bridge GTB x 20  Supine piriformis stretch 3 x 20" LLE  Lateral Step Ups 4" step x 10 each LE   Standing Marches on foam 2 x 30"  (NP) Mini Squats x 10       Patient participated in neuromuscular re-education activities to improve: Balance, Coordination, Kinesthetic, Sense, Proprioception and Posture for 0 minutes. The following activities were included:   So Step Overs in // Bars Fwd and Lateral x 4 laps each with varying UE support     Pt deferred ice to home    Home Exercises Provided and Patient Education Provided     Education provided:   - New HEP   - Gait mechanics, use of cane at home and walker in community   - Scar Massage     Written Home Exercises Provided: Patient instructed to cont prior HEP.  Exercises were reviewed and Janice was able to demonstrate them prior to the end of the session.  Janice demonstrated good  understanding of the education provided.     See EMR under Patient Instructions for exercises provided 5/25/2020.    Assessment     Pt able to progress LE strengthening throughout treatment session today. Continued difficulty with lumbar extension and upright posture due to pain over sacrum. Pt with increased antalgic gait today throughout treatment session, and will benefit from focused gait training in future visits. Will continue to progress as tolerated.     Janice is progressing well towards her goals.   Pt prognosis is Good.     Pt will continue to benefit from skilled outpatient physical therapy to address the deficits listed in the problem list box on initial evaluation, provide pt/family education and to maximize pt's level of independence in the home and community environment.     Pt's spiritual, cultural and educational needs considered and pt agreeable to plan of care and goals.     Anticipated barriers to physical therapy: None     Goals:  Short Term Goals: 6 weeks (progressing, not met)  Pt will report decreased hip and LE pain to 6/10 at worst to increase " tolerance for ADLs.   Pt will ambulate 200 feet with minimal to no gait deviations and least restrictive assistive device MET  Pt will increase L hip flexion by 10 degrees to increase tolerance for bed mobility and transfers. MET  Pt to tolerate HEP to improve ROM and independence with ADL's MET     Long Term Goals: 12 weeks (progressing, not met)  Pt will report > / = 30% decrease in difficulty tolerating prolonged standing in order to improve ability to perform household chores.   Increased lumbar flexion and extension ROM to 70% for increased ease with daily activities  Pt will ambulate 500 feet with minimal to no gait deviations and least restrictive AD to increase tolerance for community ambulation   Pt will be independent in all bathroom ADLs to reduce caregiver burden and improve pt quality of life.   Pt will be independent in HEP and symptom management.     Plan     Continue with POC towards goals.     Paty Velez, PT

## 2020-06-30 ENCOUNTER — PATIENT OUTREACH (OUTPATIENT)
Dept: ADMINISTRATIVE | Facility: OTHER | Age: 55
End: 2020-06-30

## 2020-07-01 ENCOUNTER — CLINICAL SUPPORT (OUTPATIENT)
Dept: REHABILITATION | Facility: HOSPITAL | Age: 55
End: 2020-07-01
Payer: COMMERCIAL

## 2020-07-01 DIAGNOSIS — R29.898 DECREASED STRENGTH OF LOWER EXTREMITY: ICD-10-CM

## 2020-07-01 DIAGNOSIS — M25.652 DECREASED RANGE OF LEFT HIP MOVEMENT: ICD-10-CM

## 2020-07-01 DIAGNOSIS — R26.9 IMPAIRED GAIT: ICD-10-CM

## 2020-07-01 DIAGNOSIS — R26.89 DECREASED FUNCTIONAL MOBILITY: ICD-10-CM

## 2020-07-01 DIAGNOSIS — M53.86 DECREASED RANGE OF MOTION OF LUMBAR SPINE: ICD-10-CM

## 2020-07-01 PROCEDURE — 97110 THERAPEUTIC EXERCISES: CPT | Mod: PN

## 2020-07-01 NOTE — PROGRESS NOTES
Physical Therapy Daily Treatment Note     Name: Janice Ramos  Clinic Number: [id number]    Therapy Diagnosis:   Encounter Diagnoses   Name Primary?    Decreased range of motion of lumbar spine     Decreased range of left hip movement     Decreased functional mobility     Impaired gait     Decreased strength of lower extremity      Physician: Emily Nassar MD    Visit Date: 7/1/2020    Physician Orders: PT Eval and Treat Begin PWB 25% x 2 weeks, then 50% x 2 weeks, then WBAT x 2 weeks; eval and treat 3x/wk x 6 wks- From 3/10 Referral   Medical Diagnosis from Referral: S32.811A (ICD-10-CM) - Multiple fractures of pelvis with unstable disruption of pelvic ring, initial encounter for closed fracture Z98.890,Z87.81 (ICD-10-CM) - S/P ORIF (open reduction internal fixation) fracture   Evaluation Date: 5/21/2020  Authorization Period Expiration: 03/10/2021  Plan of Care Expiration: 08/21/2020  Visit # / Visits authorized: 11/ 20 (1 prior)    Time In: 12:15 PM  Time Out: 1:10 PM  Total Billable Time: 55 minutes    Precautions: Standard, s/p pelvic ORIF, DVT LLE    Subjective     Pt reports: her back is doing well today, but her ankle is sore. She was on her feet a lot over the weekend.   She was compliant with home exercise program.  Response to previous treatment: increased soreness   Functional change: able to swim     Pain: 4/10  Location: left hip      Objective     Janice received therapeutic exercises to develop strength, endurance, ROM, flexibility, posture and core stabilization for 55 minutes including:  Seated Hip IR/ER YTB x 15 each LE  Supine DKTC with ball x 2 min   Lateral Walking in // bars Y Loop x 4 laps   Backwards Walking in // bars Y loop x 3 laps   Standing Hip Abd/Ext Y Loop 2 x 10 each   Shld ext pulldown BTB w TA set 2 x 10   Pallof Press YTB x 10 each   Shuttle 2.5 bands 2 x 10 BLE YTB at knees  Shuttle 1.5 band 2 x 10 Uni  YTB at knees  Pilates step L2.5 2 x 10 each LE  Recumbent  "Bike Seat 5 Level 3 x 5 min     (next visit: squatting, stooping, kneeling, transfers)    Not Performed Today:  SLS x 30" each LE  Modified HSS Supine 3 x 20" each LE  BKFO GTB 2 x 10    Bridge GTB x 20  Supine piriformis stretch 3 x 20" LLE  Lateral Step Ups 4" step x 10 each LE   Standing Marches on foam 2 x 30"  (NP) Mini Squats x 10       Patient participated in neuromuscular re-education activities to improve: Balance, Coordination, Kinesthetic, Sense, Proprioception and Posture for 0 minutes. The following activities were included:   So Step Overs in // Bars Fwd and Lateral x 4 laps each with varying UE support     Pt deferred ice to home    Home Exercises Provided and Patient Education Provided     Education provided:   - New HEP   - Gait mechanics, use of cane at home and walker in community   - Scar Massage     Written Home Exercises Provided: Patient instructed to cont prior HEP.  Exercises were reviewed and Janice was able to demonstrate them prior to the end of the session.  Janice demonstrated good  understanding of the education provided.     See EMR under Patient Instructions for exercises provided 5/25/2020.    Assessment     Pt able to progress therex today to include core stabilization exercises from improved lumbar stability with functional tasks. Pt with improved glut activation of LLE, although continued weakness throughout glut max and med. Pt with continued ankle pain and swelling, limiting gait mechanics. Pt will benefit from transfer and kneeling training next visit to practice for return to work activities. Will continue to progress as tolerated.     Janice is progressing well towards her goals.   Pt prognosis is Good.     Pt will continue to benefit from skilled outpatient physical therapy to address the deficits listed in the problem list box on initial evaluation, provide pt/family education and to maximize pt's level of independence in the home and community environment.     Pt's " spiritual, cultural and educational needs considered and pt agreeable to plan of care and goals.     Anticipated barriers to physical therapy: None     Goals:  Short Term Goals: 6 weeks (progressing, not met)  Pt will report decreased hip and LE pain to 6/10 at worst to increase tolerance for ADLs.   Pt will ambulate 200 feet with minimal to no gait deviations and least restrictive assistive device MET  Pt will increase L hip flexion by 10 degrees to increase tolerance for bed mobility and transfers. MET  Pt to tolerate HEP to improve ROM and independence with ADL's MET     Long Term Goals: 12 weeks (progressing, not met)  Pt will report > / = 30% decrease in difficulty tolerating prolonged standing in order to improve ability to perform household chores.   Increased lumbar flexion and extension ROM to 70% for increased ease with daily activities  Pt will ambulate 500 feet with minimal to no gait deviations and least restrictive AD to increase tolerance for community ambulation   Pt will be independent in all bathroom ADLs to reduce caregiver burden and improve pt quality of life.   Pt will be independent in HEP and symptom management.     Plan     Continue with POC towards goals.     Paty Velez, PT

## 2020-07-02 ENCOUNTER — OFFICE VISIT (OUTPATIENT)
Dept: ORTHOPEDICS | Facility: CLINIC | Age: 55
End: 2020-07-02
Payer: COMMERCIAL

## 2020-07-02 VITALS
BODY MASS INDEX: 29.78 KG/M2 | DIASTOLIC BLOOD PRESSURE: 69 MMHG | WEIGHT: 151.69 LBS | SYSTOLIC BLOOD PRESSURE: 125 MMHG | HEART RATE: 61 BPM | HEIGHT: 60 IN

## 2020-07-02 DIAGNOSIS — Z98.890 S/P ORIF (OPEN REDUCTION INTERNAL FIXATION) FRACTURE: ICD-10-CM

## 2020-07-02 DIAGNOSIS — S32.811A MULTIPLE FRACTURES OF PELVIS WITH UNSTABLE DISRUPTION OF PELVIC RING, INITIAL ENCOUNTER FOR CLOSED FRACTURE: ICD-10-CM

## 2020-07-02 DIAGNOSIS — M70.62 GREATER TROCHANTERIC BURSITIS OF LEFT HIP: Primary | ICD-10-CM

## 2020-07-02 DIAGNOSIS — Z87.81 S/P ORIF (OPEN REDUCTION INTERNAL FIXATION) FRACTURE: ICD-10-CM

## 2020-07-02 PROCEDURE — 20610 LARGE JOINT ASPIRATION/INJECTION: L GREATER TROCHANTERIC BURSA: ICD-10-PCS | Mod: LT,S$GLB,, | Performed by: ORTHOPAEDIC SURGERY

## 2020-07-02 PROCEDURE — 99214 OFFICE O/P EST MOD 30 MIN: CPT | Mod: 25,S$GLB,, | Performed by: ORTHOPAEDIC SURGERY

## 2020-07-02 PROCEDURE — 99999 PR PBB SHADOW E&M-EST. PATIENT-LVL III: CPT | Mod: PBBFAC,,, | Performed by: ORTHOPAEDIC SURGERY

## 2020-07-02 PROCEDURE — 99214 PR OFFICE/OUTPT VISIT, EST, LEVL IV, 30-39 MIN: ICD-10-PCS | Mod: 25,S$GLB,, | Performed by: ORTHOPAEDIC SURGERY

## 2020-07-02 PROCEDURE — 3008F BODY MASS INDEX DOCD: CPT | Mod: CPTII,S$GLB,, | Performed by: ORTHOPAEDIC SURGERY

## 2020-07-02 PROCEDURE — 99999 PR PBB SHADOW E&M-EST. PATIENT-LVL III: ICD-10-PCS | Mod: PBBFAC,,, | Performed by: ORTHOPAEDIC SURGERY

## 2020-07-02 PROCEDURE — 3008F PR BODY MASS INDEX (BMI) DOCUMENTED: ICD-10-PCS | Mod: CPTII,S$GLB,, | Performed by: ORTHOPAEDIC SURGERY

## 2020-07-02 PROCEDURE — 20610 DRAIN/INJ JOINT/BURSA W/O US: CPT | Mod: LT,S$GLB,, | Performed by: ORTHOPAEDIC SURGERY

## 2020-07-02 RX ORDER — METHOCARBAMOL 750 MG/1
750 TABLET, FILM COATED ORAL 3 TIMES DAILY PRN
Qty: 33 TABLET | Refills: 0 | Status: SHIPPED | OUTPATIENT
Start: 2020-07-02 | End: 2021-03-09 | Stop reason: SDUPTHER

## 2020-07-02 RX ADMIN — TRIAMCINOLONE ACETONIDE 40 MG: 40 INJECTION, SUSPENSION INTRA-ARTICULAR; INTRAMUSCULAR at 02:07

## 2020-07-02 NOTE — PROCEDURES
Large Joint Aspiration/Injection: L greater trochanteric bursa    Date/Time: 7/2/2020 2:15 PM  Performed by: Bola Mera MD  Authorized by: Bola Mera MD     Consent Done?:  Yes (Verbal)  Indications:  Pain  Timeout: prior to procedure the correct patient, procedure, and site was verified    Prep: patient was prepped and draped in usual sterile fashion      Local anesthesia used?: Yes    Local anesthetic:  Lidocaine 1% without epinephrine  Anesthetic total (ml):  5      Details:  Needle Size:  21 G  Approach:  Lateral  Location:  Hip  Site:  L greater trochanteric bursa  Medications:  40 mg triamcinolone acetonide 40 mg/mL  Patient tolerance:  Patient tolerated the procedure well with no immediate complications

## 2020-07-02 NOTE — PROGRESS NOTES
Chief Complaint   Patient presents with    Left Hip - Pain      HPI:   This is a 55 y.o. who presents to clinic today for left hip pain for the past few weeks. Complains of pain while sleeping on side and when descending stairs. She is status post ORIF pelvic ring 6 months ago by Dr. Porras.  Pain is dull. No numbness or tingling. No associated signs or symptoms.    Past Medical History:   Diagnosis Date    Anxiety     Bell's palsy     R eye with residual deficit    Fatigue     Hypercholesteremia     Stroke 2014    Vertigo      Past Surgical History:   Procedure Laterality Date    BONY PELVIS SURGERY      ORIF Pelvis     SECTION      COLONOSCOPY N/A 2018    Procedure: COLONOSCOPY;  Surgeon: Lowell Bowen Jr., MD;  Location: [facility];  Service: Endoscopy;  Laterality: N/A;    TONSILLECTOMY       Current Outpatient Medications on File Prior to Visit   Medication Sig Dispense Refill    apixaban (ELIQUIS) 5 mg Tab Take 1 tablet (5 mg total) by mouth 2 (two) times daily. 180 tablet 0    atorvastatin (LIPITOR) 80 MG tablet Take 1 tablet (80 mg total) by mouth once daily. 90 tablet 1    b complex vitamins tablet Take 1 tablet by mouth once daily.      cetirizine (ZYRTEC) 10 MG tablet Take 10 mg by mouth once daily.      escitalopram oxalate (LEXAPRO) 20 MG tablet TAKE ONE TABLET BY MOUTH ONCE DAILY 30 tablet 1    multivitamin (THERAGRAN) per tablet Take 1 tablet by mouth once daily.      ondansetron (ZOFRAN-ODT) 4 MG TbDL       oxyCODONE (ROXICODONE) 5 MG immediate release tablet Take 1 tablet (5 mg total) by mouth every 24 hours as needed for Pain. 30 tablet 0    [DISCONTINUED] methocarbamoL (ROBAXIN) 750 MG Tab Take 1 tablet (750 mg total) by mouth daily as needed. 60 tablet 1    diclofenac sodium (VOLTAREN) 1 % Gel Apply 2 g topically as needed.       fluticasone (FLONASE) 50 mcg/actuation nasal spray 1 spray by Each Nare route once daily. (Patient not taking:  Reported on 2020) 1 Bottle 11     No current facility-administered medications on file prior to visit.      Review of patient's allergies indicates:   Allergen Reactions    Codeine Nausea Only     Family History   Problem Relation Age of Onset    Cancer Mother         cervical ca    Heart disease Brother         arrythmia    Cancer Paternal Grandmother         colon cancer    Breast cancer Maternal Aunt      Social History     Socioeconomic History    Marital status:      Spouse name: Not on file    Number of children: Not on file    Years of education: Not on file    Highest education level: Not on file   Occupational History     Employer: [employer]   Social Needs    Financial resource strain: Not on file    Food insecurity     Worry: Not on file     Inability: Not on file    Transportation needs     Medical: Not on file     Non-medical: Not on file   Tobacco Use    Smoking status: Former Smoker     Quit date: 1998     Years since quittin.6    Smokeless tobacco: Never Used   Substance and Sexual Activity    Alcohol use: Yes     Alcohol/week: 2.0 standard drinks     Types: 2 Shots of liquor per week     Comment: social    Drug use: No    Sexual activity: Yes     Partners: Male   Lifestyle    Physical activity     Days per week: Not on file     Minutes per session: Not on file    Stress: Not on file   Relationships    Social connections     Talks on phone: Not on file     Gets together: Not on file     Attends Christian service: Not on file     Active member of club or organization: Not on file     Attends meetings of clubs or organizations: Not on file     Relationship status: Not on file   Other Topics Concern    Not on file   Social History Narrative    Works in [employer] at [employer].       Review of Systems:  Constitutional:  Denies fever or chills   Eyes:  Denies change in visual acuity   HENT:  Denies nasal congestion or sore throat   Respiratory:   Denies cough or shortness of breath   Cardiovascular:  Denies chest pain or edema   GI:  Denies abdominal pain, nausea, vomiting, bloody stools or diarrhea   :  Denies dysuria   Integument:  Denies rash   Neurologic:  Denies headache, focal weakness or sensory changes   Endocrine:  Denies polyuria or polydipsia   Lymphatic:  Denies swollen glands   Psychiatric:  Denies depression or anxiety     Physical Exam:   Constitutional:  Well developed, well nourished, no acute distress, non-toxic appearance   Integument:  Well hydrated, no rash   Lymphatic:  No lymphadenopathy noted   Neurologic:  Alert & oriented x 3  Psychiatric:  Speech and behavior appropriate     Bilateral Hip Exam    right Hip Exam   right hip exam performed same as contralateral hip and is normal.     left Hip Exam   Tenderness   The patient is experiencing tenderness in the greater trochanter.  Range of Motion   The patient has normal hip ROM.  Muscle Strength   Abduction: 4/5   Other   Erythema: absent  Sensation: normal  Pulse: present        Greater trochanteric bursitis of left hip  -     Large Joint Aspiration/Injection: L greater trochanteric bursa    Multiple fractures of pelvis with unstable disruption of pelvic ring, initial encounter for closed fracture  -     methocarbamoL (ROBAXIN) 750 MG Tab; Take 1 tablet (750 mg total) by mouth 3 (three) times daily as needed.  Dispense: 33 tablet; Refill: 0    S/P ORIF (open reduction internal fixation) fracture  -     methocarbamoL (ROBAXIN) 750 MG Tab; Take 1 tablet (750 mg total) by mouth 3 (three) times daily as needed.  Dispense: 33 tablet; Refill: 0           Using an aseptic technique, I injected 5 cc of lidocaine 1% without and 1 cc of kenalog 40mg into the left Hip. The patient tolerated this well. I will have them return to clinic as needed.

## 2020-07-06 ENCOUNTER — CLINICAL SUPPORT (OUTPATIENT)
Dept: REHABILITATION | Facility: HOSPITAL | Age: 55
End: 2020-07-06
Payer: COMMERCIAL

## 2020-07-06 DIAGNOSIS — R29.898 DECREASED STRENGTH OF LOWER EXTREMITY: ICD-10-CM

## 2020-07-06 DIAGNOSIS — M53.86 DECREASED RANGE OF MOTION OF LUMBAR SPINE: ICD-10-CM

## 2020-07-06 DIAGNOSIS — M25.652 DECREASED RANGE OF LEFT HIP MOVEMENT: ICD-10-CM

## 2020-07-06 DIAGNOSIS — R26.9 IMPAIRED GAIT: ICD-10-CM

## 2020-07-06 DIAGNOSIS — R26.89 DECREASED FUNCTIONAL MOBILITY: ICD-10-CM

## 2020-07-06 PROCEDURE — 97140 MANUAL THERAPY 1/> REGIONS: CPT | Mod: PN

## 2020-07-06 PROCEDURE — 97110 THERAPEUTIC EXERCISES: CPT | Mod: PN

## 2020-07-06 NOTE — PROGRESS NOTES
"  Physical Therapy Daily Treatment Note     Name: Janice Ramos  Clinic Number: [id number]    Therapy Diagnosis:   Encounter Diagnoses   Name Primary?    Decreased range of motion of lumbar spine     Decreased range of left hip movement     Decreased functional mobility     Impaired gait     Decreased strength of lower extremity      Physician: Emily Nassar MD    Visit Date: 7/6/2020    Physician Orders: PT Eval and Treat Begin PWB 25% x 2 weeks, then 50% x 2 weeks, then WBAT x 2 weeks; eval and treat 3x/wk x 6 wks- From 3/10 Referral   Medical Diagnosis from Referral: S32.811A (ICD-10-CM) - Multiple fractures of pelvis with unstable disruption of pelvic ring, initial encounter for closed fracture Z98.890,Z87.81 (ICD-10-CM) - S/P ORIF (open reduction internal fixation) fracture   Evaluation Date: 5/21/2020  Authorization Period Expiration: 03/10/2021  Plan of Care Expiration: 08/21/2020  Visit # / Visits authorized: 11/ 20 (1 prior)    Time In: 2:35 PM  Time Out: 3:20 PM  Total Billable Time: 45 minutes    Precautions: Standard, s/p pelvic ORIF, DVT LLE    Subjective     Pt reports: she went to the MD since last visit and had an injection in her hip. Pt states she is still sore from the injection.   She was compliant with home exercise program.  Response to previous treatment: increased soreness   Functional change: able to swim     Pain: 4/10  Location: left hip      Objective     Pt presented with superior elevation of R innominate in standing, with anterior rotation of R innominate in supine and standing.     Janice received the following manual therapy techniques: Soft tissue Mobilization were applied to the: L ITB for 8 minutes.  Gentle STM to ITB of LLE  (vacuum cupping next visit)    Janice received therapeutic exercises to develop strength, endurance, ROM, flexibility, posture and core stabilization for 37 minutes including:  MET R AR 3 x 3"   SL clams x 10 LLE only   Seated Hip IR/ER YTB x " "15 each LE  Lateral Walking in // bars Y Loop x 4 laps   Backwards Walking in // bars Y loop x 3 laps   Shld ext pulldown BTB w TA set 2 x 10   Pallof Press YTB x 10 each   Semi Prone Hip Ext x 10 Y loop   Recumbent Bike Seat 5 Level 3 x 5 min     Not Performed:   Supine DKTC with ball x 2 min   Standing Hip Abd/Ext Y Loop 2 x 10 each   Shuttle 2.5 bands 2 x 10 BLE YTB at knees  Shuttle 1.5 band 2 x 10 Uni  YTB at knees  Pilates step L2.5 2 x 10 each LE      (next visit: squatting, stooping, kneeling, transfers)    Not Performed Today:  SLS x 30" each LE  Modified HSS Supine 3 x 20" each LE  BKFO GTB 2 x 10    Bridge GTB x 20  Supine piriformis stretch 3 x 20" LLE  Lateral Step Ups 4" step x 10 each LE   Standing Marches on foam 2 x 30"  (NP) Mini Squats x 10       Patient participated in neuromuscular re-education activities to improve: Balance, Coordination, Kinesthetic, Sense, Proprioception and Posture for 0 minutes. The following activities were included:   So Step Overs in // Bars Fwd and Lateral x 4 laps each with varying UE support     Pt deferred ice to home    Home Exercises Provided and Patient Education Provided     Education provided:   - New HEP   - Gait mechanics, use of cane at home and walker in community   - Scar Massage     Written Home Exercises Provided: Patient instructed to cont prior HEP.  Exercises were reviewed and Janice was able to demonstrate them prior to the end of the session.  Janice demonstrated good  understanding of the education provided.     See EMR under Patient Instructions for exercises provided 5/25/2020.    Assessment     Pt demonstrated pelvic dysfunction upon arrival to treatment session today. Able to correct with MET; however, pt with continued elevation of R innominate. Pt able to perform clam exercise today to increase glut med activation. Pt initially with increased TFL activation; however was able to correct with cueing. Pt may benefit from vacuum cupping next " visit for improved myofascial mobility of L ITB to allow for appropriate posterior chain muscle activation. Will continue to progress as tolerated.     Janice is progressing well towards her goals.   Pt prognosis is Good.     Pt will continue to benefit from skilled outpatient physical therapy to address the deficits listed in the problem list box on initial evaluation, provide pt/family education and to maximize pt's level of independence in the home and community environment.     Pt's spiritual, cultural and educational needs considered and pt agreeable to plan of care and goals.     Anticipated barriers to physical therapy: None     Goals:  Short Term Goals: 6 weeks (progressing, not met)  Pt will report decreased hip and LE pain to 6/10 at worst to increase tolerance for ADLs.   Pt will ambulate 200 feet with minimal to no gait deviations and least restrictive assistive device MET  Pt will increase L hip flexion by 10 degrees to increase tolerance for bed mobility and transfers. MET  Pt to tolerate HEP to improve ROM and independence with ADL's MET     Long Term Goals: 12 weeks (progressing, not met)  Pt will report > / = 30% decrease in difficulty tolerating prolonged standing in order to improve ability to perform household chores.   Increased lumbar flexion and extension ROM to 70% for increased ease with daily activities  Pt will ambulate 500 feet with minimal to no gait deviations and least restrictive AD to increase tolerance for community ambulation   Pt will be independent in all bathroom ADLs to reduce caregiver burden and improve pt quality of life.   Pt will be independent in HEP and symptom management.     Plan     Continue with POC towards goals.     Paty Velez, PT

## 2020-07-09 ENCOUNTER — CLINICAL SUPPORT (OUTPATIENT)
Dept: REHABILITATION | Facility: HOSPITAL | Age: 55
End: 2020-07-09
Payer: COMMERCIAL

## 2020-07-09 DIAGNOSIS — R26.9 IMPAIRED GAIT: ICD-10-CM

## 2020-07-09 DIAGNOSIS — M25.652 DECREASED RANGE OF LEFT HIP MOVEMENT: ICD-10-CM

## 2020-07-09 DIAGNOSIS — R26.89 DECREASED FUNCTIONAL MOBILITY: ICD-10-CM

## 2020-07-09 DIAGNOSIS — R29.898 DECREASED STRENGTH OF LOWER EXTREMITY: ICD-10-CM

## 2020-07-09 DIAGNOSIS — M53.86 DECREASED RANGE OF MOTION OF LUMBAR SPINE: ICD-10-CM

## 2020-07-09 PROCEDURE — 97110 THERAPEUTIC EXERCISES: CPT | Mod: PN

## 2020-07-09 PROCEDURE — 97140 MANUAL THERAPY 1/> REGIONS: CPT | Mod: PN

## 2020-07-09 NOTE — PROGRESS NOTES
Physical Therapy Daily Treatment Note     Name: Janice Ramos  Clinic Number: [id number]    Therapy Diagnosis:   Encounter Diagnoses   Name Primary?    Decreased range of motion of lumbar spine     Decreased range of left hip movement     Decreased functional mobility     Impaired gait     Decreased strength of lower extremity      Physician: Emily Nassar MD    Visit Date: 7/9/2020    Physician Orders: PT Eval and Treat Begin PWB 25% x 2 weeks, then 50% x 2 weeks, then WBAT x 2 weeks; eval and treat 3x/wk x 6 wks- From 3/10 Referral   Medical Diagnosis from Referral: S32.811A (ICD-10-CM) - Multiple fractures of pelvis with unstable disruption of pelvic ring, initial encounter for closed fracture Z98.890,Z87.81 (ICD-10-CM) - S/P ORIF (open reduction internal fixation) fracture   Evaluation Date: 5/21/2020  Authorization Period Expiration: 03/10/2021  Plan of Care Expiration: 08/21/2020  Visit # / Visits authorized: 14/ 20 (1 prior)    Time In: 2:50 PM  Time Out: 3:20 PM  Total Billable Time: 45 minutes    Precautions: Standard, s/p pelvic ORIF, DVT LLE    Subjective     Pt reports: the soreness from her injection has improved. Pt states she has a firm area on the R side of the abdomen which is tender.   She was compliant with home exercise program.  Response to previous treatment: increased soreness   Functional change: able to swim     Pain: 4/10  Location: left hip      Objective     Pt presented with superior elevation of R innominate in standing    Janice received the following manual therapy techniques: Soft tissue Mobilization were applied to the: L ITB for 10 minutes.  Gentle STM to ITB of LLE  Vacuum/cupping STM with manual therapy techniques was performed to L ITB to decrease muscle tightness, increase circulation and promote healing process.  The pt's skin was monitored for redness adjusting pressure as needed. The pt was instructed in possible side effects of bruising and/or soreness.   "    Janice received therapeutic exercises to develop strength, endurance, ROM, flexibility, posture and core stabilization for 50 minutes including:  (NP) MET R AR 3 x 3"   (NP - today only) SL clams x 10 LLE only   Seated Hip IR/ER YTB x 15 each LE  Lateral Walking in // bars Y Loop x 4 laps   Backwards Walking in // bars Y loop x 3 laps   Sit <> Stand 18" box x 10   Sit <> stand 12" box with Airex x 10   Shuttle 2.5 bands 2 x 10 BLE YTB at knees  Shuttle 1.5 band 2 x 10 Uni  YTB at knees  Pilates step L3 2 x 10 each LE  Semi Prone Hip Ext/Abd 2 x 10 Y loop   Recumbent Bike Seat 5 Level 3 x 5 min     Not Performed:   Supine DKTC with ball x 2 min   Standing Hip Abd/Ext Y Loop 2 x 10 each   Shld ext pulldown BTB w TA set 2 x 10   Pallof Press YTB x 10 each     (next visit: squatting, stooping, kneeling, transfers)    Not Performed Today:  SLS x 30" each LE  Modified HSS Supine 3 x 20" each LE  BKFO GTB 2 x 10    Bridge GTB x 20  Supine piriformis stretch 3 x 20" LLE  Lateral Step Ups 4" step x 10 each LE   Standing Marches on foam 2 x 30"  (NP) Mini Squats x 10     Home Exercises Provided and Patient Education Provided     Education provided:   - New HEP   - Gait mechanics, use of cane at home and walker in community   - Scar Massage     Written Home Exercises Provided: Patient instructed to cont prior HEP.  Exercises were reviewed and Janice was able to demonstrate them prior to the end of the session.  Janice demonstrated good  understanding of the education provided.     See EMR under Patient Instructions for exercises provided 5/25/2020.    Assessment     Pt displayed firmness to R lower abdomen today in standing which dissipated while supine. Pt display mild soft tissue restriction in affected region of abdomen, but discomfort was deep. Pt encouraged to communicate with PCP in regards to discomfort for further evaluation. Pt demonstrated increased soft tissue restriction along L ITB which improved with " "addition of vacuum cupping. Pt with increased discomfort on distal end of ITB during cupping. Pt able to perform sit<> stand from 12" box today with airex placed on top. Pt required min UE support initially, but was then able to perform reps without UEs. Will continue to progress as tolerated.     Janice is progressing well towards her goals.   Pt prognosis is Good.     Pt will continue to benefit from skilled outpatient physical therapy to address the deficits listed in the problem list box on initial evaluation, provide pt/family education and to maximize pt's level of independence in the home and community environment.     Pt's spiritual, cultural and educational needs considered and pt agreeable to plan of care and goals.     Anticipated barriers to physical therapy: None     Goals:  Short Term Goals: 6 weeks (progressing, not met)  Pt will report decreased hip and LE pain to 6/10 at worst to increase tolerance for ADLs.   Pt will ambulate 200 feet with minimal to no gait deviations and least restrictive assistive device MET  Pt will increase L hip flexion by 10 degrees to increase tolerance for bed mobility and transfers. MET  Pt to tolerate HEP to improve ROM and independence with ADL's MET     Long Term Goals: 12 weeks (progressing, not met)  Pt will report > / = 30% decrease in difficulty tolerating prolonged standing in order to improve ability to perform household chores.   Increased lumbar flexion and extension ROM to 70% for increased ease with daily activities  Pt will ambulate 500 feet with minimal to no gait deviations and least restrictive AD to increase tolerance for community ambulation   Pt will be independent in all bathroom ADLs to reduce caregiver burden and improve pt quality of life.   Pt will be independent in HEP and symptom management.     Plan     Continue with POC towards goals.     Paty Velez, PT     "

## 2020-07-10 RX ORDER — TRIAMCINOLONE ACETONIDE 40 MG/ML
40 INJECTION, SUSPENSION INTRA-ARTICULAR; INTRAMUSCULAR
Status: DISCONTINUED | OUTPATIENT
Start: 2020-07-02 | End: 2020-07-10 | Stop reason: HOSPADM

## 2020-07-13 ENCOUNTER — NURSE TRIAGE (OUTPATIENT)
Dept: ADMINISTRATIVE | Facility: CLINIC | Age: 55
End: 2020-07-13

## 2020-07-13 ENCOUNTER — TELEPHONE (OUTPATIENT)
Dept: FAMILY MEDICINE | Facility: CLINIC | Age: 55
End: 2020-07-13

## 2020-07-13 ENCOUNTER — CLINICAL SUPPORT (OUTPATIENT)
Dept: REHABILITATION | Facility: HOSPITAL | Age: 55
End: 2020-07-13
Payer: COMMERCIAL

## 2020-07-13 DIAGNOSIS — R29.898 DECREASED STRENGTH OF LOWER EXTREMITY: ICD-10-CM

## 2020-07-13 DIAGNOSIS — R10.32 LEFT LOWER QUADRANT PAIN: ICD-10-CM

## 2020-07-13 DIAGNOSIS — M53.86 DECREASED RANGE OF MOTION OF LUMBAR SPINE: ICD-10-CM

## 2020-07-13 DIAGNOSIS — R26.89 DECREASED FUNCTIONAL MOBILITY: ICD-10-CM

## 2020-07-13 DIAGNOSIS — M25.652 DECREASED RANGE OF LEFT HIP MOVEMENT: ICD-10-CM

## 2020-07-13 DIAGNOSIS — R26.9 IMPAIRED GAIT: ICD-10-CM

## 2020-07-13 DIAGNOSIS — K40.90 NON-RECURRENT UNILATERAL INGUINAL HERNIA WITHOUT OBSTRUCTION OR GANGRENE: ICD-10-CM

## 2020-07-13 PROCEDURE — 97140 MANUAL THERAPY 1/> REGIONS: CPT | Mod: PN

## 2020-07-13 PROCEDURE — 97110 THERAPEUTIC EXERCISES: CPT | Mod: PN

## 2020-07-13 NOTE — TELEPHONE ENCOUNTER
Pt complaining of abdominal pain and would like US. Called triage nurse and was advised to go to ER but pt refused.   Please advise

## 2020-07-13 NOTE — TELEPHONE ENCOUNTER
Spoke with pt, she is c/o abd pain on her R side that she describes as a dull ache and there is a slight bulge. No fever, nausea, vomiting, diarrhea or constipation. She is doing a lot of swimming and PT which worsens ache bc she is moving and stretching. She is asking if Dr. Nassar wants an US before her appt on Friday.

## 2020-07-13 NOTE — PROGRESS NOTES
Physical Therapy Daily Treatment Note     Name: Janice Ramos  Clinic Number: [id number]    Therapy Diagnosis:   Encounter Diagnoses   Name Primary?    Decreased range of motion of lumbar spine     Decreased range of left hip movement     Decreased functional mobility     Impaired gait     Decreased strength of lower extremity      Physician: Emily Nassar MD    Visit Date: 7/13/2020    Physician Orders: PT Eval and Treat Begin PWB 25% x 2 weeks, then 50% x 2 weeks, then WBAT x 2 weeks; eval and treat 3x/wk x 6 wks- From 3/10 Referral   Medical Diagnosis from Referral: S32.811A (ICD-10-CM) - Multiple fractures of pelvis with unstable disruption of pelvic ring, initial encounter for closed fracture Z98.890,Z87.81 (ICD-10-CM) - S/P ORIF (open reduction internal fixation) fracture   Evaluation Date: 5/21/2020  Authorization Period Expiration: 03/10/2021  Plan of Care Expiration: 08/21/2020  Visit # / Visits authorized: 15/ 20 (1 prior)    Time In: 2:25 PM  Time Out: 3:25 PM  Total Billable Time: 60 minutes    Precautions: Standard, s/p pelvic ORIF, DVT LLE    Subjective     Pt reports: she is feeling pretty good today. She still has some pain in the R lower abdomen. Pt states she did called the nurse on call, who advised her to go to the ER. Pt states she did not feel her situation was that urgent, and is waiting to hear back from her MD.   She was compliant with home exercise program.  Response to previous treatment: increased soreness   Functional change: able to swim     Pain: 4/10 in R abdomen, 2/10 back and hip   Location: left hip      Objective     Pt presented with superior elevation of R innominate in standing    Janice received the following manual therapy techniques: Soft tissue Mobilization were applied to the: L ITB for 10 minutes.  Gentle STM to ITB of LLE  Vacuum/cupping STM with manual therapy techniques was performed to L ITB to decrease muscle tightness, increase circulation and  "promote healing process.  The pt's skin was monitored for redness adjusting pressure as needed. The pt was instructed in possible side effects of bruising and/or soreness.      Janice received therapeutic exercises to develop strength, endurance, ROM, flexibility, posture and core stabilization for 50 minutes including:  (NP) MET R AR 3 x 3"    SL clams 2 x 10 LLE only   Seated Hip IR/ER YTB x 20 each LE  Lateral Walking in // bars Y Loop x 4 laps   Backwards Walking in // bars Y loop x 3 laps   Sit <> Stand 18" box Y loop at knees x 10   Sit <> stand 12" box with Airex Y loop at knees x 10   Shuttle 2.5 bands 3 x 10 BLE YTB at knees  Shuttle 1.5 band 2 x 10 Uni  YTB at knees  Pilates step L3 2 x 10 each LE  Semi Prone Hip Ext/Abd 2 x 10 Y loop   Recumbent Bike Seat 5 Level 3 x 5 min     Not Performed:   Supine DKTC with ball x 2 min   Standing Hip Abd/Ext Y Loop 2 x 10 each   Shld ext pulldown BTB w TA set 2 x 10   Pallof Press YTB x 10 each     (next visit: squatting, stooping, kneeling, transfers)        Home Exercises Provided and Patient Education Provided     Education provided:   - New HEP   - Gait mechanics, use of cane at home and walker in community   - Scar Massage     Written Home Exercises Provided: Patient instructed to cont prior HEP.  Exercises were reviewed and Janice was able to demonstrate them prior to the end of the session.  Janice demonstrated good  understanding of the education provided.     See EMR under Patient Instructions for exercises provided 5/25/2020.    Assessment     Pt continues to demonstrate soft tissue restriction along L ITB with considerable TTP along distal portion. Pt responded well to cupping therapy although sensitive in distal region. Pt continues to demonstrate firmness along R lower abdominal region with mild pain to affected area. Pt informed to follow up with PCP in regards to new onset of symptoms. Pt displays improved glut activation of LLE with therex today. " Will continue to progress as tolerated.       Janice is progressing well towards her goals.   Pt prognosis is Good.     Pt will continue to benefit from skilled outpatient physical therapy to address the deficits listed in the problem list box on initial evaluation, provide pt/family education and to maximize pt's level of independence in the home and community environment.     Pt's spiritual, cultural and educational needs considered and pt agreeable to plan of care and goals.     Anticipated barriers to physical therapy: None     Goals:  Short Term Goals: 6 weeks (progressing, not met)  Pt will report decreased hip and LE pain to 6/10 at worst to increase tolerance for ADLs.   Pt will ambulate 200 feet with minimal to no gait deviations and least restrictive assistive device MET  Pt will increase L hip flexion by 10 degrees to increase tolerance for bed mobility and transfers. MET  Pt to tolerate HEP to improve ROM and independence with ADL's MET     Long Term Goals: 12 weeks (progressing, not met)  Pt will report > / = 30% decrease in difficulty tolerating prolonged standing in order to improve ability to perform household chores.   Increased lumbar flexion and extension ROM to 70% for increased ease with daily activities  Pt will ambulate 500 feet with minimal to no gait deviations and least restrictive AD to increase tolerance for community ambulation   Pt will be independent in all bathroom ADLs to reduce caregiver burden and improve pt quality of life.   Pt will be independent in HEP and symptom management.     Plan     Continue with POC towards goals.     Paty Velez, PT

## 2020-07-13 NOTE — TELEPHONE ENCOUNTER
Reason for Disposition   Constant abdominal pain lasting > 2 hours    Additional Information   Negative: Passed out (i.e., fainted, collapsed and was not responding)   Negative: Shock suspected (e.g., cold/pale/clammy skin, too weak to stand, low BP, rapid pulse)   Negative: Sounds like a life-threatening emergency to the triager   Negative: Chest pain   Negative: Pain is mainly in upper abdomen (if needed ask: 'is it mainly above the belly button?')   Negative: Abdominal pain and pregnant > 20 weeks   Negative: Abdominal pain and pregnant < 20 weeks   Negative: SEVERE abdominal pain (e.g., excruciating)   Negative: Vomiting red blood or black (coffee ground) material   Negative: Bloody, black, or tarry bowel movements    Protocols used: ABDOMINAL PAIN - FEMALE-A-OH    Pt stated she has been complaining of abdominal pain for awhile to her PCP. Stated she has pain in her right lower abdomen and now the right side seems swollen or bigger than the left. She stated the pain is constant. Per triage protocol, advised to go to ED for evaluation. Pt refused and stated she would like Dr. Nassar to contact her and order an ultrasound so they can discuss it on her appointment Friday. Advised a message will be sent to the PCP's office to follow up, but the disposition remains go to the ED. Pt verbalized understanding and stated she will wait to hear from Dr. Nassar's office.

## 2020-07-15 ENCOUNTER — CLINICAL SUPPORT (OUTPATIENT)
Dept: REHABILITATION | Facility: HOSPITAL | Age: 55
End: 2020-07-15
Payer: COMMERCIAL

## 2020-07-15 DIAGNOSIS — M53.86 DECREASED RANGE OF MOTION OF LUMBAR SPINE: Primary | ICD-10-CM

## 2020-07-15 DIAGNOSIS — R29.898 DECREASED STRENGTH OF LOWER EXTREMITY: ICD-10-CM

## 2020-07-15 DIAGNOSIS — R26.89 DECREASED FUNCTIONAL MOBILITY: ICD-10-CM

## 2020-07-15 DIAGNOSIS — M25.652 DECREASED RANGE OF LEFT HIP MOVEMENT: ICD-10-CM

## 2020-07-15 DIAGNOSIS — R26.9 IMPAIRED GAIT: ICD-10-CM

## 2020-07-15 PROCEDURE — 97110 THERAPEUTIC EXERCISES: CPT | Mod: PN

## 2020-07-15 PROCEDURE — 97140 MANUAL THERAPY 1/> REGIONS: CPT | Mod: PN

## 2020-07-15 NOTE — PROGRESS NOTES
Physical Therapy Daily Treatment Note     Name: Janice Ramos  Clinic Number: [id number]    Therapy Diagnosis:   Encounter Diagnoses   Name Primary?    Decreased range of motion of lumbar spine Yes    Decreased range of left hip movement     Decreased functional mobility     Impaired gait     Decreased strength of lower extremity      Physician: Emily Nassar MD    Visit Date: 7/15/2020    Physician Orders: PT Eval and Treat Begin PWB 25% x 2 weeks, then 50% x 2 weeks, then WBAT x 2 weeks; eval and treat 3x/wk x 6 wks- From 3/10 Referral   Medical Diagnosis from Referral: S32.811A (ICD-10-CM) - Multiple fractures of pelvis with unstable disruption of pelvic ring, initial encounter for closed fracture Z98.890,Z87.81 (ICD-10-CM) - S/P ORIF (open reduction internal fixation) fracture   Evaluation Date: 5/21/2020  Authorization Period Expiration: 03/10/2021  Plan of Care Expiration: 08/21/2020  Visit # / Visits authorized: 16/ 20 (1 prior)    Time In: 2:25 PM  Time Out: 3:25 PM  Total Billable Time: 60 minutes    Precautions: Standard, s/p pelvic ORIF, DVT LLE    Subjective     Pt reports: she continues with pain to R abdomen but did not feel she needed to go to ER. She has a CT scan tomorrow and an appt with her primary care friday. Continues with limitations with squatting.   She was compliant with home exercise program.  Response to previous treatment: felt ok, not to much soreness   Functional change: able to swim    Pain: 3/10 in R abdomen, 0/10 to back, discomfort to L hip with dull symptoms   Location: left hip      Objective     Pt presented with superior elevation of R innominate in standing    Crystal received the following manual therapy techniques: Soft tissue Mobilization were applied to the: L ITB for 10 minutes.  Gentle STM to ITB of LLE  Vacuum/cupping STM with manual therapy techniques was performed to L ITB to decrease muscle tightness, increase circulation and promote healing process.   "The pt's skin was monitored for redness adjusting pressure as needed. The pt was instructed in possible side effects of bruising and/or soreness.      Janice received therapeutic exercises to develop strength, endurance, ROM, flexibility, posture and core stabilization for 50 minutes including:  MET R AR 3 x 3"    SL clams 2 x 10 LLE only   Seated Hip IR/ER YTB x 20 each LE  Lateral Walking in // bars Y Loop x 4 laps   Backwards Walking in // bars Y loop x 3 laps   Sit <> Stand 18" box Y loop at knees x 10   Sit <> stand 12" box with Airex Y loop at knees x 10   Shuttle 2.5 bands 3 x 10 BLE YTB at knees  Shuttle 1.5 band 2 x 10 Uni  YTB at knees  Pilates step L3 2 x 10 each LE  Semi Prone Hip Ext/Abd 2 x 10 Y loop   Recumbent Bike Seat 5 Level 3 x 5 min   Squat in // bars with Y loop around knees 2 x 10 cues for weight shift  Split squats x 10   Half kneel with shoulder flexion x 10    Not Performed:   Supine DKTC with ball x 2 min   Standing Hip Abd/Ext Y Loop 2 x 10 each   Shld ext pulldown BTB w TA set 2 x 10   Pallof Press YTB x 10 each     (next visit: stooping, kneeling, transfers)    Home Exercises Provided and Patient Education Provided     Education provided:   - regular performance of HEP  - performance of push pull regularly     Written Home Exercises Provided: Patient instructed to cont prior HEP.  Exercises were reviewed and Janice was able to demonstrate them prior to the end of the session.  Janice demonstrated good  understanding of the education provided.     See EMR under Patient Instructions for exercises provided 5/25/2020.    Assessment     Pt performed progressed half kneel exercises with required use of UE in parallel bars to get up from half kneel. Responded well to cupping to L ITB with noted decrease in tightness post cupping. She required assist with sit to stand from 12 in + foam pad with CGA to perform.         Janice is progressing well towards her goals.   Pt prognosis is Good. "     Pt will continue to benefit from skilled outpatient physical therapy to address the deficits listed in the problem list box on initial evaluation, provide pt/family education and to maximize pt's level of independence in the home and community environment.     Pt's spiritual, cultural and educational needs considered and pt agreeable to plan of care and goals.     Anticipated barriers to physical therapy: None     Goals:  Short Term Goals: 6 weeks (progressing, not met)  Pt will report decreased hip and LE pain to 6/10 at worst to increase tolerance for ADLs.   Pt will ambulate 200 feet with minimal to no gait deviations and least restrictive assistive device MET  Pt will increase L hip flexion by 10 degrees to increase tolerance for bed mobility and transfers. MET  Pt to tolerate HEP to improve ROM and independence with ADL's MET     Long Term Goals: 12 weeks (progressing, not met)  Pt will report > / = 30% decrease in difficulty tolerating prolonged standing in order to improve ability to perform household chores.   Increased lumbar flexion and extension ROM to 70% for increased ease with daily activities  Pt will ambulate 500 feet with minimal to no gait deviations and least restrictive AD to increase tolerance for community ambulation   Pt will be independent in all bathroom ADLs to reduce caregiver burden and improve pt quality of life.   Pt will be independent in HEP and symptom management.     Plan     Continue with POC towards goals. Andrew kneeling.     Justin Napoles, PT

## 2020-07-16 ENCOUNTER — CLINICAL SUPPORT (OUTPATIENT)
Dept: REHABILITATION | Facility: HOSPITAL | Age: 55
End: 2020-07-16
Payer: COMMERCIAL

## 2020-07-16 ENCOUNTER — HOSPITAL ENCOUNTER (OUTPATIENT)
Dept: RADIOLOGY | Facility: HOSPITAL | Age: 55
Discharge: HOME | End: 2020-07-16
Attending: FAMILY MEDICINE
Payer: COMMERCIAL

## 2020-07-16 DIAGNOSIS — R26.9 IMPAIRED GAIT: ICD-10-CM

## 2020-07-16 DIAGNOSIS — M53.86 DECREASED RANGE OF MOTION OF LUMBAR SPINE: Primary | ICD-10-CM

## 2020-07-16 DIAGNOSIS — K40.90 NON-RECURRENT UNILATERAL INGUINAL HERNIA WITHOUT OBSTRUCTION OR GANGRENE: ICD-10-CM

## 2020-07-16 DIAGNOSIS — M25.652 DECREASED RANGE OF LEFT HIP MOVEMENT: ICD-10-CM

## 2020-07-16 DIAGNOSIS — R26.89 DECREASED FUNCTIONAL MOBILITY: ICD-10-CM

## 2020-07-16 DIAGNOSIS — R10.32 LEFT LOWER QUADRANT PAIN: ICD-10-CM

## 2020-07-16 DIAGNOSIS — R29.898 DECREASED STRENGTH OF LOWER EXTREMITY: ICD-10-CM

## 2020-07-16 PROCEDURE — 97140 MANUAL THERAPY 1/> REGIONS: CPT | Mod: PN

## 2020-07-16 PROCEDURE — 74177 CT ABDOMEN PELVIS WITH CONTRAST: ICD-10-PCS | Mod: 26,,, | Performed by: RADIOLOGY

## 2020-07-16 PROCEDURE — A9698 NON-RAD CONTRAST MATERIALNOC: HCPCS | Mod: PO | Performed by: FAMILY MEDICINE

## 2020-07-16 PROCEDURE — 74177 CT ABD & PELVIS W/CONTRAST: CPT | Mod: 26,,, | Performed by: RADIOLOGY

## 2020-07-16 PROCEDURE — 74177 CT ABD & PELVIS W/CONTRAST: CPT | Mod: TC,PO

## 2020-07-16 PROCEDURE — 97110 THERAPEUTIC EXERCISES: CPT | Mod: PN

## 2020-07-16 PROCEDURE — 25500020 PHARM REV CODE 255: Mod: PO | Performed by: FAMILY MEDICINE

## 2020-07-16 RX ADMIN — IOHEXOL 75 ML: 350 INJECTION, SOLUTION INTRAVENOUS at 02:07

## 2020-07-16 RX ADMIN — IOHEXOL 1000 ML: 9 SOLUTION ORAL at 02:07

## 2020-07-16 NOTE — PROGRESS NOTES
Physical Therapy Daily Treatment Note     Name: Janice Ramos  Clinic Number: [id number]    Therapy Diagnosis:   Encounter Diagnoses   Name Primary?    Decreased range of motion of lumbar spine Yes    Decreased range of left hip movement     Decreased functional mobility     Impaired gait     Decreased strength of lower extremity      Physician: Emily Nassar MD    Visit Date: 7/16/2020    Physician Orders: PT Eval and Treat Begin PWB 25% x 2 weeks, then 50% x 2 weeks, then WBAT x 2 weeks; eval and treat 3x/wk x 6 wks- From 3/10 Referral   Medical Diagnosis from Referral: S32.811A (ICD-10-CM) - Multiple fractures of pelvis with unstable disruption of pelvic ring, initial encounter for closed fracture Z98.890,Z87.81 (ICD-10-CM) - S/P ORIF (open reduction internal fixation) fracture   Evaluation Date: 5/21/2020  Authorization Period Expiration: 03/10/2021  Plan of Care Expiration: 08/21/2020  Visit # / Visits authorized: 17/ 20 (1 prior)    Time In: 3:15 PM  Time Out: 4:15 PM  Total Billable Time: 60 minutes    Precautions: Standard, s/p pelvic ORIF, DVT LLE    Subjective     Pt reports: she had her CT scan prior to coming to therapy today. She feels ok after last visit with a little bit of soreness to L hip and R abdomen.   She was compliant with home exercise program.  Response to previous treatment: little bit of soreness   Functional change: able to swim    Pain: 4/10 in R abdomen pressure from CT liquid, 0/10 to back, discomfort to L hip with dull symptoms  Location: left hip      Objective     Pt presented with superior elevation of R innominate in standing    Janice received the following manual therapy techniques: Soft tissue Mobilization were applied to the: L ITB for 10 minutes.  MFR and STM to L ITB  Roller over L ITB  NP due to pt wearing pants after previous appt:  Vacuum/cupping STM with manual therapy techniques was performed to L ITB to decrease muscle tightness, increase circulation  "and promote healing process.  The pt's skin was monitored for redness adjusting pressure as needed. The pt was instructed in possible side effects of bruising and/or soreness.      Janice received therapeutic exercises to develop strength, endurance, ROM, flexibility, posture and core stabilization for 50 minutes including:  MET R AR 3 x 3"   Recumbent Bike Seat 5 Level 3.3 x 5 min   Lateral Walking in // bars Y Loop x 4 laps   Backwards Walking in // bars Y loop x 3 laps   Sit <> stand 12" box with airex x 10   Sit <> stand 12 inch box x 10   Shuttle 3 bands 3 x 10 BLE   Shuttle 2 band 2 x 10 Uni   Pilates step L3 2 x 10 each LE  Squat in // bars 2 x 10 cues for weight shift  Split squats x 10   Half kneel transition on airex foam to full kneel each leg  Full knee on airex foam with shoulder flexion x 10    Not Performed:   Semi Prone Hip Ext/Abd 2 x 10 Y loop   SL clams 2 x 10 LLE only   Seated Hip IR/ER YTB x 20 each LE  Supine DKTC with ball x 2 min   Standing Hip Abd/Ext Y Loop 2 x 10 each   Shld ext pulldown BTB w TA set 2 x 10   Pallof Press YTB x 10 each     (next visit: stooping, transfers)    Home Exercises Provided and Patient Education Provided     Education provided:   - regular performance of push pull   - split squat in limited range and near surface when  is home.     Written Home Exercises Provided: yes. Split squat.   Exercises were reviewed and Janice was able to demonstrate them prior to the end of the session.  Janice demonstrated good  understanding of the education provided.     See EMR under Patient Instructions for exercises provided 5/25/2020, 7/16/2020.    Assessment     Pt presents with improved tightness to L ITB post roller and STM/MFR. She progressed to full kneeling without increased pain, but noted pulling to B quads. She continues to require use of UE with transition from half kneel to stand. Able to demonstrated increased ease with sit to stand from 12 in stool. "     Janice is progressing well towards her goals.   Pt prognosis is Good.     Pt will continue to benefit from skilled outpatient physical therapy to address the deficits listed in the problem list box on initial evaluation, provide pt/family education and to maximize pt's level of independence in the home and community environment.     Pt's spiritual, cultural and educational needs considered and pt agreeable to plan of care and goals.     Anticipated barriers to physical therapy: None     Goals:  Short Term Goals: 6 weeks (progressing, not met)  Pt will report decreased hip and LE pain to 6/10 at worst to increase tolerance for ADLs.   Pt will ambulate 200 feet with minimal to no gait deviations and least restrictive assistive device MET  Pt will increase L hip flexion by 10 degrees to increase tolerance for bed mobility and transfers. MET  Pt to tolerate HEP to improve ROM and independence with ADL's MET     Long Term Goals: 12 weeks (progressing, not met)  Pt will report > / = 30% decrease in difficulty tolerating prolonged standing in order to improve ability to perform household chores.   Increased lumbar flexion and extension ROM to 70% for increased ease with daily activities  Pt will ambulate 500 feet with minimal to no gait deviations and least restrictive AD to increase tolerance for community ambulation   Pt will be independent in all bathroom ADLs to reduce caregiver burden and improve pt quality of life.   Pt will be independent in HEP and symptom management.     Plan     Continue with POC towards goals. One hand with half kneel to standing.     Justin Napoles, PT

## 2020-07-17 ENCOUNTER — OFFICE VISIT (OUTPATIENT)
Dept: FAMILY MEDICINE | Facility: CLINIC | Age: 55
End: 2020-07-17
Payer: COMMERCIAL

## 2020-07-17 VITALS
DIASTOLIC BLOOD PRESSURE: 80 MMHG | HEIGHT: 60 IN | TEMPERATURE: 98 F | OXYGEN SATURATION: 98 % | HEART RATE: 65 BPM | BODY MASS INDEX: 28.13 KG/M2 | SYSTOLIC BLOOD PRESSURE: 110 MMHG | WEIGHT: 143.31 LBS

## 2020-07-17 DIAGNOSIS — R10.813 RIGHT LOWER QUADRANT ABDOMINAL TENDERNESS WITHOUT REBOUND TENDERNESS: Primary | ICD-10-CM

## 2020-07-17 PROCEDURE — 99213 PR OFFICE/OUTPT VISIT, EST, LEVL III, 20-29 MIN: ICD-10-PCS | Mod: S$GLB,,, | Performed by: FAMILY MEDICINE

## 2020-07-17 PROCEDURE — 99999 PR PBB SHADOW E&M-EST. PATIENT-LVL IV: ICD-10-PCS | Mod: PBBFAC,,, | Performed by: FAMILY MEDICINE

## 2020-07-17 PROCEDURE — 3008F BODY MASS INDEX DOCD: CPT | Mod: CPTII,S$GLB,, | Performed by: FAMILY MEDICINE

## 2020-07-17 PROCEDURE — 99999 PR PBB SHADOW E&M-EST. PATIENT-LVL IV: CPT | Mod: PBBFAC,,, | Performed by: FAMILY MEDICINE

## 2020-07-17 PROCEDURE — 99213 OFFICE O/P EST LOW 20 MIN: CPT | Mod: S$GLB,,, | Performed by: FAMILY MEDICINE

## 2020-07-17 PROCEDURE — 3008F PR BODY MASS INDEX (BMI) DOCUMENTED: ICD-10-PCS | Mod: CPTII,S$GLB,, | Performed by: FAMILY MEDICINE

## 2020-07-17 NOTE — PROGRESS NOTES
"Subjective:       Patient ID: Janice Ramos is a 55 y.o. female.    Chief Complaint: Abdominal Pain (Ocurring x 2 weeks, no diarrhea or vomiting, pt c/o belching and feeling like her stomach is "tight' ) and Results (CT results )    HPI  Patient in clinic for f/u and review.   RUQ discomfort and fullness. We reviewed her CT abd without specific findings to contribute.   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 Not aware of any physical limitations to activities, and has been working PT 3 days/week.     Review of Systems:  Review of Systems   Constitutional: Negative for activity change, fatigue, fever and unexpected weight change.   HENT: Negative for congestion, postnasal drip (chronic), rhinorrhea and sinus pressure.         On zyrtec   Eyes: Negative for photophobia and visual disturbance.   Respiratory: Negative for apnea (neg psg in 3/2014), cough and shortness of breath.    Cardiovascular: Negative for chest pain and leg swelling (improved).   Gastrointestinal: Negative for abdominal pain, blood in stool, constipation and diarrhea.        No gerd c/o.   Genitourinary: Positive for frequency. Negative for difficulty urinating, dysuria and urgency.        Infrequent UTIs.   Musculoskeletal: Positive for arthralgias (a little sore after PT). Negative for gait problem (ambulatory without support) and myalgias (not taking too frequently).        Occ muscle relaxer, very sparing pain pill   Skin: Negative for color change and rash.   Neurological: Negative for dizziness (improved), weakness and headaches.   Psychiatric/Behavioral: Negative for confusion, dysphoric mood and sleep disturbance (light sleeper).       Objective:     Vitals:    07/17/20 1057   BP: 110/80   Pulse: 65   Temp: 97.7 °F (36.5 °C)   TempSrc: Temporal   SpO2: 98%   Weight: 65 kg (143 lb 4.8 oz)   Height: 5' (1.524 m)          Physical Exam  Vitals signs and nursing note reviewed.   Constitutional:       General: She is not in acute " distress.     Appearance: She is well-developed.   HENT:      Head: Normocephalic and atraumatic.   Eyes:      General: No scleral icterus.        Right eye: No discharge.         Left eye: No discharge.      Conjunctiva/sclera: Conjunctivae normal.   Neck:      Musculoskeletal: Normal range of motion and neck supple.   Cardiovascular:      Rate and Rhythm: Normal rate.   Pulmonary:      Effort: Pulmonary effort is normal. No respiratory distress.   Abdominal:      General: There is no distension.      Palpations: Abdomen is soft.   Musculoskeletal: Normal range of motion.   Skin:     General: Skin is warm and dry.      Findings: No rash.   Neurological:      Mental Status: She is alert and oriented to person, place, and time.   Psychiatric:         Behavior: Behavior normal.           Assessment & Plan:  Right lower quadrant abdominal tenderness without rebound tenderness    no concerning findings on exam.

## 2020-07-17 NOTE — LETTER
July 17, 2020    Janice Ramos  [address] [address]             [facility]  [address] [address]  Phone: [phone]  Fax: [phone]   July 17, 2020     Patient: Janice Ramos   YOB: 1965   Date of Visit: 7/17/2020       To Whom it May Concern:    Janice Ramos was seen in my clinic on 7/17/2020. She may return to work on 6 August 2020.    Please excuse her from any classes or work missed.    If you have any questions or concerns, please don't hesitate to call.    Sincerely,         Emily Nassar MD

## 2020-07-20 ENCOUNTER — CLINICAL SUPPORT (OUTPATIENT)
Dept: REHABILITATION | Facility: HOSPITAL | Age: 55
End: 2020-07-20
Payer: COMMERCIAL

## 2020-07-20 DIAGNOSIS — M53.86 DECREASED RANGE OF MOTION OF LUMBAR SPINE: Primary | ICD-10-CM

## 2020-07-20 DIAGNOSIS — R26.9 IMPAIRED GAIT: ICD-10-CM

## 2020-07-20 DIAGNOSIS — R29.898 DECREASED STRENGTH OF LOWER EXTREMITY: ICD-10-CM

## 2020-07-20 DIAGNOSIS — M25.652 DECREASED RANGE OF LEFT HIP MOVEMENT: ICD-10-CM

## 2020-07-20 DIAGNOSIS — R26.89 DECREASED FUNCTIONAL MOBILITY: ICD-10-CM

## 2020-07-20 PROCEDURE — 97140 MANUAL THERAPY 1/> REGIONS: CPT | Mod: PN

## 2020-07-20 PROCEDURE — 97110 THERAPEUTIC EXERCISES: CPT | Mod: PN

## 2020-07-20 NOTE — PROGRESS NOTES
Physical Therapy Daily Treatment Note     Name: Janice Ramos  Clinic Number: [id number]    Therapy Diagnosis:   Encounter Diagnoses   Name Primary?    Decreased range of motion of lumbar spine Yes    Decreased range of left hip movement     Decreased functional mobility     Impaired gait     Decreased strength of lower extremity      Physician: Emily Nassar MD    Visit Date: 7/20/2020    Physician Orders: PT Eval and Treat Begin PWB 25% x 2 weeks, then 50% x 2 weeks, then WBAT x 2 weeks; eval and treat 3x/wk x 6 wks- From 3/10 Referral   Medical Diagnosis from Referral: S32.811A (ICD-10-CM) - Multiple fractures of pelvis with unstable disruption of pelvic ring, initial encounter for closed fracture Z98.890,Z87.81 (ICD-10-CM) - S/P ORIF (open reduction internal fixation) fracture   Evaluation Date: 5/21/2020  Authorization Period Expiration: 03/10/2021  Plan of Care Expiration: 08/21/2020  Visit # / Visits authorized: 18/ 20 (1 prior)    Time In: 2:30 PM  Time Out: 3:25 PM  Total Billable Time: 55 minutes    Precautions: Standard, s/p pelvic ORIF, DVT LLE    Subjective     Pt reports: she is feeling pretty good today. Her CT results came back and did not show anything in relation to her R lower abdominal pain. She is wearing a compression  Brace recommended by her PCP with some relief. Performed push pull more regularly with some discomfort while performing, but relief after performing.    She was somewhat compliant with home exercise program. Performed Split squats once since last visit.   Response to previous treatment: little bit of soreness   Functional change: able to swim, able to stoop down to pick items off floor    Pain: 0/10   Location: left hip      Objective     Pt presented with superior elevation of R innominate in standing    Janice received the following manual therapy techniques: Soft tissue Mobilization were applied to the: L ITB for 15 minutes.  MFR and STM to L  "ITB  Vacuum/cupping STM with manual therapy techniques was performed to L ITB to decrease muscle tightness, increase circulation and promote healing process.  The pt's skin was monitored for redness adjusting pressure as needed. The pt was instructed in possible side effects of bruising and/or soreness.      Janice received therapeutic exercises to develop strength, endurance, ROM, flexibility, posture and core stabilization for 40 minutes including:  MET R AR 3 x 3"   Recumbent Bike Seat 5 Level 3.3 x 5 min   Lateral Walking in // bars Y Loop x 4 laps   Backwards Walking in // bars Y loop x 3 laps   Sit <> stand 12 inch box x 10   Pilates step L3 2 x 10 each LE  Squat in // bars 2 x 10 cues for weight shift  Split squats x 10   Half kneel transition on airex foam to full kneel each leg  Full knee on airex foam with shoulder flexion x 10  Full kneel on oval thera band foam with ball toss x 20  Semi Prone Hip Ext/Abd 2 x 10 Y loop     Not Performed:   Shuttle 3 bands 3 x 10 BLE   Shuttle 2 band 2 x 10 Uni   SL clams 2 x 10 LLE only   Seated Hip IR/ER YTB x 20 each LE  Supine DKTC with ball x 2 min   Standing Hip Abd/Ext Y Loop 2 x 10 each   Shld ext pulldown BTB w TA set 2 x 10   Pallof Press YTB x 10 each     (next visit: stooping to move cones from one place to the other, transfers)    Home Exercises Provided and Patient Education Provided     Education provided:   - regular performance of push pull   - regular performance of split squat in limited range and near surface when  is home.     Written Home Exercises Provided: Patient instructed to cont prior HEP.   Exercises were reviewed and Janice was able to demonstrate them prior to the end of the session.  Janice demonstrated good  understanding of the education provided.     See EMR under Patient Instructions for exercises provided 5/25/2020, 7/16/2020.    Assessment     Pt demonstrates continued increase in ease with transfer sit to stand from 12 in " step. She demonstrates ability to perform half kneel to stand with use of one UE on some trials. Progressed with dynamic kneeling without increased pain to thighs.     Janice is progressing well towards her goals.   Pt prognosis is Good.     Pt will continue to benefit from skilled outpatient physical therapy to address the deficits listed in the problem list box on initial evaluation, provide pt/family education and to maximize pt's level of independence in the home and community environment.     Pt's spiritual, cultural and educational needs considered and pt agreeable to plan of care and goals.     Anticipated barriers to physical therapy: None     Goals:  Short Term Goals: 6 weeks (progressing, not met)  Pt will report decreased hip and LE pain to 6/10 at worst to increase tolerance for ADLs.   Pt will ambulate 200 feet with minimal to no gait deviations and least restrictive assistive device MET  Pt will increase L hip flexion by 10 degrees to increase tolerance for bed mobility and transfers. MET  Pt to tolerate HEP to improve ROM and independence with ADL's MET     Long Term Goals: 12 weeks (progressing, not met)  Pt will report > / = 30% decrease in difficulty tolerating prolonged standing in order to improve ability to perform household chores.   Increased lumbar flexion and extension ROM to 70% for increased ease with daily activities  Pt will ambulate 500 feet with minimal to no gait deviations and least restrictive AD to increase tolerance for community ambulation   Pt will be independent in all bathroom ADLs to reduce caregiver burden and improve pt quality of life.   Pt will be independent in HEP and symptom management.     Plan     Continue with POC towards goals. Continue with one hand with half kneel to standing. Practice stooping to move cones from one place to the other    Justin Napoles, PT

## 2020-07-23 ENCOUNTER — CLINICAL SUPPORT (OUTPATIENT)
Dept: REHABILITATION | Facility: HOSPITAL | Age: 55
End: 2020-07-23
Payer: COMMERCIAL

## 2020-07-23 DIAGNOSIS — M25.652 DECREASED RANGE OF LEFT HIP MOVEMENT: ICD-10-CM

## 2020-07-23 DIAGNOSIS — M53.86 DECREASED RANGE OF MOTION OF LUMBAR SPINE: Primary | ICD-10-CM

## 2020-07-23 DIAGNOSIS — R29.898 DECREASED STRENGTH OF LOWER EXTREMITY: ICD-10-CM

## 2020-07-23 DIAGNOSIS — R26.89 DECREASED FUNCTIONAL MOBILITY: ICD-10-CM

## 2020-07-23 DIAGNOSIS — R26.9 IMPAIRED GAIT: ICD-10-CM

## 2020-07-23 PROCEDURE — 97110 THERAPEUTIC EXERCISES: CPT | Mod: PN

## 2020-07-23 PROCEDURE — 97140 MANUAL THERAPY 1/> REGIONS: CPT | Mod: PN

## 2020-07-23 NOTE — PROGRESS NOTES
Physical Therapy Daily Treatment Note     Name: Janice Ramos  Clinic Number: [id number]    Therapy Diagnosis:   Encounter Diagnoses   Name Primary?    Decreased range of motion of lumbar spine Yes    Decreased range of left hip movement     Decreased functional mobility     Impaired gait     Decreased strength of lower extremity      Physician: Emily Nassar MD    Visit Date: 7/23/2020    Physician Orders: PT Eval and Treat Begin PWB 25% x 2 weeks, then 50% x 2 weeks, then WBAT x 2 weeks; eval and treat 3x/wk x 6 wks- From 3/10 Referral   Medical Diagnosis from Referral: S32.811A (ICD-10-CM) - Multiple fractures of pelvis with unstable disruption of pelvic ring, initial encounter for closed fracture Z98.890,Z87.81 (ICD-10-CM) - S/P ORIF (open reduction internal fixation) fracture   Evaluation Date: 5/21/2020  Authorization Period Expiration: 03/10/2021  Plan of Care Expiration: 08/21/2020  Visit # / Visits authorized: 19/ 20 (1 prior)    Time In: 2:35 PM  Time Out: 3:25 PM  Total Billable Time: 50 minutes    Precautions: Standard, s/p pelvic ORIF, DVT LLE    Subjective     Pt reports: she continues with tenderness and pain to her L lateral thigh. Overall, continues to feel stronger.   She was compliant with home exercise program.    Response to previous treatment: little bit of soreness   Functional change: able to swim, able to stoop down to pick items off floor    Pain: 0/10   Location: left hip      Objective     Janiec received the following manual therapy techniques: Soft tissue Mobilization were applied to the: L ITB for 10 minutes.  MFR and STM to L ITB  NP: Vacuum/cupping STM with manual therapy techniques was performed to L ITB to decrease muscle tightness, increase circulation and promote healing process.  The pt's skin was monitored for redness adjusting pressure as needed. The pt was instructed in possible side effects of bruising and/or soreness.      Janice received therapeutic  "exercises to develop strength, endurance, ROM, flexibility, posture and core stabilization for 40 minutes including:  Bridge with yellow loop x 30  Shuttle 3 bands 2 x 15 BLE with yellow loop   Shuttle 2 band 2 x 15 Uni with yellow loop  Pilates step one spring L3 one L4; 2 x 10 each LE  Sit <> stand 12 inch box with yellow loop x 10   Squat in // bars with yellow loop 2 x 10 cues for weight shift  Lateral Walking in // bars Y Loop x 4 laps   Backwards Walking in // bars Y loop x 3 laps   Split squats without hand holds and minimal tap for balance x 6 on L and 9 on R   Recumbent bike seat 2 level 3 x 5 minutes for endurance building    NP:  Half kneel transition on airex foam to full kneel each leg  Full knee on airex foam with shoulder flexion x 10  Full kneel on oval thera band foam with ball toss x 20  Semi Prone Hip Ext/Abd 2 x 10 Y loop   MET R AR 3 x 3"   SL clams 2 x 10 LLE only   Seated Hip IR/ER YTB x 20 each LE  Supine DKTC with ball x 2 min   Standing Hip Abd/Ext Y Loop 2 x 10 each   Shld ext pulldown BTB w TA set 2 x 10   Pallof Press YTB x 10 each     Home Exercises Provided and Patient Education Provided     Education provided:   - continue with regular performance of HEP including split squats.      Written Home Exercises Provided: Patient instructed to cont prior HEP.   Exercises were reviewed and Janice was able to demonstrate them prior to the end of the session.  Janice demonstrated good  understanding of the education provided.     See EMR under Patient Instructions for exercises provided 5/25/2020, 7/16/2020.    Assessment     Pt presents with improved ability to perform split squats without use of UE consistently, only use UE very minimal for balance. She was progressed with repetitions noted above in bold. She required cues for increased ZAMZAM with repetitive stooping to  cones.   Janice is progressing well towards her goals.   Pt prognosis is Good.     Pt will continue to benefit from " skilled outpatient physical therapy to address the deficits listed in the problem list box on initial evaluation, provide pt/family education and to maximize pt's level of independence in the home and community environment.     Pt's spiritual, cultural and educational needs considered and pt agreeable to plan of care and goals.     Anticipated barriers to physical therapy: None     Goals:  Short Term Goals: 6 weeks (progressing, not met)  Pt will report decreased hip and LE pain to 6/10 at worst to increase tolerance for ADLs.   Pt will ambulate 200 feet with minimal to no gait deviations and least restrictive assistive device MET  Pt will increase L hip flexion by 10 degrees to increase tolerance for bed mobility and transfers. MET  Pt to tolerate HEP to improve ROM and independence with ADL's MET     Long Term Goals: 12 weeks (progressing, not met)  Pt will report > / = 30% decrease in difficulty tolerating prolonged standing in order to improve ability to perform household chores.   Increased lumbar flexion and extension ROM to 70% for increased ease with daily activities  Pt will ambulate 500 feet with minimal to no gait deviations and least restrictive AD to increase tolerance for community ambulation   Pt will be independent in all bathroom ADLs to reduce caregiver burden and improve pt quality of life.   Pt will be independent in HEP and symptom management.     Plan     Continue with POC towards goals. Resume half kneel and kneeling exercises.    Justin Napoles, PT

## 2020-07-27 ENCOUNTER — CLINICAL SUPPORT (OUTPATIENT)
Dept: REHABILITATION | Facility: HOSPITAL | Age: 55
End: 2020-07-27
Payer: COMMERCIAL

## 2020-07-27 DIAGNOSIS — R26.89 DECREASED FUNCTIONAL MOBILITY: ICD-10-CM

## 2020-07-27 DIAGNOSIS — M53.86 DECREASED RANGE OF MOTION OF LUMBAR SPINE: ICD-10-CM

## 2020-07-27 DIAGNOSIS — R29.898 DECREASED STRENGTH OF LOWER EXTREMITY: ICD-10-CM

## 2020-07-27 DIAGNOSIS — M25.652 DECREASED RANGE OF LEFT HIP MOVEMENT: ICD-10-CM

## 2020-07-27 DIAGNOSIS — R26.9 IMPAIRED GAIT: ICD-10-CM

## 2020-07-27 PROCEDURE — 97110 THERAPEUTIC EXERCISES: CPT | Mod: PN

## 2020-07-27 NOTE — PROGRESS NOTES
Physical Therapy Daily Treatment Note     Name: Janice Ramos  Clinic Number: [id number]    Therapy Diagnosis:   Encounter Diagnoses   Name Primary?    Decreased range of motion of lumbar spine     Decreased range of left hip movement     Decreased functional mobility     Impaired gait     Decreased strength of lower extremity      Physician: Emily Nassar MD    Visit Date: 7/27/2020    Physician Orders: PT Eval and Treat Begin PWB 25% x 2 weeks, then 50% x 2 weeks, then WBAT x 2 weeks; eval and treat 3x/wk x 6 wks- From 3/10 Referral   Medical Diagnosis from Referral: S32.811A (ICD-10-CM) - Multiple fractures of pelvis with unstable disruption of pelvic ring, initial encounter for closed fracture Z98.890,Z87.81 (ICD-10-CM) - S/P ORIF (open reduction internal fixation) fracture   Evaluation Date: 5/21/2020  Authorization Period Expiration: 03/10/2021  Plan of Care Expiration: 08/21/2020  Visit # / Visits authorized: 20/ 20 (1 prior)    Time In: 2:35 PM  Time Out: 4:15 PM  Total Billable Time: 50 minutes    Precautions: Standard, s/p pelvic ORIF, DVT LLE    Subjective     Pt reports: she is doing better. Not sure when she is going back to school yet. Pt states she continues to have difficulty with kneeling. Overall doing much better.   She was compliant with home exercise program.    Response to previous treatment: little bit of soreness   Functional change: able to swim, able to stoop down to pick items off floor    Pain: 2/10, Worst 4/10   Location: left hip      Objective        Posture Alignment : In standing, pt with significant forward head, rounded shoulders, forward trunk flexion      Movement Analysis: Pt requires additional time and effort to complete bed mobility, unable to lie on L side.         GAIT DEVIATIONS: Janice displays decreased jessi, decreased step length LLE.      ROM:     AROM ROM (% of normal) Comment Normal   Flexion: 100%  60 deg   Extension: 50%  25 deg   Lateral Flex R:  50%   25 deg   Lateral Flex L: 75%  25 deg   Rotation R: 20%   18 deg   Rotation L: 20%   18 deg   *denotes pain        PROM Right Left Comment   Hip Flexion: 110 degrees 110 degrees     Hip ABD: WNL degrees WNL degrees     Hip ER, 90°  flex: 46 degrees 45 degrees     Hip IR, 90°  flex: 30 degrees 32 degrees     *pain     Strength:          Right Left Comment   Hip Flexion: 5/5 4+/5     Knee Extension: 5/5 5/5     Knee Flexion: 5/5 5/5     Ankle DF: 5/5 5/5     Hip Abd    4+/5   4+/5      Hip extension MMT unable to be assessed secondary to pt intolerance for position     Special Tests:     Neurovascular:  - Sensation: Grossly intact to light touch across extremities, tightness and discomfort in LLE compared to right  - Balance: Pt able to perform SLS on LLE x 20 sec with min UE support      Palpation:  Pt with atrophy of L glut. Continued edema to L ankle.      Joint Play:  Not assessed      Janice received the following manual therapy techniques: Soft tissue Mobilization were applied to the: L ITB for 0 minutes.  MFR and STM to L ITB  NP: Vacuum/cupping STM with manual therapy techniques was performed to L ITB to decrease muscle tightness, increase circulation and promote healing process.  The pt's skin was monitored for redness adjusting pressure as needed. The pt was instructed in possible side effects of bruising and/or soreness.      Janice received therapeutic exercises to develop strength, endurance, ROM, flexibility, posture and core stabilization for 50 minutes including:  Bridge with yellow loop x 30  Shuttle 3 bands 2 x 15 BLE with yellow loop   Shuttle 2 band 2 x 15 Uni with yellow loop  Pilates step one spring L3 one L4; 2 x 10 each LE  Sit <> stand 12 inch box with yellow loop x 10   Squat in // bars with yellow loop 2 x 10 cues for weight shift  Lateral Walking in // bars Y Loop x 4 laps   Backwards Walking in // bars Y loop x 3 laps   Split squats without hand holds and minimal tap for balance 2 x  "5 each LE  Shuttle Extension kickback red band x 10 each LE  Recumbent bike seat 2 level 3 x 5 minutes for endurance building    (next visit: half kneeling exercises, single leg bridge, single leg sit to stand)    NP:  Half kneel transition on airex foam to full kneel each leg  Full knee on airex foam with shoulder flexion x 10  Full kneel on oval thera band foam with ball toss x 20  Semi Prone Hip Ext/Abd 2 x 10 Y loop   MET R AR 3 x 3"   SL clams 2 x 10 LLE only   Seated Hip IR/ER YTB x 20 each LE  Supine DKTC with ball x 2 min   Standing Hip Abd/Ext Y Loop 2 x 10 each   Shld ext pulldown BTB w TA set 2 x 10   Pallof Press YTB x 10 each     Home Exercises Provided and Patient Education Provided     Education provided:   - continue with regular performance of HEP including split squats.      Written Home Exercises Provided: Patient instructed to cont prior HEP.   Exercises were reviewed and Janice was able to demonstrate them prior to the end of the session.  Janice demonstrated good  understanding of the education provided.     See EMR under Patient Instructions for exercises provided 5/25/2020, 7/16/2020.    Assessment     Pt demonstrates considerable improvements in BLE strength and lumbar AROM since prior assessment. Pt continues to be limited in lumbar mobility; however, is no longer experiencing pain with end range motion. Pt demonstrated improved stability with split squat today, able to perform without UE support. However, pt with lateral pelvic tilt and knee valgus, requiring verbal cues for correction. Pt able to progress posterior chain strengthening today with shuttle kick back. Pt with increased challenged on LLE due to continued glut weakness. Will continue to progress as tolerated. Pt will continue to benefit from skilled PT services to address gait impairments, decreased LE strength, and impaired functional mobility; so she may maximize independence in ADLs and return to PLOF.     Janice is " progressing well towards her goals.   Pt prognosis is Good.     Pt will continue to benefit from skilled outpatient physical therapy to address the deficits listed in the problem list box on initial evaluation, provide pt/family education and to maximize pt's level of independence in the home and community environment.     Pt's spiritual, cultural and educational needs considered and pt agreeable to plan of care and goals.     Anticipated barriers to physical therapy: None     Goals:  Short Term Goals: 6 weeks (progressing, not met)  Pt will report decreased hip and LE pain to 6/10 at worst to increase tolerance for ADLs. MET  Pt will ambulate 200 feet with minimal to no gait deviations and least restrictive assistive device MET  Pt will increase L hip flexion by 10 degrees to increase tolerance for bed mobility and transfers. MET  Pt to tolerate HEP to improve ROM and independence with ADL's MET     Long Term Goals: 12 weeks (progressing, not met)  Pt will report > / = 30% decrease in difficulty tolerating prolonged standing in order to improve ability to perform household chores. MET  Increased lumbar flexion and extension ROM to 70% for increased ease with daily activities   Pt will ambulate 500 feet with minimal to no gait deviations and least restrictive AD to increase tolerance for community ambulation   Pt will be independent in all bathroom ADLs to reduce caregiver burden and improve pt quality of life. MET  Pt will be independent in HEP and symptom management.     Plan     Continue with POC towards goals. Resume half kneel and kneeling exercises.    Paty Velez, PT

## 2020-07-29 ENCOUNTER — CLINICAL SUPPORT (OUTPATIENT)
Dept: REHABILITATION | Facility: HOSPITAL | Age: 55
End: 2020-07-29
Payer: COMMERCIAL

## 2020-07-29 DIAGNOSIS — M53.86 DECREASED RANGE OF MOTION OF LUMBAR SPINE: ICD-10-CM

## 2020-07-29 DIAGNOSIS — M25.652 DECREASED RANGE OF LEFT HIP MOVEMENT: ICD-10-CM

## 2020-07-29 DIAGNOSIS — R26.89 DECREASED FUNCTIONAL MOBILITY: ICD-10-CM

## 2020-07-29 DIAGNOSIS — R26.9 IMPAIRED GAIT: ICD-10-CM

## 2020-07-29 DIAGNOSIS — R29.898 DECREASED STRENGTH OF LOWER EXTREMITY: ICD-10-CM

## 2020-07-29 PROCEDURE — 97110 THERAPEUTIC EXERCISES: CPT | Mod: PN

## 2020-07-29 NOTE — PROGRESS NOTES
Physical Therapy Daily Treatment Note     Name: Janice Ramos  Clinic Number: [id number]    Therapy Diagnosis:   Encounter Diagnoses   Name Primary?    Decreased range of motion of lumbar spine     Decreased range of left hip movement     Decreased functional mobility     Impaired gait     Decreased strength of lower extremity      Physician: Emily Nassar MD    Visit Date: 7/31/2020    Physician Orders: PT Eval and Treat Begin PWB 25% x 2 weeks, then 50% x 2 weeks, then WBAT x 2 weeks; eval and treat 3x/wk x 6 wks- From 3/10 Referral   Medical Diagnosis from Referral: S32.811A (ICD-10-CM) - Multiple fractures of pelvis with unstable disruption of pelvic ring, initial encounter for closed fracture Z98.890,Z87.81 (ICD-10-CM) - S/P ORIF (open reduction internal fixation) fracture   Evaluation Date: 5/21/2020  Authorization Period Expiration: 03/10/2021  Plan of Care Expiration: 08/21/2020  Visit # / Visits authorized: 2/12  (21 prior)    Time In: 2:05 PM  Time Out: 2:55 PM  Total Billable Time: 50 minutes    Precautions: Standard, s/p pelvic ORIF, DVT LLE    Subjective     Pt reports: she is doing good today, had to take medication last night to help her sleep because she continues to have discomfort at night. 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   She was compliant with home exercise program.    Response to previous treatment: little bit of soreness   Functional change: able to swim, able to stoop down to pick items off floor    Pain: 2/10, Worst 4/10   Location: left hip      Objective     Janice received the following manual therapy techniques: Soft tissue Mobilization were applied to the: LLE for 5 minutes.  Long [place] Distraction LLE  MFR and STM to L ITB  NP: Vacuum/cupping STM with manual therapy techniques was performed to L ITB to decrease muscle tightness, increase circulation and promote healing process.  The pt's skin was monitored for redness  "adjusting pressure as needed. The pt was instructed in possible side effects of bruising and/or soreness.      Janice received therapeutic exercises to develop strength, endurance, ROM, flexibility, posture and core stabilization for 45 minutes including:  Bridge with yellow loop x 30  SL Bridge x 10 each LE  Shuttle 3 bands 2 x 15 BLE with yellow loop   Shuttle Extension kickback red band x 10 each LE  Lateral Step Down at stairs 2 x 5 each LE   Forward step down at stairs x 10 LLE   Lateral Walking in cross walk Y loop x 1 lap   SLS with ball toss green ball x 10 each LE  Prone Quad Stretch 3 x 20" BLE  Recumbent bike seat 2 level 3 x 5 minutes for endurance building    NP:  Single Leg Sit to stand x 10 each LE 18" box with foam   Split squats over ground minimal tap for balance 2 x 5 each LE  SL Quad Stretch/Passive Hip Ext with PT assist 3 x 20"   (NP today) Shuttle 2 band 2 x 15 Uni with yellow loop  (NP today) Pilates step one spring L3 one L4; 2 x 10 each LE  (NP today) Sit <> stand 12 inch box with yellow loop x 10   (NP today) Squat in // bars with yellow loop 2 x 10 cues for weight shift  (NP today)Backwards Walking in // bars Y loop x 3 laps   Half kneel transition on airex foam to full kneel each leg  Full knee on airex foam with shoulder flexion x 10  Full kneel on oval thera band foam with ball toss x 20  Semi Prone Hip Ext/Abd 2 x 10 Y loop       Home Exercises Provided and Patient Education Provided     Education provided:   - continue with regular performance of HEP including split squats.      Written Home Exercises Provided: Patient instructed to cont prior HEP.   Exercises were reviewed and Janice was able to demonstrate them prior to the end of the session.  Janice demonstrated good  understanding of the education provided.     See EMR under Patient Instructions for exercises provided 5/25/2020, 7/16/2020.    Assessment     Pt displayed improvements in mechanics of lateral and forward step " downs without cueing today. Pt continues to demonstrate increased myofascial tightness along anterior aspect of L hip, as well as through L ITB. Pt may benefit from return to manual techniques and cupping to improve myofascial mobility to allow for appropriate muscle mobility. Pt able to perform all therex today without symptom provocation. Pt displayed improved glut activation and control with shuttle kickback. Will continue to progress as tolerated.     Janice is progressing well towards her goals.   Pt prognosis is Good.     Pt will continue to benefit from skilled outpatient physical therapy to address the deficits listed in the problem list box on initial evaluation, provide pt/family education and to maximize pt's level of independence in the home and community environment.     Pt's spiritual, cultural and educational needs considered and pt agreeable to plan of care and goals.     Anticipated barriers to physical therapy: None     Goals:  Short Term Goals: 6 weeks (progressing, not met)  Pt will report decreased hip and LE pain to 6/10 at worst to increase tolerance for ADLs. MET  Pt will ambulate 200 feet with minimal to no gait deviations and least restrictive assistive device MET  Pt will increase L hip flexion by 10 degrees to increase tolerance for bed mobility and transfers. MET  Pt to tolerate HEP to improve ROM and independence with ADL's MET     Long Term Goals: 12 weeks (progressing, not met)  Pt will report > / = 30% decrease in difficulty tolerating prolonged standing in order to improve ability to perform household chores. MET  Increased lumbar flexion and extension ROM to 70% for increased ease with daily activities   Pt will ambulate 500 feet with minimal to no gait deviations and least restrictive AD to increase tolerance for community ambulation   Pt will be independent in all bathroom ADLs to reduce caregiver burden and improve pt quality of life. MET  Pt will be independent in HEP and  symptom management.     Plan     Continue with POC towards goals. Quad stretch/hip flexor stretch     Paty Velez, PT

## 2020-07-29 NOTE — PROGRESS NOTES
Physical Therapy Daily Treatment Note     Name: Janice Ramos  Clinic Number: [id number]    Therapy Diagnosis:   Encounter Diagnoses   Name Primary?    Decreased range of motion of lumbar spine     Decreased range of left hip movement     Decreased functional mobility     Impaired gait     Decreased strength of lower extremity      Physician: Emily Nassar MD    Visit Date: 7/29/2020    Physician Orders: PT Eval and Treat Begin PWB 25% x 2 weeks, then 50% x 2 weeks, then WBAT x 2 weeks; eval and treat 3x/wk x 6 wks- From 3/10 Referral   Medical Diagnosis from Referral: S32.811A (ICD-10-CM) - Multiple fractures of pelvis with unstable disruption of pelvic ring, initial encounter for closed fracture Z98.890,Z87.81 (ICD-10-CM) - S/P ORIF (open reduction internal fixation) fracture   Evaluation Date: 5/21/2020  Authorization Period Expiration: 03/10/2021  Plan of Care Expiration: 08/21/2020  Visit # / Visits authorized: 1/12  (21 prior)    Time In: 2:40 PM  Time Out: 3:35 PM  Total Billable Time: 55 minutes    Precautions: Standard, s/p pelvic ORIF, DVT LLE    Subjective     Pt reports: she is doing okay today, had some nerve pain in the RLE last night.   She was compliant with home exercise program.    Response to previous treatment: little bit of soreness   Functional change: able to swim, able to stoop down to pick items off floor    Pain: 3/10, Worst 4/10   Location: left hip      Objective     Janice received the following manual therapy techniques: Soft tissue Mobilization were applied to the: LLE for 2 minutes.  Long [place] Distraction LLE  NP: MFR and STM to L ITB  NP: Vacuum/cupping STM with manual therapy techniques was performed to L ITB to decrease muscle tightness, increase circulation and promote healing process.  The pt's skin was monitored for redness adjusting pressure as needed. The pt was instructed in possible side effects of bruising and/or soreness.      Janice received therapeutic  "exercises to develop strength, endurance, ROM, flexibility, posture and core stabilization for 53 minutes including:  Bridge with yellow loop x 30  SL Bridge x 10 each LE  Single Leg Sit to stand x 10 each LE 18" box with foam   Split squats over ground minimal tap for balance 2 x 5 each LE  SL Quad Stretch/Passive Hip Ext with PT assist 3 x 20"   Lateral Step Down at stairs 2 x 5 each LE   Forward step down at stairs x 10 LLE   Shuttle 3 bands 2 x 15 BLE with yellow loop   Lateral Walking in cross walk Y loop x 1 lap   Shuttle Extension kickback red band x 10 each LE  Recumbent bike seat 2 level 3 x 5 minutes for endurance building    (NP today) Shuttle 2 band 2 x 15 Uni with yellow loop  (NP today) Pilates step one spring L3 one L4; 2 x 10 each LE  (NP today) Sit <> stand 12 inch box with yellow loop x 10   (NP today) Squat in // bars with yellow loop 2 x 10 cues for weight shift  (NP today)Backwards Walking in // bars Y loop x 3 laps     (next visit: quad stretch/hip flexor stretch)    NP:  Half kneel transition on airex foam to full kneel each leg  Full knee on airex foam with shoulder flexion x 10  Full kneel on oval thera band foam with ball toss x 20  Semi Prone Hip Ext/Abd 2 x 10 Y loop   MET R AR 3 x 3"   SL clams 2 x 10 LLE only   Seated Hip IR/ER YTB x 20 each LE  Supine DKTC with ball x 2 min   Standing Hip Abd/Ext Y Loop 2 x 10 each   Shld ext pulldown BTB w TA set 2 x 10   Pallof Press YTB x 10 each     Home Exercises Provided and Patient Education Provided     Education provided:   - continue with regular performance of HEP including split squats.      Written Home Exercises Provided: Patient instructed to cont prior HEP.   Exercises were reviewed and Janice was able to demonstrate them prior to the end of the session.  Janice demonstrated good  understanding of the education provided.     See EMR under Patient Instructions for exercises provided 5/25/2020, 7/16/2020.    Assessment     Pt " demonstrated good tolerance for treatment session today, able to progress LLE glut and quad strengthening today. Pt initially with anterior hip pain of LLE during lunge with LLE posterior. Exercise terminated, and long axis distraction performed along with sidelying quad stretch. Symptoms improved following passive and manual techniques, and pt was able to perform split squat without pain. Lateral and forward step downs added today to address eccentric quad weakness. Pt required min cues for valgus of L knee with lowering. Pt able to perform single leg bridge today on LLE with good glut clearance. Pt will continue to benefit from glut med, glut max, and quadriceps strengthening.     Janice is progressing well towards her goals.   Pt prognosis is Good.     Pt will continue to benefit from skilled outpatient physical therapy to address the deficits listed in the problem list box on initial evaluation, provide pt/family education and to maximize pt's level of independence in the home and community environment.     Pt's spiritual, cultural and educational needs considered and pt agreeable to plan of care and goals.     Anticipated barriers to physical therapy: None     Goals:  Short Term Goals: 6 weeks (progressing, not met)  Pt will report decreased hip and LE pain to 6/10 at worst to increase tolerance for ADLs. MET  Pt will ambulate 200 feet with minimal to no gait deviations and least restrictive assistive device MET  Pt will increase L hip flexion by 10 degrees to increase tolerance for bed mobility and transfers. MET  Pt to tolerate HEP to improve ROM and independence with ADL's MET     Long Term Goals: 12 weeks (progressing, not met)  Pt will report > / = 30% decrease in difficulty tolerating prolonged standing in order to improve ability to perform household chores. MET  Increased lumbar flexion and extension ROM to 70% for increased ease with daily activities   Pt will ambulate 500 feet with minimal to no gait  deviations and least restrictive AD to increase tolerance for community ambulation   Pt will be independent in all bathroom ADLs to reduce caregiver burden and improve pt quality of life. MET  Pt will be independent in HEP and symptom management.     Plan     Continue with POC towards goals. Quad stretch/hip flexor stretch     Paty Velez, PT

## 2020-07-31 ENCOUNTER — CLINICAL SUPPORT (OUTPATIENT)
Dept: REHABILITATION | Facility: HOSPITAL | Age: 55
End: 2020-07-31
Payer: COMMERCIAL

## 2020-07-31 DIAGNOSIS — R29.898 DECREASED STRENGTH OF LOWER EXTREMITY: ICD-10-CM

## 2020-07-31 DIAGNOSIS — M53.86 DECREASED RANGE OF MOTION OF LUMBAR SPINE: ICD-10-CM

## 2020-07-31 DIAGNOSIS — R26.9 IMPAIRED GAIT: ICD-10-CM

## 2020-07-31 DIAGNOSIS — R26.89 DECREASED FUNCTIONAL MOBILITY: ICD-10-CM

## 2020-07-31 DIAGNOSIS — M25.652 DECREASED RANGE OF LEFT HIP MOVEMENT: ICD-10-CM

## 2020-07-31 PROCEDURE — 97110 THERAPEUTIC EXERCISES: CPT | Mod: PN

## 2020-08-03 ENCOUNTER — CLINICAL SUPPORT (OUTPATIENT)
Dept: REHABILITATION | Facility: HOSPITAL | Age: 55
End: 2020-08-03
Payer: COMMERCIAL

## 2020-08-03 DIAGNOSIS — R26.9 IMPAIRED GAIT: ICD-10-CM

## 2020-08-03 DIAGNOSIS — M25.652 DECREASED RANGE OF LEFT HIP MOVEMENT: ICD-10-CM

## 2020-08-03 DIAGNOSIS — R29.898 DECREASED STRENGTH OF LOWER EXTREMITY: ICD-10-CM

## 2020-08-03 DIAGNOSIS — R26.89 DECREASED FUNCTIONAL MOBILITY: ICD-10-CM

## 2020-08-03 DIAGNOSIS — M53.86 DECREASED RANGE OF MOTION OF LUMBAR SPINE: ICD-10-CM

## 2020-08-03 PROCEDURE — 97110 THERAPEUTIC EXERCISES: CPT | Mod: PN

## 2020-08-03 NOTE — PROGRESS NOTES
Physical Therapy Daily Treatment Note     Name: Janice Ramos  Clinic Number: [id number]    Therapy Diagnosis:   Encounter Diagnoses   Name Primary?    Decreased range of motion of lumbar spine     Decreased range of left hip movement     Decreased functional mobility     Impaired gait     Decreased strength of lower extremity      Physician: Emily Nassar MD    Visit Date: 8/3/2020    Physician Orders: PT Eval and Treat Begin PWB 25% x 2 weeks, then 50% x 2 weeks, then WBAT x 2 weeks; eval and treat 3x/wk x 6 wks- From 3/10 Referral   Medical Diagnosis from Referral: S32.811A (ICD-10-CM) - Multiple fractures of pelvis with unstable disruption of pelvic ring, initial encounter for closed fracture Z98.890,Z87.81 (ICD-10-CM) - S/P ORIF (open reduction internal fixation) fracture   Evaluation Date: 5/21/2020  Authorization Period Expiration: 03/10/2021  Plan of Care Expiration: 08/21/2020  Visit # / Visits authorized: 3/12  (21 prior)    Time In: 3:20 PM  Time Out: 4:15 PM  Total Billable Time: 55 minutes    Precautions: Standard, s/p pelvic ORIF, DVT LLE    Subjective     Pt reports: she is doing good today, typical pain around the hip. Pt states she painted and used the weed eater this weekend.   She was compliant with home exercise program.    Response to previous treatment: little bit of soreness   Functional change: able to swim, able to stoop down to pick items off floor    Pain: 2/10, Worst 4/10   Location: left hip      Objective     Janice received the following manual therapy techniques: Soft tissue Mobilization were applied to the: LLE for 0 minutes.  Long [place] Distraction LLE  MFR and STM to L ITB  NP: Vacuum/cupping STM with manual therapy techniques was performed to L ITB to decrease muscle tightness, increase circulation and promote healing process.  The pt's skin was monitored for redness adjusting pressure as needed. The pt was instructed in possible side effects of bruising and/or  "soreness.      Janice received therapeutic exercises to develop strength, endurance, ROM, flexibility, posture and core stabilization for 55 minutes including:  Bridge with yellow loop uni abd  2 x 10   SL Bridge x 10 each LE  Lateral Step Down at stairs 2 x 5 each LE   Forward step down at stairs x 10 LLE   SL Quad Stretch/Passive Hip Ext with PT assist 3 x 20"   Split squats over ground minimal tap for balance 2 x 5 each LE  Single Leg Sit to stand x 10 RLE 18" box with foam   Single Leg Sit to stand x 10 LLE 24" box   SLS on foam 2 x 30" each LE  Fitter Balance AP 3 x 30"   Shuttle Extension kickback red band x 10 each   Shuttle SL 1 red band LLE only 2 x 10   Shuttle 3 bands 2 x 15 BLE with yellow loop   Lateral Walking in cross walk Y loop x 1 lap   Shuttle 2 band 2 x 15 Uni with yellow loop    NP:  (NP today) Recumbent bike seat 2 level 3 x 5 minutes for endurance building  (NP today) Pilates step one spring L3 one L4; 2 x 10 each LE  (NP today) Sit <> stand 12 inch box with yellow loop x 10   (NP today) Squat in // bars with yellow loop 2 x 10 cues for weight shift  (NP today)Backwards Walking in // bars Y loop x 3 laps   Half kneel transition on airex foam to full kneel each leg  Full knee on airex foam with shoulder flexion x 10  Full kneel on oval thera band foam with ball toss x 20  Semi Prone Hip Ext/Abd 2 x 10 Y loop       Home Exercises Provided and Patient Education Provided     Education provided:   - continue with regular performance of HEP including split squats.      Written Home Exercises Provided: Patient instructed to cont prior HEP.   Exercises were reviewed and Janice was able to demonstrate them prior to the end of the session.  Janice demonstrated good  understanding of the education provided.     See EMR under Patient Instructions for exercises provided 5/25/2020, 7/16/2020.    Assessment     Pt able to progress glut strengthening today in open and closed chain with good muscle " "contraction. Pt continues to demonstrate weakness throughout L glut max and med, and sidelying shuttle added to address glut med weakness. Pt continues with anterior hip discomfort of LLE during posterior lunge/split squat. Pt unable to perform single leg sit to stand from 18" box today due to L hip pain, exercise modified to 24" step without pain. Pt may benefit from return of focused attention to myofascial restrictions along LLE next visit to promote optimal glute and posterior chain activation. Will continue to progress as tolerated.     Janice is progressing well towards her goals.   Pt prognosis is Good.     Pt will continue to benefit from skilled outpatient physical therapy to address the deficits listed in the problem list box on initial evaluation, provide pt/family education and to maximize pt's level of independence in the home and community environment.     Pt's spiritual, cultural and educational needs considered and pt agreeable to plan of care and goals.     Anticipated barriers to physical therapy: None     Goals:  Short Term Goals: 6 weeks (progressing, not met)  Pt will report decreased hip and LE pain to 6/10 at worst to increase tolerance for ADLs. MET  Pt will ambulate 200 feet with minimal to no gait deviations and least restrictive assistive device MET  Pt will increase L hip flexion by 10 degrees to increase tolerance for bed mobility and transfers. MET  Pt to tolerate HEP to improve ROM and independence with ADL's MET     Long Term Goals: 12 weeks (progressing, not met)  Pt will report > / = 30% decrease in difficulty tolerating prolonged standing in order to improve ability to perform household chores. MET  Increased lumbar flexion and extension ROM to 70% for increased ease with daily activities   Pt will ambulate 500 feet with minimal to no gait deviations and least restrictive AD to increase tolerance for community ambulation   Pt will be independent in all bathroom ADLs to reduce " caregiver burden and improve pt quality of life. MET  Pt will be independent in HEP and symptom management.     Plan     Continue with POC towards goals. Quad stretch/hip flexor stretch     Paty Velez, PT

## 2020-08-03 NOTE — PROGRESS NOTES
Physical Therapy Daily Treatment Note     Name: Janice Ramos  Clinic Number: [id number]    Therapy Diagnosis:   No diagnosis found.  Physician: Emily Nassar MD    Visit Date: 8/3/2020    Physician Orders: PT Eval and Treat Begin PWB 25% x 2 weeks, then 50% x 2 weeks, then WBAT x 2 weeks; eval and treat 3x/wk x 6 wks- From 3/10 Referral   Medical Diagnosis from Referral: S32.811A (ICD-10-CM) - Multiple fractures of pelvis with unstable disruption of pelvic ring, initial encounter for closed fracture Z98.890,Z87.81 (ICD-10-CM) - S/P ORIF (open reduction internal fixation) fracture   Evaluation Date: 5/21/2020  Authorization Period Expiration: 03/10/2021  Plan of Care Expiration: 08/21/2020  Visit # / Visits authorized: 2/12  (21 prior)    Time In: 2:05 PM  Time Out: 2:55 PM  Total Billable Time: 50 minutes    Precautions: Standard, s/p pelvic ORIF, DVT LLE    Subjective     Pt reports: she is doing good today, had to take medication last night to help her sleep because she continues to have discomfort at night. 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   She was compliant with home exercise program.    Response to previous treatment: little bit of soreness   Functional change: able to swim, able to stoop down to pick items off floor    Pain: 2/10, Worst 4/10   Location: left hip      Objective     Janice received the following manual therapy techniques: Soft tissue Mobilization were applied to the: LLE for 5 minutes.  Long [place] Distraction LLE  MFR and STM to L ITB  NP: Vacuum/cupping STM with manual therapy techniques was performed to L ITB to decrease muscle tightness, increase circulation and promote healing process.  The pt's skin was monitored for redness adjusting pressure as needed. The pt was instructed in possible side effects of bruising and/or soreness.      Janice received therapeutic exercises to develop strength, endurance, ROM, flexibility, posture  "and core stabilization for 45 minutes including:  Bridge with yellow loop x 30  SL Bridge x 10 each LE  Shuttle 3 bands 2 x 15 BLE with yellow loop   Shuttle Extension kickback red band x 10 each LE  Lateral Step Down at stairs 2 x 5 each LE   Forward step down at stairs x 10 LLE   Lateral Walking in cross walk Y loop x 1 lap   SLS with ball toss green ball x 10 each LE  Prone Quad Stretch 3 x 20" BLE  Recumbent bike seat 2 level 3 x 5 minutes for endurance building    NP:  Single Leg Sit to stand x 10 each LE 18" box with foam   Split squats over ground minimal tap for balance 2 x 5 each LE  SL Quad Stretch/Passive Hip Ext with PT assist 3 x 20"   (NP today) Shuttle 2 band 2 x 15 Uni with yellow loop  (NP today) Pilates step one spring L3 one L4; 2 x 10 each LE  (NP today) Sit <> stand 12 inch box with yellow loop x 10   (NP today) Squat in // bars with yellow loop 2 x 10 cues for weight shift  (NP today)Backwards Walking in // bars Y loop x 3 laps   Half kneel transition on airex foam to full kneel each leg  Full knee on airex foam with shoulder flexion x 10  Full kneel on oval thera band foam with ball toss x 20  Semi Prone Hip Ext/Abd 2 x 10 Y loop       Home Exercises Provided and Patient Education Provided     Education provided:   - continue with regular performance of HEP including split squats.      Written Home Exercises Provided: Patient instructed to cont prior HEP.   Exercises were reviewed and Janice was able to demonstrate them prior to the end of the session.  Janice demonstrated good  understanding of the education provided.     See EMR under Patient Instructions for exercises provided 5/25/2020, 7/16/2020.    Assessment     Pt displayed improvements in mechanics of lateral and forward step downs without cueing today. Pt continues to demonstrate increased myofascial tightness along anterior aspect of L hip, as well as through L ITB. Pt may benefit from return to manual techniques and cupping to " improve myofascial mobility to allow for appropriate muscle mobility. Pt able to perform all therex today without symptom provocation. Pt displayed improved glut activation and control with shuttle kickback. Will continue to progress as tolerated.     Janice is progressing well towards her goals.   Pt prognosis is Good.     Pt will continue to benefit from skilled outpatient physical therapy to address the deficits listed in the problem list box on initial evaluation, provide pt/family education and to maximize pt's level of independence in the home and community environment.     Pt's spiritual, cultural and educational needs considered and pt agreeable to plan of care and goals.     Anticipated barriers to physical therapy: None     Goals:  Short Term Goals: 6 weeks (progressing, not met)  Pt will report decreased hip and LE pain to 6/10 at worst to increase tolerance for ADLs. MET  Pt will ambulate 200 feet with minimal to no gait deviations and least restrictive assistive device MET  Pt will increase L hip flexion by 10 degrees to increase tolerance for bed mobility and transfers. MET  Pt to tolerate HEP to improve ROM and independence with ADL's MET     Long Term Goals: 12 weeks (progressing, not met)  Pt will report > / = 30% decrease in difficulty tolerating prolonged standing in order to improve ability to perform household chores. MET  Increased lumbar flexion and extension ROM to 70% for increased ease with daily activities   Pt will ambulate 500 feet with minimal to no gait deviations and least restrictive AD to increase tolerance for community ambulation   Pt will be independent in all bathroom ADLs to reduce caregiver burden and improve pt quality of life. MET  Pt will be independent in HEP and symptom management.     Plan     Continue with POC towards goals. Quad stretch/hip flexor stretch     Paty Velez, PT

## 2020-08-05 ENCOUNTER — CLINICAL SUPPORT (OUTPATIENT)
Dept: REHABILITATION | Facility: HOSPITAL | Age: 55
End: 2020-08-05
Payer: COMMERCIAL

## 2020-08-05 DIAGNOSIS — R26.89 DECREASED FUNCTIONAL MOBILITY: ICD-10-CM

## 2020-08-05 DIAGNOSIS — R26.9 IMPAIRED GAIT: ICD-10-CM

## 2020-08-05 DIAGNOSIS — R29.898 DECREASED STRENGTH OF LOWER EXTREMITY: ICD-10-CM

## 2020-08-05 DIAGNOSIS — M53.86 DECREASED RANGE OF MOTION OF LUMBAR SPINE: ICD-10-CM

## 2020-08-05 DIAGNOSIS — M25.652 DECREASED RANGE OF LEFT HIP MOVEMENT: ICD-10-CM

## 2020-08-05 PROCEDURE — 97110 THERAPEUTIC EXERCISES: CPT | Mod: PN

## 2020-08-05 PROCEDURE — 97140 MANUAL THERAPY 1/> REGIONS: CPT | Mod: PN

## 2020-08-06 ENCOUNTER — CLINICAL SUPPORT (OUTPATIENT)
Dept: REHABILITATION | Facility: HOSPITAL | Age: 55
End: 2020-08-06
Payer: COMMERCIAL

## 2020-08-06 DIAGNOSIS — M53.86 DECREASED RANGE OF MOTION OF LUMBAR SPINE: ICD-10-CM

## 2020-08-06 DIAGNOSIS — M25.652 DECREASED RANGE OF LEFT HIP MOVEMENT: ICD-10-CM

## 2020-08-06 DIAGNOSIS — R26.9 IMPAIRED GAIT: ICD-10-CM

## 2020-08-06 DIAGNOSIS — R29.898 DECREASED STRENGTH OF LOWER EXTREMITY: ICD-10-CM

## 2020-08-06 DIAGNOSIS — R26.89 DECREASED FUNCTIONAL MOBILITY: ICD-10-CM

## 2020-08-06 PROCEDURE — 97140 MANUAL THERAPY 1/> REGIONS: CPT | Mod: PN

## 2020-08-06 PROCEDURE — 97110 THERAPEUTIC EXERCISES: CPT | Mod: PN

## 2020-08-06 NOTE — PROGRESS NOTES
Physical Therapy Daily Treatment Note     Name: Janice Ramos  Clinic Number: [id number]    Therapy Diagnosis:   Encounter Diagnoses   Name Primary?    Decreased range of motion of lumbar spine     Decreased range of left hip movement     Decreased functional mobility     Impaired gait     Decreased strength of lower extremity      Physician: Emily Nassar MD    Visit Date: 8/6/2020    Physician Orders: PT Eval and Treat Begin PWB 25% x 2 weeks, then 50% x 2 weeks, then WBAT x 2 weeks; eval and treat 3x/wk x 6 wks- From 3/10 Referral   Medical Diagnosis from Referral: S32.811A (ICD-10-CM) - Multiple fractures of pelvis with unstable disruption of pelvic ring, initial encounter for closed fracture Z98.890,Z87.81 (ICD-10-CM) - S/P ORIF (open reduction internal fixation) fracture   Evaluation Date: 5/21/2020  Authorization Period Expiration: 03/10/2021  Plan of Care Expiration: 08/21/2020  Visit # / Visits authorized: 5/12  (21 prior)    Time In: 1:15 PM  Time Out: 2:10  PM  Total Billable Time: 55 minutes    Precautions: Standard, s/p pelvic ORIF, DVT LLE    Subjective     Pt reports: she is feeling good today, was a little sore following last visit in the left glut.  She was compliant with home exercise program.    Response to previous treatment: little bit of soreness   Functional change: able to swim, able to stoop down to pick items off floor    Pain: 2/10,   Location: left hip      Objective     Janice received the following manual therapy techniques: Soft tissue Mobilization were applied to the: LLE for 10 minutes.  Long [place] Distraction LLE  Lateral Hip distraction with belt   (NP) MFR and STM to L ITB  (NP) Vacuum/cupping STM with manual therapy techniques was performed to L ITB and lumbar spine to decrease muscle tightness, increase circulation and promote healing process.  The pt's skin was monitored for redness adjusting pressure as needed. The pt was instructed in possible side effects of  "bruising and/or soreness.        Janice received therapeutic exercises to develop strength, endurance, ROM, flexibility, posture and core stabilization for 45 minutes including:  Passive Hip Ext in prone 3 x 30" each LE  Prone quad stretch PT assist 3 x 30" each LE  Bridge with yellow loop uni abd  2 x 10   SL Bridge x 10 each LE  Modified HSS 3 x 20"   Split squats over ground minimal tap for balance 2 x 5 each LE  SLS on foam 2 x 30" each LE  Fitter Balance AP 3 x 30"   Shuttle Extension kickback red band x 10 each   Shuttle SL 1 red band LLE only 2 x 10   Shuttle 3 bands 2 x 15 BLE with yellow loop   Lateral Walking in cross walk Y loop x 1 lap   Shuttle 2 band 2 x 15 Uni with yellow loop      Not Performed:   Lateral Step Down at stairs 2 x 5 each LE   Forward step down at stairs x 10 LLE   Single Leg Sit to stand x 10 RLE 18" box with foam   Single Leg Sit to stand x 10 LLE 24" box   (NP today) Recumbent bike seat 2 level 3 x 5 minutes for endurance building        Home Exercises Provided and Patient Education Provided     Education provided:   - continue with regular performance of HEP including split squats.      Written Home Exercises Provided: Patient instructed to cont prior HEP.   Exercises were reviewed and Janice was able to demonstrate them prior to the end of the session.  Janice demonstrated good  understanding of the education provided.     See EMR under Patient Instructions for exercises provided 5/25/2020, 7/16/2020.    Assessment   Pt demonstrated improvements in L hip extension today with reduced anterior muscle tightness. Pt able to return to therex to include glut and quad strengthening. Pt required min cues to avoid compensation with split squats today. Will continue to progress as tolerated.     Janice is progressing well towards her goals.   Pt prognosis is Good.     Pt will continue to benefit from skilled outpatient physical therapy to address the deficits listed in the problem list " box on initial evaluation, provide pt/family education and to maximize pt's level of independence in the home and community environment.     Pt's spiritual, cultural and educational needs considered and pt agreeable to plan of care and goals.     Anticipated barriers to physical therapy: None     Goals:  Short Term Goals: 6 weeks (progressing, not met)  Pt will report decreased hip and LE pain to 6/10 at worst to increase tolerance for ADLs. MET  Pt will ambulate 200 feet with minimal to no gait deviations and least restrictive assistive device MET  Pt will increase L hip flexion by 10 degrees to increase tolerance for bed mobility and transfers. MET  Pt to tolerate HEP to improve ROM and independence with ADL's MET     Long Term Goals: 12 weeks (progressing, not met)  Pt will report > / = 30% decrease in difficulty tolerating prolonged standing in order to improve ability to perform household chores. MET  Increased lumbar flexion and extension ROM to 70% for increased ease with daily activities   Pt will ambulate 500 feet with minimal to no gait deviations and least restrictive AD to increase tolerance for community ambulation   Pt will be independent in all bathroom ADLs to reduce caregiver burden and improve pt quality of life. MET  Pt will be independent in HEP and symptom management.     Plan     Continue with POC towards goals. Quad stretch/hip flexor stretch     Paty Velez, PT

## 2020-08-10 ENCOUNTER — CLINICAL SUPPORT (OUTPATIENT)
Dept: REHABILITATION | Facility: HOSPITAL | Age: 55
End: 2020-08-10
Payer: COMMERCIAL

## 2020-08-10 DIAGNOSIS — M25.652 DECREASED RANGE OF LEFT HIP MOVEMENT: ICD-10-CM

## 2020-08-10 DIAGNOSIS — M53.86 DECREASED RANGE OF MOTION OF LUMBAR SPINE: ICD-10-CM

## 2020-08-10 DIAGNOSIS — R26.9 IMPAIRED GAIT: ICD-10-CM

## 2020-08-10 DIAGNOSIS — R29.898 DECREASED STRENGTH OF LOWER EXTREMITY: ICD-10-CM

## 2020-08-10 DIAGNOSIS — R26.89 DECREASED FUNCTIONAL MOBILITY: ICD-10-CM

## 2020-08-10 PROCEDURE — 97110 THERAPEUTIC EXERCISES: CPT | Mod: PN

## 2020-08-10 NOTE — PROGRESS NOTES
Physical Therapy Daily Treatment Note     Name: Janice Ramos  Clinic Number: [id number]    Therapy Diagnosis:   Encounter Diagnoses   Name Primary?    Decreased range of motion of lumbar spine     Decreased range of left hip movement     Decreased functional mobility     Impaired gait     Decreased strength of lower extremity      Physician: Emily Nassar MD    Visit Date: 8/10/2020    Physician Orders: PT Eval and Treat Begin PWB 25% x 2 weeks, then 50% x 2 weeks, then WBAT x 2 weeks; eval and treat 3x/wk x 6 wks- From 3/10 Referral   Medical Diagnosis from Referral: S32.811A (ICD-10-CM) - Multiple fractures of pelvis with unstable disruption of pelvic ring, initial encounter for closed fracture Z98.890,Z87.81 (ICD-10-CM) - S/P ORIF (open reduction internal fixation) fracture   Evaluation Date: 5/21/2020  Authorization Period Expiration: 03/10/2021  Plan of Care Expiration: 08/21/2020  Visit # / Visits authorized: 5/12  (21 prior)    Time In: 3:20 PM  Time Out:   4:15 PM  Total Billable Time: 55 minutes    Precautions: Standard, s/p pelvic ORIF, DVT LLE    Subjective     Pt reports: she is doing good today, no new complaints.   She was compliant with home exercise program.    Response to previous treatment: little bit of soreness   Functional change: able to swim, able to stoop down to pick items off floor    Pain: 2/10,   Location: left hip      Objective     Janice received the following manual therapy techniques: Soft tissue Mobilization were applied to the: LLE for 0 minutes.  Long [place] Distraction LLE  Lateral Hip distraction with belt   (NP) MFR and STM to L ITB  (NP) Vacuum/cupping STM with manual therapy techniques was performed to L ITB and lumbar spine to decrease muscle tightness, increase circulation and promote healing process.  The pt's skin was monitored for redness adjusting pressure as needed. The pt was instructed in possible side effects of bruising and/or soreness.   "      Janice received therapeutic exercises to develop strength, endurance, ROM, flexibility, posture and core stabilization for 55 minutes including:  Bridge with yellow loop uni abd  2 x 10   SL Bridge x 10 each LE  Modified HSS 3 x 20"   Prone quad stretch  3 x 30" each LE  Prone Hip ext x 10 each LE  SL Hip Abd LLE x 10   Shuttle Extension kickback red band x 10 each   Shuttle SL 1 red band LLE only 2 x 10   Shuttle 3 bands 2 x 15 BLE with yellow loop   Lateral Walking in cross walk Y loop x 1 lap   Shuttle 2 band 2 x 15 Uni with yellow loop  Recumbent bike seat 2 level 3 x 5 minutes for endurance building    Not Performed:   Passive Hip Ext in prone 3 x 30" each LE  Split squats over ground minimal tap for balance 2 x 5 each LE  SLS on foam 2 x 30" each LE  Fitter Balance AP 3 x 30"   Lateral Step Down at stairs 2 x 5 each LE   Forward step down at stairs x 10 LLE   Single Leg Sit to stand x 10 RLE 18" box with foam   Single Leg Sit to stand x 10 LLE 24" box   (NP today)       Home Exercises Provided and Patient Education Provided     Education provided:   - continue with regular performance of HEP including split squats.      Written Home Exercises Provided: yes.   Exercises were reviewed and Janice was able to demonstrate them prior to the end of the session.  Janice demonstrated good  understanding of the education provided.     See EMR under Patient Instructions for exercises provided 5/25/2020, 7/16/2020, 08/10/2020    Assessment   Pt displayed good tolerance for treatment session today, able to progress isolated glut strengthening with good muscle activation. Pt continues to demonstrate L glut weakness with closed chain strengthening. Pt will benefit from myofascial release of lumbar paraspinals, and STM to L glut next visit to improve myofascial mobility for optimal muscle contraction.     Janice is progressing well towards her goals.   Pt prognosis is Good.     Pt will continue to benefit from " skilled outpatient physical therapy to address the deficits listed in the problem list box on initial evaluation, provide pt/family education and to maximize pt's level of independence in the home and community environment.     Pt's spiritual, cultural and educational needs considered and pt agreeable to plan of care and goals.     Anticipated barriers to physical therapy: None     Goals:  Short Term Goals: 6 weeks (progressing, not met)  Pt will report decreased hip and LE pain to 6/10 at worst to increase tolerance for ADLs. MET  Pt will ambulate 200 feet with minimal to no gait deviations and least restrictive assistive device MET  Pt will increase L hip flexion by 10 degrees to increase tolerance for bed mobility and transfers. MET  Pt to tolerate HEP to improve ROM and independence with ADL's MET     Long Term Goals: 12 weeks (progressing, not met)  Pt will report > / = 30% decrease in difficulty tolerating prolonged standing in order to improve ability to perform household chores. MET  Increased lumbar flexion and extension ROM to 70% for increased ease with daily activities   Pt will ambulate 500 feet with minimal to no gait deviations and least restrictive AD to increase tolerance for community ambulation   Pt will be independent in all bathroom ADLs to reduce caregiver burden and improve pt quality of life. MET  Pt will be independent in HEP and symptom management.     Plan     Continue with POC towards goals. Quad stretch/hip flexor stretch     Paty Velez, PT

## 2020-08-12 ENCOUNTER — CLINICAL SUPPORT (OUTPATIENT)
Dept: REHABILITATION | Facility: HOSPITAL | Age: 55
End: 2020-08-12
Payer: COMMERCIAL

## 2020-08-12 DIAGNOSIS — M25.652 DECREASED RANGE OF LEFT HIP MOVEMENT: ICD-10-CM

## 2020-08-12 DIAGNOSIS — R26.9 IMPAIRED GAIT: ICD-10-CM

## 2020-08-12 DIAGNOSIS — R29.898 DECREASED STRENGTH OF LOWER EXTREMITY: ICD-10-CM

## 2020-08-12 DIAGNOSIS — M53.86 DECREASED RANGE OF MOTION OF LUMBAR SPINE: ICD-10-CM

## 2020-08-12 DIAGNOSIS — R26.89 DECREASED FUNCTIONAL MOBILITY: ICD-10-CM

## 2020-08-12 PROCEDURE — 97140 MANUAL THERAPY 1/> REGIONS: CPT | Mod: PN

## 2020-08-12 PROCEDURE — 97110 THERAPEUTIC EXERCISES: CPT | Mod: PN

## 2020-08-12 NOTE — PROGRESS NOTES
Physical Therapy Daily Treatment Note     Name: Janice Ramos  Clinic Number: [id number]    Therapy Diagnosis:   Encounter Diagnoses   Name Primary?    Decreased range of motion of lumbar spine     Decreased range of left hip movement     Decreased functional mobility     Impaired gait     Decreased strength of lower extremity      Physician: Emily Nassar MD    Visit Date: 8/12/2020    Physician Orders: PT Eval and Treat Begin PWB 25% x 2 weeks, then 50% x 2 weeks, then WBAT x 2 weeks; eval and treat 3x/wk x 6 wks- From 3/10 Referral   Medical Diagnosis from Referral: S32.811A (ICD-10-CM) - Multiple fractures of pelvis with unstable disruption of pelvic ring, initial encounter for closed fracture Z98.890,Z87.81 (ICD-10-CM) - S/P ORIF (open reduction internal fixation) fracture   Evaluation Date: 5/21/2020  Authorization Period Expiration: 03/10/2021  Plan of Care Expiration: 08/21/2020  Visit # / Visits authorized: 7/12  (21 prior)    Time In: 1:50  PM  Time Out:  2:45 PM  Total Billable Time: 45 minutes    Precautions: Standard, s/p pelvic ORIF, DVT LLE    Subjective     Pt reports: she had a little bit of groin pain dancing in the kitchen last night.   She was compliant with home exercise program.    Response to previous treatment: little bit of soreness   Functional change: cut the grass, danced    Pain: 2/10,   Location: left hip      Objective     Janice received the following manual therapy techniques: Soft tissue Mobilization were applied to the: LLE for 30 minutes.  Long [place] Distraction LLE  Lateral Hip distraction with belt   MFR and STM to L ITB  Vacuum/cupping STM with manual therapy techniques was performed to L ITB and lumbar spine to decrease muscle tightness, increase circulation and promote healing process.  The pt's skin was monitored for redness adjusting pressure as needed. The pt was instructed in possible side effects of bruising and/or soreness.        Janice received  "therapeutic exercises to develop strength, endurance, ROM, flexibility, posture and core stabilization for 15 minutes including:  Child's pose 5 x 10"   Lateral Child's pose x 10" each   Cat Cow x 3 rounds   Pec stretch on 1/2 foam x 2 min     Not Performed Today:  Bridge with yellow loop uni abd  2 x 10   SL Bridge x 10 each LE  Modified HSS 3 x 20"   Prone quad stretch  3 x 30" each LE  Prone Hip ext x 10 each LE  SL Hip Abd LLE x 10   Shuttle Extension kickback red band x 10 each   Shuttle SL 1 red band LLE only 2 x 10   Shuttle 3 bands 2 x 15 BLE with yellow loop   Lateral Walking in cross walk Y loop x 1 lap   Shuttle 2 band 2 x 15 Uni with yellow loop  Recumbent bike seat 2 level 3 x 5 minutes for endurance building    Not Performed:   Passive Hip Ext in prone 3 x 30" each LE  Split squats over ground minimal tap for balance 2 x 5 each LE  SLS on foam 2 x 30" each LE  Fitter Balance AP 3 x 30"   Lateral Step Down at stairs 2 x 5 each LE   Forward step down at stairs x 10 LLE   Single Leg Sit to stand x 10 RLE 18" box with foam   Single Leg Sit to stand x 10 LLE 24" box   (NP today)       Home Exercises Provided and Patient Education Provided     Education provided:   - continue with regular performance of HEP including split squats.      Written Home Exercises Provided: yes.   Exercises were reviewed and Janice was able to demonstrate them prior to the end of the session.  Janice demonstrated good  understanding of the education provided.     See EMR under Patient Instructions for exercises provided 5/25/2020, 7/16/2020, 08/10/2020    Assessment   Pt demonstrated considerable myofascial restriction in lumbar and thoracic spines today. Pt continues to display increased TTP to L glut med and piriformis. Pt may benefit from referral to medical massage for myofascial mobilization to upper thoracic spine to improve postural mobility and improve distal mobility. Myofascial restrictions like related to prolonged " hospital stay and increased stress. Pt demonstrated good tolerance for new lumbar mobility exercises today. Will continue to progress as tolerated.     Janice is progressing well towards her goals.   Pt prognosis is Good.     Pt will continue to benefit from skilled outpatient physical therapy to address the deficits listed in the problem list box on initial evaluation, provide pt/family education and to maximize pt's level of independence in the home and community environment.     Pt's spiritual, cultural and educational needs considered and pt agreeable to plan of care and goals.     Anticipated barriers to physical therapy: None     Goals:  Short Term Goals: 6 weeks (progressing, not met)  Pt will report decreased hip and LE pain to 6/10 at worst to increase tolerance for ADLs. MET  Pt will ambulate 200 feet with minimal to no gait deviations and least restrictive assistive device MET  Pt will increase L hip flexion by 10 degrees to increase tolerance for bed mobility and transfers. MET  Pt to tolerate HEP to improve ROM and independence with ADL's MET     Long Term Goals: 12 weeks (progressing, not met)  Pt will report > / = 30% decrease in difficulty tolerating prolonged standing in order to improve ability to perform household chores. MET  Increased lumbar flexion and extension ROM to 70% for increased ease with daily activities   Pt will ambulate 500 feet with minimal to no gait deviations and least restrictive AD to increase tolerance for community ambulation   Pt will be independent in all bathroom ADLs to reduce caregiver burden and improve pt quality of life. MET  Pt will be independent in HEP and symptom management.     Plan     Continue with POC towards goals.     Paty Velez, PT

## 2020-08-14 ENCOUNTER — CLINICAL SUPPORT (OUTPATIENT)
Dept: REHABILITATION | Facility: HOSPITAL | Age: 55
End: 2020-08-14
Payer: COMMERCIAL

## 2020-08-14 DIAGNOSIS — R26.9 IMPAIRED GAIT: ICD-10-CM

## 2020-08-14 DIAGNOSIS — R29.898 DECREASED STRENGTH OF LOWER EXTREMITY: ICD-10-CM

## 2020-08-14 DIAGNOSIS — M25.652 DECREASED RANGE OF LEFT HIP MOVEMENT: ICD-10-CM

## 2020-08-14 DIAGNOSIS — M53.86 DECREASED RANGE OF MOTION OF LUMBAR SPINE: ICD-10-CM

## 2020-08-14 DIAGNOSIS — R26.89 DECREASED FUNCTIONAL MOBILITY: ICD-10-CM

## 2020-08-14 PROCEDURE — 97112 NEUROMUSCULAR REEDUCATION: CPT | Mod: PN

## 2020-08-14 PROCEDURE — 97110 THERAPEUTIC EXERCISES: CPT | Mod: PN

## 2020-08-14 NOTE — PROGRESS NOTES
Physical Therapy Daily Treatment Note     Name: Janice Ramos  Clinic Number: [id number]    Therapy Diagnosis:   Encounter Diagnoses   Name Primary?    Decreased range of motion of lumbar spine     Decreased range of left hip movement     Decreased functional mobility     Impaired gait     Decreased strength of lower extremity      Physician: Emily Nassar MD    Visit Date: 8/14/2020    Physician Orders: PT Eval and Treat Begin PWB 25% x 2 weeks, then 50% x 2 weeks, then WBAT x 2 weeks; eval and treat 3x/wk x 6 wks- From 3/10 Referral   Medical Diagnosis from Referral: S32.811A (ICD-10-CM) - Multiple fractures of pelvis with unstable disruption of pelvic ring, initial encounter for closed fracture Z98.890,Z87.81 (ICD-10-CM) - S/P ORIF (open reduction internal fixation) fracture   Evaluation Date: 5/21/2020  Authorization Period Expiration: 03/10/2021  Plan of Care Expiration: 08/21/2020  Visit # / Visits authorized: 8/12  (21 prior)    Time In: 1:40 PM  Time Out: 2:35 PM  Total Billable Time: 55 minutes    Precautions: Standard, s/p pelvic ORIF, DVT LLE    Subjective     Pt reports: she is doing good today, her shoulders are sore because she cut the grass yesterday.   She was compliant with home exercise program.    Response to previous treatment: little bit of soreness   Functional change: cut the grass, danced    Pain: 1/10,   Location: left hip      Objective     Janice received the following manual therapy techniques: Soft tissue Mobilization were applied to the: LLE for 0 minutes.  Long [place] Distraction LLE  Lateral Hip distraction with belt   MFR and STM to L ITB  Vacuum/cupping STM with manual therapy techniques was performed to L ITB and lumbar spine to decrease muscle tightness, increase circulation and promote healing process.  The pt's skin was monitored for redness adjusting pressure as needed. The pt was instructed in possible side effects of bruising and/or soreness.        Janice  "received therapeutic exercises to develop strength, endurance, ROM, flexibility, posture and core stabilization for 45 minutes including:  Bridge with yellow loop uni abd  2 x 10   SL Bridge x 15 each LE  Prone Hip ext x 10 each LE  SL Hip Abd LLE x 10   Child's pose 5 x 10"   Prone quad stretch  3 x 30" each LE  Shuttle Extension kickback red band x 10 each   Shuttle SL 1 red band LLE only 2 x 10   Shuttle 2 band 2 x 15 Uni with yellow loop  Single Leg Sit to stand x 10 LLE 24" box     Not Performed Today:  Shuttle 3 bands 2 x 15 BLE with yellow loop   Lateral Walking in cross walk Y loop x 1 lap   Lateral Child's pose x 10" each   Cat Cow x 3 rounds   Pec stretch on 1/2 foam x 2 min   Modified HSS 3 x 20"   Recumbent bike seat 2 level 3 x 5 minutes for endurance building  Passive Hip Ext in prone 3 x 30" each LE  Split squats over ground minimal tap for balance 2 x 5 each LE  Lateral Step Down at stairs 2 x 5 each LE   Forward step down at stairs x 10 LLE   Single Leg Sit to stand x 10 RLE 18" box with foam     (NP today)       Patient participated in neuromuscular re-education activities to improve: Balance, Coordination, Kinesthetic, Sense, Proprioception and Posture for 10 minutes. The following activities were included:   SLS on foam 3 x 30" each LE  Fitter Balance AP 3 x 30"   Bosu Balance x 30" flat side up   BOSU balance with ball toss 2 x 30" flat side up      Home Exercises Provided and Patient Education Provided     Education provided:   - continue with regular performance of HEP including split squats.      Written Home Exercises Provided: yes.   Exercises were reviewed and Janice was able to demonstrate them prior to the end of the session.  Janice demonstrated good  understanding of the education provided.     See EMR under Patient Instructions for exercises provided 5/25/2020, 7/16/2020, 08/10/2020    Assessment   Pt demonstrated considerable myofascial restriction in lumbar and thoracic spines " today. Pt continues to display increased TTP to L glut med and piriformis. Pt may benefit from referral to medical massage for myofascial mobilization to upper thoracic spine to improve postural mobility and improve distal mobility. Myofascial restrictions like related to prolonged hospital stay and increased stress. Pt demonstrated good tolerance for new lumbar mobility exercises today. Will continue to progress as tolerated.     Janice is progressing well towards her goals.   Pt prognosis is Good.     Pt will continue to benefit from skilled outpatient physical therapy to address the deficits listed in the problem list box on initial evaluation, provide pt/family education and to maximize pt's level of independence in the home and community environment.     Pt's spiritual, cultural and educational needs considered and pt agreeable to plan of care and goals.     Anticipated barriers to physical therapy: None     Goals:  Short Term Goals: 6 weeks (progressing, not met)  Pt will report decreased hip and LE pain to 6/10 at worst to increase tolerance for ADLs. MET  Pt will ambulate 200 feet with minimal to no gait deviations and least restrictive assistive device MET  Pt will increase L hip flexion by 10 degrees to increase tolerance for bed mobility and transfers. MET  Pt to tolerate HEP to improve ROM and independence with ADL's MET     Long Term Goals: 12 weeks (progressing, not met)  Pt will report > / = 30% decrease in difficulty tolerating prolonged standing in order to improve ability to perform household chores. MET  Increased lumbar flexion and extension ROM to 70% for increased ease with daily activities   Pt will ambulate 500 feet with minimal to no gait deviations and least restrictive AD to increase tolerance for community ambulation   Pt will be independent in all bathroom ADLs to reduce caregiver burden and improve pt quality of life. MET  Pt will be independent in HEP and symptom management.     Plan      Continue with POC towards goals.     Paty Velez, PT

## 2020-08-17 ENCOUNTER — CLINICAL SUPPORT (OUTPATIENT)
Dept: REHABILITATION | Facility: HOSPITAL | Age: 55
End: 2020-08-17
Payer: COMMERCIAL

## 2020-08-17 DIAGNOSIS — R26.89 DECREASED FUNCTIONAL MOBILITY: ICD-10-CM

## 2020-08-17 DIAGNOSIS — R29.898 DECREASED STRENGTH OF LOWER EXTREMITY: ICD-10-CM

## 2020-08-17 DIAGNOSIS — M53.86 DECREASED RANGE OF MOTION OF LUMBAR SPINE: ICD-10-CM

## 2020-08-17 DIAGNOSIS — M25.652 DECREASED RANGE OF LEFT HIP MOVEMENT: ICD-10-CM

## 2020-08-17 DIAGNOSIS — R26.9 IMPAIRED GAIT: ICD-10-CM

## 2020-08-17 PROCEDURE — 97112 NEUROMUSCULAR REEDUCATION: CPT | Mod: PN

## 2020-08-17 PROCEDURE — 97110 THERAPEUTIC EXERCISES: CPT | Mod: PN

## 2020-08-17 NOTE — PROGRESS NOTES
Physical Therapy Daily Treatment Note     Name: Janice Ramos  Clinic Number: [id number]    Therapy Diagnosis:   Encounter Diagnoses   Name Primary?    Decreased range of motion of lumbar spine     Decreased range of left hip movement     Decreased functional mobility     Impaired gait     Decreased strength of lower extremity      Physician: Emily Nassar MD    Visit Date: 8/17/2020    Physician Orders: PT Eval and Treat Begin PWB 25% x 2 weeks, then 50% x 2 weeks, then WBAT x 2 weeks; eval and treat 3x/wk x 6 wks- From 3/10 Referral   Medical Diagnosis from Referral: S32.811A (ICD-10-CM) - Multiple fractures of pelvis with unstable disruption of pelvic ring, initial encounter for closed fracture Z98.890,Z87.81 (ICD-10-CM) - S/P ORIF (open reduction internal fixation) fracture   Evaluation Date: 5/21/2020  Authorization Period Expiration: 03/10/2021  Plan of Care Expiration: 08/21/2020  Visit # / Visits authorized: 9/12  (21 prior)    Time In: 3:15 PM  Time Out: 4:00 PM  Total Billable Time: 45 minutes    Precautions: Standard, s/p pelvic ORIF, DVT LLE    Subjective     Pt reports: she is feeling stiff today. Pt states she was unable to ride her bike this weekend due to weather.   She was compliant with home exercise program.    Response to previous treatment: little bit of soreness   Functional change: cut the grass, danced    Pain: 1/10,   Location: left hip      Objective     Janice received the following manual therapy techniques: Soft tissue Mobilization were applied to the: LLE for 0 minutes.  Long [place] Distraction LLE  Lateral Hip distraction with belt   MFR and STM to L ITB  Vacuum/cupping STM with manual therapy techniques was performed to L ITB and lumbar spine to decrease muscle tightness, increase circulation and promote healing process.  The pt's skin was monitored for redness adjusting pressure as needed. The pt was instructed in possible side effects of bruising and/or soreness.   "      Janice received therapeutic exercises to develop strength, endurance, ROM, flexibility, posture and core stabilization for 35  minutes including:  Bridge with yellow loop uni abd  2 x 10   SL Bridge x 15 each LE  SL Hip Abd LLE x 10   Prone Hip ext x 10 each LE  Prone quad stretch  3 x 30" each LEChild's pose 5 x 10"   (NP - time) Shuttle Extension kickback red band x 10 each   (NP - time) Shuttle SL 1 red band LLE only 2 x 10   (NP - time) Shuttle 2 band 2 x 15 Uni with yellow loop  Single Leg Sit to stand x 10 LLE 24" box     Not Performed Today:  Shuttle 3 bands 2 x 15 BLE with yellow loop   Lateral Walking in cross walk Y loop x 1 lap   Lateral Child's pose x 10" each   Cat Cow x 3 rounds   Pec stretch on 1/2 foam x 2 min   Modified HSS 3 x 20"   Recumbent bike seat 2 level 3 x 5 minutes for endurance building  Passive Hip Ext in prone 3 x 30" each LE  Split squats over ground minimal tap for balance 2 x 5 each LE  Lateral Step Down at stairs 2 x 5 each LE   Forward step down at stairs x 10 LLE   Single Leg Sit to stand x 10 RLE 18" box with foam       Patient participated in neuromuscular re-education activities to improve: Balance, Coordination, Kinesthetic, Sense, Proprioception and Posture for 10 minutes. The following activities were included:   SLS on foam 3 x 30" each LE  Fitter Balance AP 3 x 30"   Bosu Balance x 30" flat side up   BOSU balance with ball toss 2 x 30" flat side up      Home Exercises Provided and Patient Education Provided     Education provided:   - continue with regular performance of HEP including split squats.      Written Home Exercises Provided: yes.   Exercises were reviewed and Janice was able to demonstrate them prior to the end of the session.  Janice demonstrated good  understanding of the education provided.     See EMR under Patient Instructions for exercises provided 5/25/2020, 7/16/2020, 08/10/2020    Assessment   Pt displayed improved tolerance for balance " activities today with improve stability, allowing for progression of exercise. Pt demonstrated improved glut activation with prone hip extension today, continued difficulty maintaining appropriate form with SL abduction.     Janice is progressing well towards her goals.   Pt prognosis is Good.     Pt will continue to benefit from skilled outpatient physical therapy to address the deficits listed in the problem list box on initial evaluation, provide pt/family education and to maximize pt's level of independence in the home and community environment.     Pt's spiritual, cultural and educational needs considered and pt agreeable to plan of care and goals.     Anticipated barriers to physical therapy: None     Goals:  Short Term Goals: 6 weeks (progressing, not met)  Pt will report decreased hip and LE pain to 6/10 at worst to increase tolerance for ADLs. MET  Pt will ambulate 200 feet with minimal to no gait deviations and least restrictive assistive device MET  Pt will increase L hip flexion by 10 degrees to increase tolerance for bed mobility and transfers. MET  Pt to tolerate HEP to improve ROM and independence with ADL's MET     Long Term Goals: 12 weeks (progressing, not met)  Pt will report > / = 30% decrease in difficulty tolerating prolonged standing in order to improve ability to perform household chores. MET  Increased lumbar flexion and extension ROM to 70% for increased ease with daily activities   Pt will ambulate 500 feet with minimal to no gait deviations and least restrictive AD to increase tolerance for community ambulation   Pt will be independent in all bathroom ADLs to reduce caregiver burden and improve pt quality of life. MET  Pt will be independent in HEP and symptom management.     Plan     Continue with POC towards goals.     Paty Velez, PT

## 2020-08-19 ENCOUNTER — CLINICAL SUPPORT (OUTPATIENT)
Dept: REHABILITATION | Facility: HOSPITAL | Age: 55
End: 2020-08-19
Payer: COMMERCIAL

## 2020-08-19 DIAGNOSIS — M25.652 DECREASED RANGE OF LEFT HIP MOVEMENT: ICD-10-CM

## 2020-08-19 DIAGNOSIS — M53.86 DECREASED RANGE OF MOTION OF LUMBAR SPINE: ICD-10-CM

## 2020-08-19 DIAGNOSIS — R29.898 DECREASED STRENGTH OF LOWER EXTREMITY: ICD-10-CM

## 2020-08-19 DIAGNOSIS — R26.9 IMPAIRED GAIT: ICD-10-CM

## 2020-08-19 DIAGNOSIS — R26.89 DECREASED FUNCTIONAL MOBILITY: ICD-10-CM

## 2020-08-19 PROCEDURE — 97110 THERAPEUTIC EXERCISES: CPT | Mod: PN

## 2020-08-19 PROCEDURE — 97140 MANUAL THERAPY 1/> REGIONS: CPT | Mod: PN

## 2020-08-19 NOTE — PROGRESS NOTES
Physical Therapy Daily Treatment Note     Name: Janice Ramos  Clinic Number: [id number]    Therapy Diagnosis:   Encounter Diagnoses   Name Primary?    Decreased range of motion of lumbar spine     Decreased range of left hip movement     Decreased functional mobility     Impaired gait     Decreased strength of lower extremity      Physician: Emily Nassar MD    Visit Date: 8/19/2020    Physician Orders: PT Eval and Treat Begin PWB 25% x 2 weeks, then 50% x 2 weeks, then WBAT x 2 weeks; eval and treat 3x/wk x 6 wks- From 3/10 Referral   Medical Diagnosis from Referral: S32.811A (ICD-10-CM) - Multiple fractures of pelvis with unstable disruption of pelvic ring, initial encounter for closed fracture Z98.890,Z87.81 (ICD-10-CM) - S/P ORIF (open reduction internal fixation) fracture   Evaluation Date: 5/21/2020  Authorization Period Expiration: 03/10/2021  Plan of Care Expiration: 08/21/2020  Visit # / Visits authorized: 10/12  (21 prior)    Time In: 3:20 PM  Time Out: 4:15 PM  Total Billable Time: 45 minutes    Precautions: Standard, s/p pelvic ORIF, DVT LLE    Subjective     Pt reports: she is feeling stiff in her lateral hip today. Pt states she was climbing up on a stool organizing a closet.   She was compliant with home exercise program.    Response to previous treatment: little bit of soreness   Functional change: cut the grass, danced    Pain: 1/10,   Location: left hip      Objective     Janice received the following manual therapy techniques: Soft tissue Mobilization were applied to the: LLE for 35 minutes.  Long [place] Distraction LLE  (NP) Lateral Hip distraction with belt   MFR and STM to L ITB  Vacuum/cupping STM with manual therapy techniques was performed to L ITB and lumbar spine to decrease muscle tightness, increase circulation and promote healing process.  The pt's skin was monitored for redness adjusting pressure as needed. The pt was instructed in possible side effects of bruising  "and/or soreness.        Janice received therapeutic exercises to develop strength, endurance, ROM, flexibility, posture and core stabilization for 10  minutes including:  Child's pose 5 x 10"   SL quad stretch, passive by PT 3 x 20"     (NP - time)Bridge with yellow loop uni abd  2 x 10   (NP - time)SL Bridge x 15 each LE  (NP - time)SL Hip Abd LLE x 10   (NP - time)Prone Hip ext x 10 each LE  (NP - time)Prone quad stretch  3 x 30" each LE  (NP - time) Shuttle Extension kickback red band x 10 each   (NP - time) Shuttle SL 1 red band LLE only 2 x 10   (NP - time) Shuttle 2 band 2 x 15 Uni with yellow loop  (NP - time)Single Leg Sit to stand x 10 LLE 24" box     Not Performed Today:  Shuttle 3 bands 2 x 15 BLE with yellow loop   Lateral Walking in cross walk Y loop x 1 lap   Lateral Child's pose x 10" each   Cat Cow x 3 rounds   Pec stretch on 1/2 foam x 2 min   Modified HSS 3 x 20"   Recumbent bike seat 2 level 3 x 5 minutes for endurance building  Passive Hip Ext in prone 3 x 30" each LE  Split squats over ground minimal tap for balance 2 x 5 each LE  Lateral Step Down at stairs 2 x 5 each LE   Forward step down at stairs x 10 LLE   Single Leg Sit to stand x 10 RLE 18" box with foam       Patient participated in neuromuscular re-education activities to improve: Balance, Coordination, Kinesthetic, Sense, Proprioception and Posture for 0 minutes. The following activities were included:   SLS on foam 3 x 30" each LE  Fitter Balance AP 3 x 30"   Bosu Balance x 30" flat side up   BOSU balance with ball toss 2 x 30" flat side up      Home Exercises Provided and Patient Education Provided     Education provided:   - continue with regular performance of HEP including split squats.      Written Home Exercises Provided: yes.   Exercises were reviewed and Janice was able to demonstrate them prior to the end of the session.  Janice demonstrated good  understanding of the education provided.     See EMR under Patient " Instructions for exercises provided 5/25/2020, 7/16/2020, 08/10/2020    Assessment   Pt demonstrated increased TTP and myofascial restriction surrounding L greater trochanter today. Pt with increased TTP along ITB of LLE which improved with manual treatment today. Pt additionally demonstrating improvements in myofascial mobility of lumbar paraspinals compared to prior assessment with manual treatment. Pt with improvement postural awareness and lumbar mobility following treatment today. Will continue to progress as tolerated.     Janice is progressing well towards her goals.   Pt prognosis is Good.     Pt will continue to benefit from skilled outpatient physical therapy to address the deficits listed in the problem list box on initial evaluation, provide pt/family education and to maximize pt's level of independence in the home and community environment.     Pt's spiritual, cultural and educational needs considered and pt agreeable to plan of care and goals.     Anticipated barriers to physical therapy: None     Goals:  Short Term Goals: 6 weeks (progressing, not met)  Pt will report decreased hip and LE pain to 6/10 at worst to increase tolerance for ADLs. MET  Pt will ambulate 200 feet with minimal to no gait deviations and least restrictive assistive device MET  Pt will increase L hip flexion by 10 degrees to increase tolerance for bed mobility and transfers. MET  Pt to tolerate HEP to improve ROM and independence with ADL's MET     Long Term Goals: 12 weeks (progressing, not met)  Pt will report > / = 30% decrease in difficulty tolerating prolonged standing in order to improve ability to perform household chores. MET  Increased lumbar flexion and extension ROM to 70% for increased ease with daily activities   Pt will ambulate 500 feet with minimal to no gait deviations and least restrictive AD to increase tolerance for community ambulation   Pt will be independent in all bathroom ADLs to reduce caregiver burden  and improve pt quality of life. MET  Pt will be independent in HEP and symptom management.     Plan     Continue with POC towards goals.     Paty Velez, PT

## 2020-08-21 ENCOUNTER — CLINICAL SUPPORT (OUTPATIENT)
Dept: REHABILITATION | Facility: HOSPITAL | Age: 55
End: 2020-08-21
Payer: COMMERCIAL

## 2020-08-21 DIAGNOSIS — R26.9 IMPAIRED GAIT: ICD-10-CM

## 2020-08-21 DIAGNOSIS — M25.652 DECREASED RANGE OF LEFT HIP MOVEMENT: ICD-10-CM

## 2020-08-21 DIAGNOSIS — R26.89 DECREASED FUNCTIONAL MOBILITY: ICD-10-CM

## 2020-08-21 DIAGNOSIS — M53.86 DECREASED RANGE OF MOTION OF LUMBAR SPINE: ICD-10-CM

## 2020-08-21 DIAGNOSIS — R29.898 DECREASED STRENGTH OF LOWER EXTREMITY: ICD-10-CM

## 2020-08-21 PROCEDURE — 97112 NEUROMUSCULAR REEDUCATION: CPT | Mod: PN

## 2020-08-21 PROCEDURE — 97110 THERAPEUTIC EXERCISES: CPT | Mod: PN

## 2020-08-21 NOTE — PROGRESS NOTES
Physical Therapy Daily Treatment Note     Name: Janice Ramos  Clinic Number: [id number]    Therapy Diagnosis:   Encounter Diagnoses   Name Primary?    Decreased range of motion of lumbar spine     Decreased range of left hip movement     Decreased functional mobility     Impaired gait     Decreased strength of lower extremity      Physician: Emily Nassar MD    Visit Date: 8/21/2020    Physician Orders: PT Eval and Treat Begin PWB 25% x 2 weeks, then 50% x 2 weeks, then WBAT x 2 weeks; eval and treat 3x/wk x 6 wks- From 3/10 Referral   Medical Diagnosis from Referral: S32.811A (ICD-10-CM) - Multiple fractures of pelvis with unstable disruption of pelvic ring, initial encounter for closed fracture Z98.890,Z87.81 (ICD-10-CM) - S/P ORIF (open reduction internal fixation) fracture   Evaluation Date: 5/21/2020  Authorization Period Expiration: 03/10/2021  Plan of Care Expiration: 08/21/2020  Visit # / Visits authorized: 11/12  (21 prior)    Time In: 2:30 PM  Time Out: 3:25 PM  Total Billable Time: 55 minutes    Precautions: Standard, s/p pelvic ORIF, DVT LLE    Subjective     Pt reports: she is feeling stiff in her lateral hip today. Pt states she was climbing up on a stool organizing a closet.   She was compliant with home exercise program.    Response to previous treatment: little bit of soreness   Functional change: cut the grass, danced    Pain: 1/10,   Location: left hip      Objective     Janice received the following manual therapy techniques: Soft tissue Mobilization were applied to the: LLE for 5 minutes.  Long [place] Distraction LLE  (NP) Lateral Hip distraction with belt   (NP) MFR and STM to L ITB  (NP) Vacuum/cupping STM with manual therapy techniques was performed to L ITB and lumbar spine to decrease muscle tightness, increase circulation and promote healing process.  The pt's skin was monitored for redness adjusting pressure as needed. The pt was instructed in possible side effects of  "bruising and/or soreness.        Janice received therapeutic exercises to develop strength, endurance, ROM, flexibility, posture and core stabilization for 40  minutes including:  Bridge with yellow loop uni abd  2 x 10   SL Bridge x 15 each LE  SL Hip Abd LLE x 10   Prone Hip ext x 10 each LE  Prone quad stretch  3 x 30" each LE  Child's pose 5 x 10"   Shuttle Extension kickback red band x 10 each   Shuttle 2.5 band 2 x 15 Uni with yellow loop    (NP - time)SL quad stretch, passive by PT 3 x 20"   (NP - time) Shuttle SL 1 red band LLE only 2 x 10   (NP - time)Single Leg Sit to stand x 10 LLE 24" box     Not Performed Today:  Shuttle 3 bands 2 x 15 BLE with yellow loop   Lateral Walking in cross walk Y loop x 1 lap   Lateral Child's pose x 10" each   Cat Cow x 3 rounds   Pec stretch on 1/2 foam x 2 min   Modified HSS 3 x 20"   Recumbent bike seat 2 level 3 x 5 minutes for endurance building  Passive Hip Ext in prone 3 x 30" each LE  Split squats over ground minimal tap for balance 2 x 5 each LE  Lateral Step Down at stairs 2 x 5 each LE   Forward step down at stairs x 10 LLE   Single Leg Sit to stand x 10 RLE 18" box with foam       Patient participated in neuromuscular re-education activities to improve: Balance, Coordination, Kinesthetic, Sense, Proprioception and Posture for 10 minutes. The following activities were included:   SLS on foam 3 x 30" each LE  Fitter Balance AP 3 x 30"   Bosu Balance x 30" flat side up   BOSU balance with ball toss 2 x 30" flat side up red ball   BOSU mini squats x 10       Home Exercises Provided and Patient Education Provided     Education provided:   - continue with regular performance of HEP including split squats.      Written Home Exercises Provided: yes.   Exercises were reviewed and Janice was able to demonstrate them prior to the end of the session.  Janice demonstrated good  understanding of the education provided.     See EMR under Patient Instructions for exercises " provided 5/25/2020, 7/16/2020, 08/10/2020    Assessment   Pt demonstrated good tolerance for treatment session today, without increase in symptoms. Pt challenged with unilateral bridges and prone hip extension today, possibly due to continued glut weakness. Pt required pillow under abdomen during prone hip extension for reduced lumbar compensation. Pt able to progress balance challenges on BOSU to include mini squats and weighted ball toss with good stability. Will reassess next visit.     Janice is progressing well towards her goals.   Pt prognosis is Good.     Pt will continue to benefit from skilled outpatient physical therapy to address the deficits listed in the problem list box on initial evaluation, provide pt/family education and to maximize pt's level of independence in the home and community environment.     Pt's spiritual, cultural and educational needs considered and pt agreeable to plan of care and goals.     Anticipated barriers to physical therapy: None     Goals:  Short Term Goals: 6 weeks (progressing, not met)  Pt will report decreased hip and LE pain to 6/10 at worst to increase tolerance for ADLs. MET  Pt will ambulate 200 feet with minimal to no gait deviations and least restrictive assistive device MET  Pt will increase L hip flexion by 10 degrees to increase tolerance for bed mobility and transfers. MET  Pt to tolerate HEP to improve ROM and independence with ADL's MET     Long Term Goals: 12 weeks (progressing, not met)  Pt will report > / = 30% decrease in difficulty tolerating prolonged standing in order to improve ability to perform household chores. MET  Increased lumbar flexion and extension ROM to 70% for increased ease with daily activities   Pt will ambulate 500 feet with minimal to no gait deviations and least restrictive AD to increase tolerance for community ambulation   Pt will be independent in all bathroom ADLs to reduce caregiver burden and improve pt quality of life. MET  Pt  will be independent in HEP and symptom management.     Plan     Continue with POC towards goals.     Paty Velez, PT

## 2020-11-23 RX ORDER — ESCITALOPRAM OXALATE 20 MG/1
TABLET ORAL
Qty: 90 TABLET | Refills: 1 | Status: SHIPPED | OUTPATIENT
Start: 2020-11-23 | End: 2021-03-23

## 2020-12-21 ENCOUNTER — TELEPHONE (OUTPATIENT)
Dept: ORTHOPEDICS | Facility: CLINIC | Age: 55
End: 2020-12-21

## 2020-12-21 DIAGNOSIS — M25.512 PAIN IN JOINT OF LEFT SHOULDER: Primary | ICD-10-CM

## 2020-12-29 ENCOUNTER — PATIENT OUTREACH (OUTPATIENT)
Dept: ADMINISTRATIVE | Facility: OTHER | Age: 55
End: 2020-12-29

## 2021-01-05 ENCOUNTER — OFFICE VISIT (OUTPATIENT)
Dept: ORTHOPEDICS | Facility: CLINIC | Age: 56
End: 2021-01-05
Payer: COMMERCIAL

## 2021-01-05 ENCOUNTER — HOSPITAL ENCOUNTER (OUTPATIENT)
Dept: RADIOLOGY | Facility: HOSPITAL | Age: 56
Discharge: HOME | End: 2021-01-05
Attending: ORTHOPAEDIC SURGERY
Payer: COMMERCIAL

## 2021-01-05 DIAGNOSIS — M75.42 IMPINGEMENT SYNDROME OF LEFT SHOULDER: Primary | ICD-10-CM

## 2021-01-05 DIAGNOSIS — M25.512 PAIN IN JOINT OF LEFT SHOULDER: ICD-10-CM

## 2021-01-05 PROCEDURE — 1125F AMNT PAIN NOTED PAIN PRSNT: CPT | Mod: S$GLB,,, | Performed by: ORTHOPAEDIC SURGERY

## 2021-01-05 PROCEDURE — 99214 PR OFFICE/OUTPT VISIT, EST, LEVL IV, 30-39 MIN: ICD-10-PCS | Mod: 25,S$GLB,, | Performed by: ORTHOPAEDIC SURGERY

## 2021-01-05 PROCEDURE — 99999 PR PBB SHADOW E&M-EST. PATIENT-LVL III: CPT | Mod: PBBFAC,,, | Performed by: ORTHOPAEDIC SURGERY

## 2021-01-05 PROCEDURE — 73030 X-RAY EXAM OF SHOULDER: CPT | Mod: 26,LT,, | Performed by: RADIOLOGY

## 2021-01-05 PROCEDURE — 1125F PR PAIN SEVERITY QUANTIFIED, PAIN PRESENT: ICD-10-PCS | Mod: S$GLB,,, | Performed by: ORTHOPAEDIC SURGERY

## 2021-01-05 PROCEDURE — 20610 LARGE JOINT ASPIRATION/INJECTION: L SUBACROMIAL BURSA: ICD-10-PCS | Mod: LT,S$GLB,, | Performed by: ORTHOPAEDIC SURGERY

## 2021-01-05 PROCEDURE — 73030 XR SHOULDER TRAUMA 3 VIEW LEFT: ICD-10-PCS | Mod: 26,LT,, | Performed by: RADIOLOGY

## 2021-01-05 PROCEDURE — 73030 X-RAY EXAM OF SHOULDER: CPT | Mod: TC,PO,LT

## 2021-01-05 PROCEDURE — 20610 DRAIN/INJ JOINT/BURSA W/O US: CPT | Mod: LT,S$GLB,, | Performed by: ORTHOPAEDIC SURGERY

## 2021-01-05 PROCEDURE — 99214 OFFICE O/P EST MOD 30 MIN: CPT | Mod: 25,S$GLB,, | Performed by: ORTHOPAEDIC SURGERY

## 2021-01-05 PROCEDURE — 99999 PR PBB SHADOW E&M-EST. PATIENT-LVL III: ICD-10-PCS | Mod: PBBFAC,,, | Performed by: ORTHOPAEDIC SURGERY

## 2021-01-05 RX ADMIN — TRIAMCINOLONE ACETONIDE 40 MG: 40 INJECTION, SUSPENSION INTRA-ARTICULAR; INTRAMUSCULAR at 10:01

## 2021-01-06 ENCOUNTER — PATIENT MESSAGE (OUTPATIENT)
Dept: FAMILY MEDICINE | Facility: CLINIC | Age: 56
End: 2021-01-06

## 2021-01-07 RX ORDER — APIXABAN 5 MG/1
5 TABLET, FILM COATED ORAL 2 TIMES DAILY
Qty: 180 TABLET | Refills: 0 | Status: SHIPPED | OUTPATIENT
Start: 2021-01-07 | End: 2021-06-25

## 2021-01-07 RX ORDER — APIXABAN 5 MG/1
5 TABLET, FILM COATED ORAL 2 TIMES DAILY
COMMUNITY
Start: 2020-11-09 | End: 2021-01-07 | Stop reason: SDUPTHER

## 2021-01-21 PROBLEM — M75.42 IMPINGEMENT SYNDROME OF LEFT SHOULDER: Status: ACTIVE | Noted: 2021-01-21

## 2021-01-21 RX ORDER — TRIAMCINOLONE ACETONIDE 40 MG/ML
40 INJECTION, SUSPENSION INTRA-ARTICULAR; INTRAMUSCULAR
Status: DISCONTINUED | OUTPATIENT
Start: 2021-01-05 | End: 2021-01-21 | Stop reason: HOSPADM

## 2021-03-05 ENCOUNTER — PATIENT OUTREACH (OUTPATIENT)
Dept: ADMINISTRATIVE | Facility: OTHER | Age: 56
End: 2021-03-05

## 2021-03-05 DIAGNOSIS — Z12.31 BREAST CANCER SCREENING BY MAMMOGRAM: Primary | ICD-10-CM

## 2021-03-09 ENCOUNTER — OFFICE VISIT (OUTPATIENT)
Dept: ORTHOPEDICS | Facility: CLINIC | Age: 56
End: 2021-03-09
Payer: COMMERCIAL

## 2021-03-09 VITALS — BODY MASS INDEX: 28.07 KG/M2 | HEIGHT: 60 IN | WEIGHT: 143 LBS

## 2021-03-09 DIAGNOSIS — M75.42 IMPINGEMENT SYNDROME OF LEFT SHOULDER: Primary | ICD-10-CM

## 2021-03-09 PROCEDURE — 3008F BODY MASS INDEX DOCD: CPT | Mod: CPTII,S$GLB,, | Performed by: ORTHOPAEDIC SURGERY

## 2021-03-09 PROCEDURE — 3008F PR BODY MASS INDEX (BMI) DOCUMENTED: ICD-10-PCS | Mod: CPTII,S$GLB,, | Performed by: ORTHOPAEDIC SURGERY

## 2021-03-09 PROCEDURE — 99213 OFFICE O/P EST LOW 20 MIN: CPT | Mod: 25,S$GLB,, | Performed by: ORTHOPAEDIC SURGERY

## 2021-03-09 PROCEDURE — 99213 PR OFFICE/OUTPT VISIT, EST, LEVL III, 20-29 MIN: ICD-10-PCS | Mod: 25,S$GLB,, | Performed by: ORTHOPAEDIC SURGERY

## 2021-03-09 PROCEDURE — 99999 PR PBB SHADOW E&M-EST. PATIENT-LVL III: CPT | Mod: PBBFAC,,, | Performed by: ORTHOPAEDIC SURGERY

## 2021-03-09 PROCEDURE — 20610 DRAIN/INJ JOINT/BURSA W/O US: CPT | Mod: LT,S$GLB,, | Performed by: ORTHOPAEDIC SURGERY

## 2021-03-09 PROCEDURE — 99999 PR PBB SHADOW E&M-EST. PATIENT-LVL III: ICD-10-PCS | Mod: PBBFAC,,, | Performed by: ORTHOPAEDIC SURGERY

## 2021-03-09 PROCEDURE — 20610 LARGE JOINT ASPIRATION/INJECTION: L SUBACROMIAL BURSA: ICD-10-PCS | Mod: LT,S$GLB,, | Performed by: ORTHOPAEDIC SURGERY

## 2021-03-09 PROCEDURE — 1125F AMNT PAIN NOTED PAIN PRSNT: CPT | Mod: S$GLB,,, | Performed by: ORTHOPAEDIC SURGERY

## 2021-03-09 PROCEDURE — 1125F PR PAIN SEVERITY QUANTIFIED, PAIN PRESENT: ICD-10-PCS | Mod: S$GLB,,, | Performed by: ORTHOPAEDIC SURGERY

## 2021-03-09 RX ORDER — METHOCARBAMOL 750 MG/1
750 TABLET, FILM COATED ORAL 3 TIMES DAILY PRN
Qty: 33 TABLET | Refills: 0 | Status: SHIPPED | OUTPATIENT
Start: 2021-03-09 | End: 2023-12-08

## 2021-03-09 RX ADMIN — TRIAMCINOLONE ACETONIDE 40 MG: 40 INJECTION, SUSPENSION INTRA-ARTICULAR; INTRAMUSCULAR at 01:03

## 2021-03-23 RX ORDER — ESCITALOPRAM OXALATE 20 MG/1
TABLET ORAL
Qty: 90 TABLET | Refills: 1 | Status: SHIPPED | OUTPATIENT
Start: 2021-03-23 | End: 2022-03-14

## 2021-03-25 RX ORDER — TRIAMCINOLONE ACETONIDE 40 MG/ML
40 INJECTION, SUSPENSION INTRA-ARTICULAR; INTRAMUSCULAR
Status: DISCONTINUED | OUTPATIENT
Start: 2021-03-09 | End: 2021-03-25 | Stop reason: HOSPADM

## 2021-04-06 ENCOUNTER — PATIENT MESSAGE (OUTPATIENT)
Dept: ADMINISTRATIVE | Facility: HOSPITAL | Age: 56
End: 2021-04-06

## 2021-04-16 ENCOUNTER — PATIENT OUTREACH (OUTPATIENT)
Dept: ADMINISTRATIVE | Facility: OTHER | Age: 56
End: 2021-04-16

## 2021-04-29 ENCOUNTER — PATIENT MESSAGE (OUTPATIENT)
Dept: RESEARCH | Facility: HOSPITAL | Age: 56
End: 2021-04-29

## 2021-06-12 RX ORDER — ATORVASTATIN CALCIUM 80 MG/1
TABLET, FILM COATED ORAL
Qty: 90 TABLET | Refills: 0 | OUTPATIENT
Start: 2021-06-12

## 2021-06-25 ENCOUNTER — PATIENT MESSAGE (OUTPATIENT)
Dept: FAMILY MEDICINE | Facility: CLINIC | Age: 56
End: 2021-06-25

## 2021-06-25 RX ORDER — APIXABAN 5 MG/1
TABLET, FILM COATED ORAL
Qty: 180 TABLET | Refills: 0 | Status: SHIPPED | OUTPATIENT
Start: 2021-06-25 | End: 2022-01-11 | Stop reason: SDUPTHER

## 2021-07-07 ENCOUNTER — PATIENT MESSAGE (OUTPATIENT)
Dept: ADMINISTRATIVE | Facility: HOSPITAL | Age: 56
End: 2021-07-07

## 2021-07-08 DIAGNOSIS — Z86.73 HISTORY OF STROKE: Primary | ICD-10-CM

## 2021-07-08 DIAGNOSIS — E78.5 HYPERLIPIDEMIA, UNSPECIFIED HYPERLIPIDEMIA TYPE: ICD-10-CM

## 2021-07-08 RX ORDER — ATORVASTATIN CALCIUM 80 MG/1
TABLET, FILM COATED ORAL
Qty: 30 TABLET | Refills: 0 | Status: SHIPPED | OUTPATIENT
Start: 2021-07-08 | End: 2023-12-08 | Stop reason: SDUPTHER

## 2021-08-11 ENCOUNTER — TELEPHONE (OUTPATIENT)
Dept: FAMILY MEDICINE | Facility: CLINIC | Age: 56
End: 2021-08-11

## 2021-08-11 DIAGNOSIS — R30.0 DYSURIA: Primary | ICD-10-CM

## 2021-08-12 ENCOUNTER — LAB VISIT (OUTPATIENT)
Dept: LAB | Facility: HOSPITAL | Age: 56
End: 2021-08-12
Attending: FAMILY MEDICINE
Payer: COMMERCIAL

## 2021-08-12 DIAGNOSIS — R30.0 DYSURIA: ICD-10-CM

## 2021-08-12 LAB
BACTERIA #/AREA URNS AUTO: NORMAL /HPF
BILIRUB UR QL STRIP: NEGATIVE
CLARITY UR REFRACT.AUTO: ABNORMAL
COLOR UR AUTO: YELLOW
GLUCOSE UR QL STRIP: NEGATIVE
HGB UR QL STRIP: NEGATIVE
HYALINE CASTS UR QL AUTO: 0 /LPF
KETONES UR QL STRIP: ABNORMAL
LEUKOCYTE ESTERASE UR QL STRIP: NEGATIVE
MICROSCOPIC COMMENT: NORMAL
NITRITE UR QL STRIP: NEGATIVE
PH UR STRIP: 5 [PH] (ref 5–8)
PROT UR QL STRIP: ABNORMAL
RBC #/AREA URNS AUTO: 0 /HPF (ref 0–4)
SP GR UR STRIP: 1.02 (ref 1–1.03)
URN SPEC COLLECT METH UR: ABNORMAL
WBC #/AREA URNS AUTO: 0 /HPF (ref 0–5)

## 2021-08-12 PROCEDURE — 81001 URINALYSIS AUTO W/SCOPE: CPT | Performed by: FAMILY MEDICINE

## 2021-08-12 RX ORDER — NITROFURANTOIN 25; 75 MG/1; MG/1
100 CAPSULE ORAL 2 TIMES DAILY
Qty: 10 CAPSULE | Refills: 0 | Status: SHIPPED | OUTPATIENT
Start: 2021-08-12 | End: 2021-11-02 | Stop reason: ALTCHOICE

## 2021-09-08 ENCOUNTER — OFFICE VISIT (OUTPATIENT)
Dept: FAMILY MEDICINE | Facility: CLINIC | Age: 56
End: 2021-09-08
Payer: COMMERCIAL

## 2021-09-08 VITALS
WEIGHT: 157.63 LBS | TEMPERATURE: 98 F | OXYGEN SATURATION: 97 % | HEART RATE: 64 BPM | DIASTOLIC BLOOD PRESSURE: 82 MMHG | BODY MASS INDEX: 30.78 KG/M2 | SYSTOLIC BLOOD PRESSURE: 138 MMHG

## 2021-09-08 DIAGNOSIS — L25.9 CONTACT DERMATITIS, UNSPECIFIED CONTACT DERMATITIS TYPE, UNSPECIFIED TRIGGER: Primary | ICD-10-CM

## 2021-09-08 PROCEDURE — 99213 OFFICE O/P EST LOW 20 MIN: CPT | Mod: 25,S$GLB,, | Performed by: NURSE PRACTITIONER

## 2021-09-08 PROCEDURE — 99999 PR PBB SHADOW E&M-EST. PATIENT-LVL V: CPT | Mod: PBBFAC,,, | Performed by: NURSE PRACTITIONER

## 2021-09-08 PROCEDURE — 1160F RVW MEDS BY RX/DR IN RCRD: CPT | Mod: CPTII,S$GLB,, | Performed by: NURSE PRACTITIONER

## 2021-09-08 PROCEDURE — 99213 PR OFFICE/OUTPT VISIT, EST, LEVL III, 20-29 MIN: ICD-10-PCS | Mod: 25,S$GLB,, | Performed by: NURSE PRACTITIONER

## 2021-09-08 PROCEDURE — 1160F PR REVIEW ALL MEDS BY PRESCRIBER/CLIN PHARMACIST DOCUMENTED: ICD-10-PCS | Mod: CPTII,S$GLB,, | Performed by: NURSE PRACTITIONER

## 2021-09-08 PROCEDURE — 3075F SYST BP GE 130 - 139MM HG: CPT | Mod: CPTII,S$GLB,, | Performed by: NURSE PRACTITIONER

## 2021-09-08 PROCEDURE — 96372 THER/PROPH/DIAG INJ SC/IM: CPT | Mod: S$GLB,,, | Performed by: NURSE PRACTITIONER

## 2021-09-08 PROCEDURE — 99999 PR PBB SHADOW E&M-EST. PATIENT-LVL V: ICD-10-PCS | Mod: PBBFAC,,, | Performed by: NURSE PRACTITIONER

## 2021-09-08 PROCEDURE — 1159F PR MEDICATION LIST DOCUMENTED IN MEDICAL RECORD: ICD-10-PCS | Mod: CPTII,S$GLB,, | Performed by: NURSE PRACTITIONER

## 2021-09-08 PROCEDURE — 1159F MED LIST DOCD IN RCRD: CPT | Mod: CPTII,S$GLB,, | Performed by: NURSE PRACTITIONER

## 2021-09-08 PROCEDURE — 96372 PR INJECTION,THERAP/PROPH/DIAG2ST, IM OR SUBCUT: ICD-10-PCS | Mod: S$GLB,,, | Performed by: NURSE PRACTITIONER

## 2021-09-08 PROCEDURE — 3008F BODY MASS INDEX DOCD: CPT | Mod: CPTII,S$GLB,, | Performed by: NURSE PRACTITIONER

## 2021-09-08 PROCEDURE — 3075F PR MOST RECENT SYSTOLIC BLOOD PRESS GE 130-139MM HG: ICD-10-PCS | Mod: CPTII,S$GLB,, | Performed by: NURSE PRACTITIONER

## 2021-09-08 PROCEDURE — 3008F PR BODY MASS INDEX (BMI) DOCUMENTED: ICD-10-PCS | Mod: CPTII,S$GLB,, | Performed by: NURSE PRACTITIONER

## 2021-09-08 PROCEDURE — 3079F DIAST BP 80-89 MM HG: CPT | Mod: CPTII,S$GLB,, | Performed by: NURSE PRACTITIONER

## 2021-09-08 PROCEDURE — 3079F PR MOST RECENT DIASTOLIC BLOOD PRESSURE 80-89 MM HG: ICD-10-PCS | Mod: CPTII,S$GLB,, | Performed by: NURSE PRACTITIONER

## 2021-09-08 RX ORDER — CETIRIZINE HYDROCHLORIDE 10 MG/1
10 TABLET ORAL NIGHTLY
Qty: 10 TABLET | Refills: 0 | Status: SHIPPED | OUTPATIENT
Start: 2021-09-08 | End: 2022-09-08

## 2021-09-08 RX ORDER — TRIAMCINOLONE ACETONIDE 1 MG/G
OINTMENT TOPICAL 2 TIMES DAILY
Qty: 30 G | Refills: 0 | Status: SHIPPED | OUTPATIENT
Start: 2021-09-08 | End: 2023-12-08

## 2021-09-08 RX ORDER — DEXAMETHASONE SODIUM PHOSPHATE 4 MG/ML
8 INJECTION, SOLUTION INTRA-ARTICULAR; INTRALESIONAL; INTRAMUSCULAR; INTRAVENOUS; SOFT TISSUE
Status: COMPLETED | OUTPATIENT
Start: 2021-09-08 | End: 2021-09-08

## 2021-09-08 RX ADMIN — DEXAMETHASONE SODIUM PHOSPHATE 8 MG: 4 INJECTION, SOLUTION INTRA-ARTICULAR; INTRALESIONAL; INTRAMUSCULAR; INTRAVENOUS; SOFT TISSUE at 02:09

## 2021-11-01 ENCOUNTER — LAB VISIT (OUTPATIENT)
Dept: LAB | Facility: HOSPITAL | Age: 56
End: 2021-11-01
Payer: COMMERCIAL

## 2021-11-01 ENCOUNTER — TELEPHONE (OUTPATIENT)
Dept: FAMILY MEDICINE | Facility: CLINIC | Age: 56
End: 2021-11-01
Payer: COMMERCIAL

## 2021-11-01 DIAGNOSIS — R31.9 HEMATURIA, UNSPECIFIED TYPE: Primary | ICD-10-CM

## 2021-11-01 DIAGNOSIS — R30.0 DYSURIA: ICD-10-CM

## 2021-11-01 DIAGNOSIS — R30.0 DYSURIA: Primary | ICD-10-CM

## 2021-11-01 PROCEDURE — 81001 URINALYSIS AUTO W/SCOPE: CPT | Performed by: FAMILY MEDICINE

## 2021-11-01 PROCEDURE — 87086 URINE CULTURE/COLONY COUNT: CPT | Performed by: FAMILY MEDICINE

## 2021-11-02 ENCOUNTER — PATIENT MESSAGE (OUTPATIENT)
Dept: FAMILY MEDICINE | Facility: CLINIC | Age: 56
End: 2021-11-02
Payer: COMMERCIAL

## 2021-11-02 LAB
BACTERIA #/AREA URNS AUTO: ABNORMAL /HPF
BILIRUB UR QL STRIP: NEGATIVE
CLARITY UR REFRACT.AUTO: ABNORMAL
COLOR UR AUTO: YELLOW
GLUCOSE UR QL STRIP: NEGATIVE
HGB UR QL STRIP: ABNORMAL
KETONES UR QL STRIP: NEGATIVE
LEUKOCYTE ESTERASE UR QL STRIP: ABNORMAL
MICROSCOPIC COMMENT: ABNORMAL
NITRITE UR QL STRIP: NEGATIVE
PH UR STRIP: 5 [PH] (ref 5–8)
PROT UR QL STRIP: NEGATIVE
RBC #/AREA URNS AUTO: 7 /HPF (ref 0–4)
SP GR UR STRIP: 1.02 (ref 1–1.03)
SQUAMOUS #/AREA URNS AUTO: 9 /HPF
URN SPEC COLLECT METH UR: ABNORMAL
WBC #/AREA URNS AUTO: 21 /HPF (ref 0–5)

## 2021-11-02 RX ORDER — CEPHALEXIN 500 MG/1
500 CAPSULE ORAL EVERY 8 HOURS
Qty: 21 CAPSULE | Refills: 0 | Status: SHIPPED | OUTPATIENT
Start: 2021-11-02 | End: 2023-12-08

## 2021-11-03 LAB — BACTERIA UR CULT: NO GROWTH

## 2021-11-06 RX ORDER — ATORVASTATIN CALCIUM 80 MG/1
80 TABLET, FILM COATED ORAL DAILY
Qty: 30 TABLET | Refills: 0 | OUTPATIENT
Start: 2021-11-06

## 2021-11-22 ENCOUNTER — PATIENT OUTREACH (OUTPATIENT)
Dept: ADMINISTRATIVE | Facility: OTHER | Age: 56
End: 2021-11-22
Payer: COMMERCIAL

## 2021-11-23 ENCOUNTER — OFFICE VISIT (OUTPATIENT)
Dept: OBSTETRICS AND GYNECOLOGY | Facility: CLINIC | Age: 56
End: 2021-11-23
Payer: COMMERCIAL

## 2021-11-23 ENCOUNTER — HOSPITAL ENCOUNTER (OUTPATIENT)
Dept: RADIOLOGY | Facility: HOSPITAL | Age: 56
Discharge: HOME | End: 2021-11-23
Attending: OBSTETRICS & GYNECOLOGY
Payer: COMMERCIAL

## 2021-11-23 VITALS
SYSTOLIC BLOOD PRESSURE: 112 MMHG | BODY MASS INDEX: 29.9 KG/M2 | WEIGHT: 152.31 LBS | DIASTOLIC BLOOD PRESSURE: 80 MMHG | HEIGHT: 60 IN

## 2021-11-23 DIAGNOSIS — Z13.21 SCREENING FOR ENDOCRINE, NUTRITIONAL, METABOLIC AND IMMUNITY DISORDER: ICD-10-CM

## 2021-11-23 DIAGNOSIS — Z01.411 ENCOUNTER FOR GYNECOLOGICAL EXAMINATION (GENERAL) (ROUTINE) WITH ABNORMAL FINDINGS: Primary | ICD-10-CM

## 2021-11-23 DIAGNOSIS — Z13.820 SCREENING FOR OSTEOPOROSIS: ICD-10-CM

## 2021-11-23 DIAGNOSIS — Z12.39 ENCOUNTER FOR SCREENING FOR MALIGNANT NEOPLASM OF BREAST, UNSPECIFIED SCREENING MODALITY: ICD-10-CM

## 2021-11-23 DIAGNOSIS — Z12.4 CERVICAL CANCER SCREENING: ICD-10-CM

## 2021-11-23 DIAGNOSIS — R39.9 UTI SYMPTOMS: ICD-10-CM

## 2021-11-23 DIAGNOSIS — R14.0 BLOATING: ICD-10-CM

## 2021-11-23 DIAGNOSIS — Z13.29 SCREENING FOR ENDOCRINE, NUTRITIONAL, METABOLIC AND IMMUNITY DISORDER: ICD-10-CM

## 2021-11-23 DIAGNOSIS — Z13.228 SCREENING FOR ENDOCRINE, NUTRITIONAL, METABOLIC AND IMMUNITY DISORDER: ICD-10-CM

## 2021-11-23 DIAGNOSIS — R58 BLEEDING: ICD-10-CM

## 2021-11-23 DIAGNOSIS — Z13.0 SCREENING FOR ENDOCRINE, NUTRITIONAL, METABOLIC AND IMMUNITY DISORDER: ICD-10-CM

## 2021-11-23 LAB
BILIRUB UR QL STRIP: NEGATIVE
CLARITY UR REFRACT.AUTO: CLEAR
COLOR UR AUTO: YELLOW
GLUCOSE UR QL STRIP: NEGATIVE
HGB UR QL STRIP: NEGATIVE
KETONES UR QL STRIP: NEGATIVE
LEUKOCYTE ESTERASE UR QL STRIP: NEGATIVE
NITRITE UR QL STRIP: NEGATIVE
PH UR STRIP: 6 [PH] (ref 5–8)
PROT UR QL STRIP: NEGATIVE
SP GR UR STRIP: 1.01 (ref 1–1.03)
URN SPEC COLLECT METH UR: NORMAL

## 2021-11-23 PROCEDURE — 87624 HPV HI-RISK TYP POOLED RSLT: CPT | Performed by: OBSTETRICS & GYNECOLOGY

## 2021-11-23 PROCEDURE — 77080 DEXA BONE DENSITY SPINE HIP: ICD-10-PCS | Mod: 26,,, | Performed by: RADIOLOGY

## 2021-11-23 PROCEDURE — 81003 URINALYSIS AUTO W/O SCOPE: CPT | Performed by: OBSTETRICS & GYNECOLOGY

## 2021-11-23 PROCEDURE — 77080 DXA BONE DENSITY AXIAL: CPT | Mod: TC,PO

## 2021-11-23 PROCEDURE — 88175 CYTOPATH C/V AUTO FLUID REDO: CPT | Performed by: OBSTETRICS & GYNECOLOGY

## 2021-11-23 PROCEDURE — 99999 PR PBB SHADOW E&M-EST. PATIENT-LVL IV: ICD-10-PCS | Mod: PBBFAC,,, | Performed by: OBSTETRICS & GYNECOLOGY

## 2021-11-23 PROCEDURE — 99386 PR PREVENTIVE VISIT,NEW,40-64: ICD-10-PCS | Mod: 25,S$GLB,, | Performed by: OBSTETRICS & GYNECOLOGY

## 2021-11-23 PROCEDURE — 99386 PREV VISIT NEW AGE 40-64: CPT | Mod: 25,S$GLB,, | Performed by: OBSTETRICS & GYNECOLOGY

## 2021-11-23 PROCEDURE — 99999 PR PBB SHADOW E&M-EST. PATIENT-LVL IV: CPT | Mod: PBBFAC,,, | Performed by: OBSTETRICS & GYNECOLOGY

## 2021-11-23 PROCEDURE — 77080 DXA BONE DENSITY AXIAL: CPT | Mod: 26,,, | Performed by: RADIOLOGY

## 2021-11-29 LAB
CLINICAL INFO: NORMAL
CYTO CVX: NORMAL
CYTOLOGIST CVX/VAG CYTO: NORMAL
CYTOLOGIST CVX/VAG CYTO: NORMAL
CYTOLOGY CMNT CVX/VAG CYTO-IMP: NORMAL
CYTOLOGY PAP THIN PREP EXPLANATION: NORMAL
DATE OF PREVIOUS PAP: NO
DATE PREVIOUS BX: NO
GEN CATEG CVX/VAG CYTO-IMP: NORMAL
HPV I/H RISK 4 DNA CVX QL NAA+PROBE: NOT DETECTED
LMP START DATE: NORMAL
MICROORGANISM CVX/VAG CYTO: NORMAL
PATHOLOGIST CVX/VAG CYTO: NORMAL
SERVICE CMNT-IMP: NORMAL
SPECIMEN SOURCE CVX/VAG CYTO: NORMAL
STAT OF ADQ CVX/VAG CYTO-IMP: NORMAL

## 2021-12-27 ENCOUNTER — HOSPITAL ENCOUNTER (OUTPATIENT)
Dept: RADIOLOGY | Facility: HOSPITAL | Age: 56
Discharge: HOME | End: 2021-12-27
Attending: OBSTETRICS & GYNECOLOGY
Payer: COMMERCIAL

## 2021-12-27 ENCOUNTER — HOSPITAL ENCOUNTER (OUTPATIENT)
Dept: RADIOLOGY | Facility: HOSPITAL | Age: 56
Discharge: HOME | End: 2021-12-27
Attending: FAMILY MEDICINE
Payer: COMMERCIAL

## 2021-12-27 DIAGNOSIS — Z12.31 BREAST CANCER SCREENING BY MAMMOGRAM: ICD-10-CM

## 2021-12-27 DIAGNOSIS — R58 BLEEDING: ICD-10-CM

## 2021-12-27 DIAGNOSIS — R14.0 BLOATING: ICD-10-CM

## 2021-12-27 PROCEDURE — 77063 BREAST TOMOSYNTHESIS BI: CPT | Mod: 26,,, | Performed by: RADIOLOGY

## 2021-12-27 PROCEDURE — 76856 US EXAM PELVIC COMPLETE: CPT | Mod: 26,,, | Performed by: RADIOLOGY

## 2021-12-27 PROCEDURE — 77063 MAMMO DIGITAL SCREENING BILAT WITH TOMO: ICD-10-PCS | Mod: 26,,, | Performed by: RADIOLOGY

## 2021-12-27 PROCEDURE — 76830 US PELVIS COMP WITH TRANSVAG NON-OB (XPD): ICD-10-PCS | Mod: 26,,, | Performed by: RADIOLOGY

## 2021-12-27 PROCEDURE — 77067 SCR MAMMO BI INCL CAD: CPT | Mod: 26,,, | Performed by: RADIOLOGY

## 2021-12-27 PROCEDURE — 76830 TRANSVAGINAL US NON-OB: CPT | Mod: 26,,, | Performed by: RADIOLOGY

## 2021-12-27 PROCEDURE — 76856 US PELVIS COMP WITH TRANSVAG NON-OB (XPD): ICD-10-PCS | Mod: 26,,, | Performed by: RADIOLOGY

## 2021-12-27 PROCEDURE — 77067 MAMMO DIGITAL SCREENING BILAT WITH TOMO: ICD-10-PCS | Mod: 26,,, | Performed by: RADIOLOGY

## 2021-12-27 PROCEDURE — 76856 US EXAM PELVIC COMPLETE: CPT | Mod: TC,PN

## 2021-12-27 PROCEDURE — 77067 SCR MAMMO BI INCL CAD: CPT | Mod: TC,PN

## 2022-01-11 RX ORDER — APIXABAN 5 MG/1
TABLET, FILM COATED ORAL
Qty: 180 TABLET | Refills: 0 | Status: SHIPPED | OUTPATIENT
Start: 2022-01-11 | End: 2022-08-01

## 2022-03-11 NOTE — TELEPHONE ENCOUNTER
Care Due:                  Date            Visit Type   Department     Provider  --------------------------------------------------------------------------------                                EP -                              PRIMARY      [facility] FAMILY  Last Visit: 07-      CARE ([facility])   MEDICINE       Emily Nassar  Next Visit: None Scheduled  None         None Found                                                            Last  Test          Frequency    Reason                     Performed    Due Date  --------------------------------------------------------------------------------    Office Visit  12 months..  EScitalopram,              07- 07-                             atorvastatin.............    CMP.........  12 months..  atorvastatin.............  06- 05-    Powered by [employer]. Reference number: [id number].   3/10/2022 7:59:47 PM CST

## 2022-03-11 NOTE — TELEPHONE ENCOUNTER

## 2022-03-14 ENCOUNTER — PATIENT MESSAGE (OUTPATIENT)
Dept: FAMILY MEDICINE | Facility: CLINIC | Age: 57
End: 2022-03-14
Payer: COMMERCIAL

## 2022-03-14 RX ORDER — ESCITALOPRAM OXALATE 20 MG/1
TABLET ORAL
Qty: 90 TABLET | Refills: 0 | Status: SHIPPED | OUTPATIENT
Start: 2022-03-14 | End: 2022-06-30

## 2022-06-29 ENCOUNTER — PATIENT MESSAGE (OUTPATIENT)
Dept: FAMILY MEDICINE | Facility: CLINIC | Age: 57
End: 2022-06-29
Payer: COMMERCIAL

## 2022-06-29 NOTE — TELEPHONE ENCOUNTER
Care Due:                  Date            Visit Type   Department     Provider  --------------------------------------------------------------------------------                                EP -                              PRIMARY      [facility] FAMILY  Last Visit: 07-      CARE ([facility])   MEDICINE       Emily Nassar  Next Visit: None Scheduled  None         None Found                                                            Last  Test          Frequency    Reason                     Performed    Due Date  --------------------------------------------------------------------------------    Office Visit  12 months..  EScitalopram,              07- 07-                             atorvastatin.............    CMP.........  12 months..  atorvastatin.............  Not Found    Overdue    [employer] Embedded Care Gaps. Reference number: [id number]. 6/28/2022   9:31:55 PM CDT

## 2022-06-30 RX ORDER — ESCITALOPRAM OXALATE 20 MG/1
TABLET ORAL
Qty: 90 TABLET | Refills: 0 | Status: SHIPPED | OUTPATIENT
Start: 2022-06-30 | End: 2022-10-18

## 2022-08-01 RX ORDER — APIXABAN 5 MG/1
TABLET, FILM COATED ORAL
Qty: 180 TABLET | Refills: 1 | Status: SHIPPED | OUTPATIENT
Start: 2022-08-01 | End: 2023-07-28

## 2022-10-26 ENCOUNTER — OFFICE VISIT (OUTPATIENT)
Dept: FAMILY MEDICINE | Facility: CLINIC | Age: 57
End: 2022-10-26
Payer: COMMERCIAL

## 2022-10-26 VITALS
BODY MASS INDEX: 28.87 KG/M2 | HEIGHT: 60 IN | WEIGHT: 147.06 LBS | DIASTOLIC BLOOD PRESSURE: 60 MMHG | HEART RATE: 73 BPM | SYSTOLIC BLOOD PRESSURE: 120 MMHG | TEMPERATURE: 99 F | OXYGEN SATURATION: 97 %

## 2022-10-26 DIAGNOSIS — J02.9 PHARYNGITIS, UNSPECIFIED ETIOLOGY: Primary | ICD-10-CM

## 2022-10-26 LAB
CTP QC/QA: YES
CTP QC/QA: YES
S PYO RRNA THROAT QL PROBE: NEGATIVE
SARS-COV-2 RDRP RESP QL NAA+PROBE: NEGATIVE

## 2022-10-26 PROCEDURE — 99214 OFFICE O/P EST MOD 30 MIN: CPT | Mod: S$GLB,,, | Performed by: NURSE PRACTITIONER

## 2022-10-26 PROCEDURE — 1159F PR MEDICATION LIST DOCUMENTED IN MEDICAL RECORD: ICD-10-PCS | Mod: CPTII,S$GLB,, | Performed by: NURSE PRACTITIONER

## 2022-10-26 PROCEDURE — 87635 SARS-COV-2 COVID-19 AMP PRB: CPT | Mod: QW,S$GLB,, | Performed by: NURSE PRACTITIONER

## 2022-10-26 PROCEDURE — 87880 STREP A ASSAY W/OPTIC: CPT | Mod: QW,S$GLB,, | Performed by: NURSE PRACTITIONER

## 2022-10-26 PROCEDURE — 3074F SYST BP LT 130 MM HG: CPT | Mod: CPTII,S$GLB,, | Performed by: NURSE PRACTITIONER

## 2022-10-26 PROCEDURE — 1160F RVW MEDS BY RX/DR IN RCRD: CPT | Mod: CPTII,S$GLB,, | Performed by: NURSE PRACTITIONER

## 2022-10-26 PROCEDURE — 1160F PR REVIEW ALL MEDS BY PRESCRIBER/CLIN PHARMACIST DOCUMENTED: ICD-10-PCS | Mod: CPTII,S$GLB,, | Performed by: NURSE PRACTITIONER

## 2022-10-26 PROCEDURE — 3078F PR MOST RECENT DIASTOLIC BLOOD PRESSURE < 80 MM HG: ICD-10-PCS | Mod: CPTII,S$GLB,, | Performed by: NURSE PRACTITIONER

## 2022-10-26 PROCEDURE — 87081 CULTURE SCREEN ONLY: CPT | Performed by: NURSE PRACTITIONER

## 2022-10-26 PROCEDURE — 99214 PR OFFICE/OUTPT VISIT, EST, LEVL IV, 30-39 MIN: ICD-10-PCS | Mod: S$GLB,,, | Performed by: NURSE PRACTITIONER

## 2022-10-26 PROCEDURE — 87635: ICD-10-PCS | Mod: QW,S$GLB,, | Performed by: NURSE PRACTITIONER

## 2022-10-26 PROCEDURE — 87880 POCT RAPID STREP A: ICD-10-PCS | Mod: QW,S$GLB,, | Performed by: NURSE PRACTITIONER

## 2022-10-26 PROCEDURE — 1159F MED LIST DOCD IN RCRD: CPT | Mod: CPTII,S$GLB,, | Performed by: NURSE PRACTITIONER

## 2022-10-26 PROCEDURE — 99999 PR PBB SHADOW E&M-EST. PATIENT-LVL V: CPT | Mod: PBBFAC,,, | Performed by: NURSE PRACTITIONER

## 2022-10-26 PROCEDURE — 3074F PR MOST RECENT SYSTOLIC BLOOD PRESSURE < 130 MM HG: ICD-10-PCS | Mod: CPTII,S$GLB,, | Performed by: NURSE PRACTITIONER

## 2022-10-26 PROCEDURE — 3078F DIAST BP <80 MM HG: CPT | Mod: CPTII,S$GLB,, | Performed by: NURSE PRACTITIONER

## 2022-10-26 PROCEDURE — 99999 PR PBB SHADOW E&M-EST. PATIENT-LVL V: ICD-10-PCS | Mod: PBBFAC,,, | Performed by: NURSE PRACTITIONER

## 2022-10-26 RX ORDER — PROMETHAZINE HYDROCHLORIDE AND DEXTROMETHORPHAN HYDROBROMIDE 6.25; 15 MG/5ML; MG/5ML
5 SYRUP ORAL 3 TIMES DAILY PRN
Qty: 240 ML | Refills: 0 | Status: SHIPPED | OUTPATIENT
Start: 2022-10-26 | End: 2022-11-05

## 2022-10-26 RX ORDER — FLUTICASONE PROPIONATE 50 MCG
1 SPRAY, SUSPENSION (ML) NASAL DAILY
Qty: 16 G | Refills: 0 | Status: SHIPPED | OUTPATIENT
Start: 2022-10-26

## 2022-10-26 RX ORDER — AZITHROMYCIN 250 MG/1
TABLET, FILM COATED ORAL
Qty: 6 TABLET | Refills: 0 | Status: SHIPPED | OUTPATIENT
Start: 2022-10-26 | End: 2022-10-31

## 2022-10-26 NOTE — LETTER
October 26, 2022    Janice Ramos  [address] [address]         [facility]  [address] [address]  Phone: [phone]  Fax: [phone] October 26, 2022     Patient: Janice Ramos   YOB: 1965   Date of Visit: 10/26/2022       To Whom It May Concern:    It is my medical opinion that Janice Ramos should remain out of work until Monday 10/31/22 .    If you have any questions or concerns, please don't hesitate to call.    Sincerely,        Anastasiya Agrawal NP

## 2022-10-26 NOTE — PROGRESS NOTES
Subjective:       Patient ID: Janice Ramos is a 57 y.o. female.    Chief Complaint: Follow-up (Possible strp sore throat and coughing and sinus started )    Patient has had sore throat and cough since  with fever and chills. Has had strep exposure and flu exposure.    Past Medical History:  No date: Anxiety  2007: Bell's palsy      Comment:  R eye with residual deficit  No date: Colon polyp  No date: Fatigue  No date: Hypercholesteremia  2014: Stroke  No date: Vertigo    Past Surgical History:  2020: BONY PELVIS SURGERY      Comment:  ORIF Pelvis  No date:  SECTION  2018: COLONOSCOPY; N/A      Comment:  Procedure: COLONOSCOPY;  Surgeon: Lowell Bowen Jr., MD;  Location: [facility];  Service: Endoscopy;                 Laterality: N/A;  No date: TONSILLECTOMY    Review of patient's family history indicates:      Social History    Socioeconomic History      Marital status:     Occupational History        Employer: [id number]    Tobacco Use      Smoking status: Former        Types: Cigarettes        Quit date: 1998        Years since quittin.9      Smokeless tobacco: Never    Substance and Sexual Activity      Alcohol use: Yes        Alcohol/week: 2.0 standard drinks        Types: 2 Shots of liquor per week        Comment: social      Drug use: No      Sexual activity: Yes        Partners: Male    Social History Narrative      Works in [employer] at [employer].      Current Outpatient Medications:  atorvastatin (LIPITOR) 80 MG tablet, Take 1 tablet by mouth once daily, Disp: 30 tablet, Rfl: 0  b complex vitamins tablet, Take 1 tablet by mouth once daily., Disp: , Rfl:    cephALEXin (KEFLEX) 500 MG capsule, Take 1 capsule (500 mg total) by mouth every 8 (eight) hours., Disp: 21 capsule, Rfl: 0  cetirizine (ZYRTEC) 10 MG tablet, Take 10 mg by mouth once daily., Disp: , Rfl:   diclofenac sodium (VOLTAREN) 1 % Gel, Apply 2 g topically  as needed. , Disp: , Rfl:   ELIQUIS 5 mg Tab, Take 1 tablet by mouth twice daily, Disp: 180 tablet, Rfl: 1  EScitalopram oxalate (LEXAPRO) 20 MG tablet, Take 1 tablet by mouth once daily, Disp: 90 tablet, Rfl: 0  fluticasone (FLONASE) 50 mcg/actuation nasal spray, 1 spray by Each Nare route once daily., Disp: 1 Bottle, Rfl: 11  methocarbamoL (ROBAXIN) 750 MG Tab, Take 1 tablet (750 mg total) by mouth 3 (three) times daily as needed., Disp: 33 tablet, Rfl: 0  multivitamin (THERAGRAN) per tablet, Take 1 tablet by mouth once daily., Disp: , Rfl:   ondansetron (ZOFRAN-ODT) 4 MG TbDL, , Disp: , Rfl:   oxyCODONE (ROXICODONE) 5 MG immediate release tablet, Take 1 tablet (5 mg total) by mouth every 24 hours as needed for Pain., Disp: 30 tablet, Rfl: 0  triamcinolone acetonide 0.1% (KENALOG) 0.1 % ointment, Apply topically 2 (two) times daily., Disp: 30 g, Rfl: 0  azithromycin (Z-MATTHEW) 250 MG tablet, Take 2 tablets by mouth on day 1; Take 1 tablet by mouth on days 2-5, Disp: 6 tablet, Rfl: 0  fluticasone propionate (FLONASE) 50 mcg/actuation nasal spray, 1 spray (50 mcg total) by Each Nostril route once daily., Disp: 16 g, Rfl: 0  promethazine-dextromethorphan (PROMETHAZINE-DM) 6.25-15 mg/5 mL Syrp, Take 5 mLs by mouth 3 (three) times daily as needed., Disp: 240 mL, Rfl: 0    No current facility-administered medications for this visit.      Review of patient's allergies indicates:   -- Codeine -- Nausea Only     Review of Systems   Constitutional:  Positive for chills, fatigue and fever.   HENT:  Positive for postnasal drip, rhinorrhea and sore throat.    Respiratory:  Positive for cough. Negative for shortness of breath and wheezing.    Cardiovascular: Negative.    Gastrointestinal: Negative.    Neurological:  Positive for headaches. Negative for dizziness and light-headedness.       Objective:      Physical Exam  Constitutional:       Appearance: She is ill-appearing.   HENT:      Head: Normocephalic and atraumatic.       Right Ear: No middle ear effusion.      Left Ear:  No middle ear effusion.      Nose: Rhinorrhea present.      Right Sinus: No maxillary sinus tenderness.      Left Sinus: No maxillary sinus tenderness.      Mouth/Throat:      Pharynx: Oropharyngeal exudate and posterior oropharyngeal erythema present.   Cardiovascular:      Rate and Rhythm: Normal rate and regular rhythm.   Pulmonary:      Effort: Pulmonary effort is normal. No respiratory distress.      Breath sounds: Normal breath sounds.   Neurological:      Mental Status: She is alert and oriented to person, place, and time.   Psychiatric:         Mood and Affect: Mood normal.         Behavior: Behavior normal.       Assessment:       Problem List Items Addressed This Visit    None  Visit Diagnoses       Pharyngitis, unspecified etiology    -  Primary    Relevant Medications    azithromycin (Z-MATTHEW) 250 MG tablet    fluticasone propionate (FLONASE) 50 mcg/actuation nasal spray    promethazine-dextromethorphan (PROMETHAZINE-DM) 6.25-15 mg/5 mL Syrp    Other Relevant Orders    POCT Rapid Strep A (Completed)    Influenza A & B by Molecular    POCT COVID-19 Rapid Screening (Completed)    CULTURE, STREP A,  THROAT              Plan:       1. Pharyngitis, unspecified etiology  Strep and covid negative, no rapid flu available, antigen sent out to lab patient aware will not be resulted until tomorrow, symptomatic treatment for now, add zpack if flu negative. Follow up if symptoms are not resolving in 48-72 hours, follow up immediately for new or worsening symptoms, ED precautions discussed.    - POCT Rapid Strep A  - Influenza A & B by Molecular  - POCT COVID-19 Rapid Screening  - azithromycin (Z-MATTHEW) 250 MG tablet; Take 2 tablets by mouth on day 1; Take 1 tablet by mouth on days 2-5  Dispense: 6 tablet; Refill: 0  - fluticasone propionate (FLONASE) 50 mcg/actuation nasal spray; 1 spray (50 mcg total) by Each Nostril route once daily.  Dispense: 16 g; Refill: 0  -  promethazine-dextromethorphan (PROMETHAZINE-DM) 6.25-15 mg/5 mL Syrp; Take 5 mLs by mouth 3 (three) times daily as needed.  Dispense: 240 mL; Refill: 0  - CULTURE, STREP A,  THROAT

## 2022-10-27 ENCOUNTER — TELEPHONE (OUTPATIENT)
Dept: FAMILY MEDICINE | Facility: CLINIC | Age: 57
End: 2022-10-27
Payer: COMMERCIAL

## 2022-10-27 NOTE — TELEPHONE ENCOUNTER
----- Message from Kat Trinh sent at 10/27/2022  5:16 AM CDT -----  Regarding: Lab Client Services  Contact: [phone]  Good Morning,     My name is Kat Trinh I work in the Lab Client Services. We had a problem with some lab work on this patient. If someone from your office could call us at [phone] or ext. [phone] that would be great. Anyone in my department can help.      Thank you

## 2022-10-28 ENCOUNTER — TELEPHONE (OUTPATIENT)
Dept: FAMILY MEDICINE | Facility: CLINIC | Age: 57
End: 2022-10-28
Payer: COMMERCIAL

## 2022-10-28 NOTE — TELEPHONE ENCOUNTER
----- Message from Caridad Pop sent at 10/28/2022  9:59 AM CDT -----  .Type:  Patient Call Back    Who Called: PT       Does the patient know what this is regarding?: TEST RESULTS FOR FLU AND MEDICATION     Would the patient rather a call back YES     Best Call Back Number: [phone]    Additional Information: Thank You

## 2022-10-28 NOTE — TELEPHONE ENCOUNTER
----- Message from Caridad Pop sent at 10/28/2022  9:59 AM CDT -----  .Type:  Patient Call Back    Who Called: PT       Does the patient know what this is regarding?: TEST RESULTS FOR FLU AND MEDICATION     Would the patient rather a call back YES     Best Call Back Number: [phone]    Additional Information: Thank You

## 2022-10-28 NOTE — TELEPHONE ENCOUNTER
----- Message from Kate Tony sent at 10/28/2022  2:06 PM CDT -----  Regarding: RETURNING MISSED; SAME DAY CALL BACK REQUEST  Contact: Patient  Type: Patient Call Back         Who called: Patient         What is the request in detail: returning missed call from nurse regarding flu results and getting Rx; requesting another call back; please advise         Best call back number: [phone]         Additional Information:   [place] [id number] - [place] - [address]  [address] [place] [address]  Phone: [phone] Fax: [phone]             Thank You

## 2022-10-29 LAB — BACTERIA THROAT CULT: NORMAL

## 2022-10-31 ENCOUNTER — PATIENT MESSAGE (OUTPATIENT)
Dept: FAMILY MEDICINE | Facility: CLINIC | Age: 57
End: 2022-10-31
Payer: COMMERCIAL

## 2023-01-11 DIAGNOSIS — Z12.31 OTHER SCREENING MAMMOGRAM: ICD-10-CM

## 2023-08-22 RX ORDER — ESCITALOPRAM OXALATE 20 MG/1
20 TABLET ORAL DAILY
Qty: 90 TABLET | Refills: 0 | Status: SHIPPED | OUTPATIENT
Start: 2023-08-22 | End: 2023-12-01 | Stop reason: SDUPTHER

## 2023-08-22 NOTE — TELEPHONE ENCOUNTER
Care Due:                  Date            Visit Type   Department     Provider  --------------------------------------------------------------------------------    Last Visit: None Found      None         None Found  Next Visit: None Scheduled  None         None Found                                                            Last  Test          Frequency    Reason                     Performed    Due Date  --------------------------------------------------------------------------------    Office Visit  12 months..  EScitalopram.............  Not Found    Overdue    [employer] Embedded Care Due Messages. Reference number: [id number].   8/22/2023 5:30:51 AM GARYT

## 2023-12-01 RX ORDER — ESCITALOPRAM OXALATE 20 MG/1
20 TABLET ORAL DAILY
Qty: 90 TABLET | Refills: 0 | Status: SHIPPED | OUTPATIENT
Start: 2023-12-01 | End: 2023-12-08 | Stop reason: SDUPTHER

## 2023-12-01 NOTE — TELEPHONE ENCOUNTER
----- Message from Rhoda Kaminski sent at 12/1/2023 12:22 PM CST -----  Regarding: medication question  Contact: pt  Type:  Needs Medical Advice    Who Called: pt  Pharmacy name and phone #:    [id number] - [place] - [address]  [address]  [place] [address]  Phone: [phone] Fax: [phone]      Would the patient rather a call back or a response via [facility]? Call back  Best Call Back Number: [phone]    Additional Information: sts she would like to know why her RX for EScitalopram oxalate (LEXAPRO) 20 MG tablet was denied

## 2023-12-01 NOTE — TELEPHONE ENCOUNTER
No care due was identified.  Health [facility] Embedded Care Due Messages. Reference number: [id number].   12/01/2023 5:01:34 PM CST

## 2023-12-01 NOTE — TELEPHONE ENCOUNTER
Notified pt that she has not been seen in over a year and needs an appt for med refill    Appt scheduled for 12/8     Please send in refill if appropriate

## 2023-12-08 ENCOUNTER — OFFICE VISIT (OUTPATIENT)
Dept: FAMILY MEDICINE | Facility: CLINIC | Age: 58
End: 2023-12-08
Payer: COMMERCIAL

## 2023-12-08 VITALS
HEART RATE: 72 BPM | BODY MASS INDEX: 31.55 KG/M2 | OXYGEN SATURATION: 98 % | DIASTOLIC BLOOD PRESSURE: 80 MMHG | HEIGHT: 60 IN | SYSTOLIC BLOOD PRESSURE: 130 MMHG | WEIGHT: 160.69 LBS

## 2023-12-08 DIAGNOSIS — R10.31 RIGHT LOWER QUADRANT PAIN: ICD-10-CM

## 2023-12-08 DIAGNOSIS — Z86.73 HISTORY OF STROKE: ICD-10-CM

## 2023-12-08 DIAGNOSIS — Z00.00 ROUTINE GENERAL MEDICAL EXAMINATION AT A HEALTH CARE FACILITY: Primary | ICD-10-CM

## 2023-12-08 DIAGNOSIS — E66.9 OBESITY, UNSPECIFIED CLASSIFICATION, UNSPECIFIED OBESITY TYPE, UNSPECIFIED WHETHER SERIOUS COMORBIDITY PRESENT: ICD-10-CM

## 2023-12-08 PROBLEM — R26.9 IMPAIRED GAIT: Status: RESOLVED | Noted: 2020-05-22 | Resolved: 2023-12-08

## 2023-12-08 PROBLEM — M53.86 DECREASED RANGE OF MOTION OF LUMBAR SPINE: Status: RESOLVED | Noted: 2020-05-22 | Resolved: 2023-12-08

## 2023-12-08 PROBLEM — S32.811A: Status: RESOLVED | Noted: 2020-03-10 | Resolved: 2023-12-08

## 2023-12-08 PROBLEM — R26.89 DECREASED FUNCTIONAL MOBILITY: Status: RESOLVED | Noted: 2020-05-22 | Resolved: 2023-12-08

## 2023-12-08 PROBLEM — Z87.81 S/P ORIF (OPEN REDUCTION INTERNAL FIXATION) FRACTURE: Status: RESOLVED | Noted: 2020-03-10 | Resolved: 2023-12-08

## 2023-12-08 PROBLEM — Z98.890 S/P ORIF (OPEN REDUCTION INTERNAL FIXATION) FRACTURE: Status: RESOLVED | Noted: 2020-03-10 | Resolved: 2023-12-08

## 2023-12-08 PROBLEM — M25.652 DECREASED RANGE OF LEFT HIP MOVEMENT: Status: RESOLVED | Noted: 2020-05-22 | Resolved: 2023-12-08

## 2023-12-08 PROBLEM — M70.62 GREATER TROCHANTERIC BURSITIS OF LEFT HIP: Status: RESOLVED | Noted: 2020-03-10 | Resolved: 2023-12-08

## 2023-12-08 PROBLEM — R29.898 DECREASED STRENGTH OF LOWER EXTREMITY: Status: RESOLVED | Noted: 2020-05-22 | Resolved: 2023-12-08

## 2023-12-08 PROBLEM — M75.42 IMPINGEMENT SYNDROME OF LEFT SHOULDER: Status: RESOLVED | Noted: 2021-01-21 | Resolved: 2023-12-08

## 2023-12-08 PROCEDURE — 99396 PREV VISIT EST AGE 40-64: CPT | Mod: S$GLB,,, | Performed by: FAMILY MEDICINE

## 2023-12-08 PROCEDURE — 1160F PR REVIEW ALL MEDS BY PRESCRIBER/CLIN PHARMACIST DOCUMENTED: ICD-10-PCS | Mod: CPTII,S$GLB,, | Performed by: FAMILY MEDICINE

## 2023-12-08 PROCEDURE — 3079F DIAST BP 80-89 MM HG: CPT | Mod: CPTII,S$GLB,, | Performed by: FAMILY MEDICINE

## 2023-12-08 PROCEDURE — 1159F PR MEDICATION LIST DOCUMENTED IN MEDICAL RECORD: ICD-10-PCS | Mod: CPTII,S$GLB,, | Performed by: FAMILY MEDICINE

## 2023-12-08 PROCEDURE — 3075F SYST BP GE 130 - 139MM HG: CPT | Mod: CPTII,S$GLB,, | Performed by: FAMILY MEDICINE

## 2023-12-08 PROCEDURE — 99999 PR PBB SHADOW E&M-EST. PATIENT-LVL IV: CPT | Mod: PBBFAC,,, | Performed by: FAMILY MEDICINE

## 2023-12-08 PROCEDURE — 1160F RVW MEDS BY RX/DR IN RCRD: CPT | Mod: CPTII,S$GLB,, | Performed by: FAMILY MEDICINE

## 2023-12-08 PROCEDURE — 99396 PR PREVENTIVE VISIT,EST,40-64: ICD-10-PCS | Mod: S$GLB,,, | Performed by: FAMILY MEDICINE

## 2023-12-08 PROCEDURE — 99999 PR PBB SHADOW E&M-EST. PATIENT-LVL IV: ICD-10-PCS | Mod: PBBFAC,,, | Performed by: FAMILY MEDICINE

## 2023-12-08 PROCEDURE — 3008F BODY MASS INDEX DOCD: CPT | Mod: CPTII,S$GLB,, | Performed by: FAMILY MEDICINE

## 2023-12-08 PROCEDURE — 1159F MED LIST DOCD IN RCRD: CPT | Mod: CPTII,S$GLB,, | Performed by: FAMILY MEDICINE

## 2023-12-08 PROCEDURE — 3044F HG A1C LEVEL LT 7.0%: CPT | Mod: CPTII,S$GLB,, | Performed by: FAMILY MEDICINE

## 2023-12-08 PROCEDURE — 3044F PR MOST RECENT HEMOGLOBIN A1C LEVEL <7.0%: ICD-10-PCS | Mod: CPTII,S$GLB,, | Performed by: FAMILY MEDICINE

## 2023-12-08 PROCEDURE — 3079F PR MOST RECENT DIASTOLIC BLOOD PRESSURE 80-89 MM HG: ICD-10-PCS | Mod: CPTII,S$GLB,, | Performed by: FAMILY MEDICINE

## 2023-12-08 PROCEDURE — 3008F PR BODY MASS INDEX (BMI) DOCUMENTED: ICD-10-PCS | Mod: CPTII,S$GLB,, | Performed by: FAMILY MEDICINE

## 2023-12-08 PROCEDURE — 3075F PR MOST RECENT SYSTOLIC BLOOD PRESS GE 130-139MM HG: ICD-10-PCS | Mod: CPTII,S$GLB,, | Performed by: FAMILY MEDICINE

## 2023-12-08 RX ORDER — ESCITALOPRAM OXALATE 20 MG/1
20 TABLET ORAL DAILY
Qty: 90 TABLET | Refills: 0 | Status: SHIPPED | OUTPATIENT
Start: 2023-12-08

## 2023-12-08 RX ORDER — APIXABAN 5 MG/1
5 TABLET, FILM COATED ORAL 2 TIMES DAILY
Qty: 180 TABLET | Refills: 3 | Status: SHIPPED | OUTPATIENT
Start: 2023-12-08

## 2023-12-08 RX ORDER — ATORVASTATIN CALCIUM 80 MG/1
80 TABLET, FILM COATED ORAL DAILY
Qty: 90 TABLET | Refills: 3 | Status: SHIPPED | OUTPATIENT
Start: 2023-12-08

## 2023-12-08 NOTE — PATIENT INSTRUCTIONS
Water: 80oz/day  Miralax for constipation    Nexium or Prilosec (otc) 2 week pack. If any continued symptoms, try Pepcid 40mg AM.    For dryness, can try Lubrin or Replens, [facility].     Dr Ney Mcgregor ([facility])

## 2023-12-08 NOTE — PROGRESS NOTES
Subjective:       Patient ID: Janice Ramos is a 58 y.o. female.    Chief Complaint: Annual Exam      Janice Ramos is in the office for annual exam.    HPI  Medical hx reviewed.   Past Medical History:   Diagnosis Date    Anxiety     Bell's palsy 2007    R eye with residual deficit    Colon polyp     Fatigue     Hypercholesteremia     Stroke 12/2014    Vertigo        Current Outpatient Medications:     b complex vitamins tablet, Take 1 tablet by mouth once daily., Disp: , Rfl:     cetirizine (ZYRTEC) 10 MG tablet, Take 10 mg by mouth once daily., Disp: , Rfl:     fluticasone propionate (FLONASE) 50 mcg/actuation nasal spray, 1 spray (50 mcg total) by Each Nostril route once daily., Disp: 16 g, Rfl: 0    multivitamin (THERAGRAN) per tablet, Take 1 tablet by mouth once daily., Disp: , Rfl:     atorvastatin (LIPITOR) 80 MG tablet, Take 1 tablet (80 mg total) by mouth once daily., Disp: 90 tablet, Rfl: 3    diclofenac sodium (VOLTAREN) 1 % Gel, Apply 2 g topically as needed. , Disp: , Rfl:     ELIQUIS 5 mg Tab, Take 1 tablet (5 mg total) by mouth 2 (two) times daily., Disp: 180 tablet, Rfl: 3    EScitalopram oxalate (LEXAPRO) 20 MG tablet, Take 1 tablet (20 mg total) by mouth once daily., Disp: 90 tablet, Rfl: 0    ondansetron (ZOFRAN-ODT) 4 MG TbDL, , Disp: , Rfl:     The 10-year ASCVD risk score (Radha DK, et al., 2019) is: 4.1%    Values used to calculate the score:      Age: 58 years      Sex: Female      Is Non- : No      Diabetic: No      Tobacco smoker: No      Systolic Blood Pressure: 130 mmHg      Is BP treated: No      HDL Cholesterol: 44 mg/dL      Total Cholesterol: 257 mg/dL     Lab Results   Component Value Date    HGBA1C 4.7 12/27/2021    HGBA1C 4.7 06/25/2018     Lab Results   Component Value Date    GLUF 80 12/27/2021    LDLCALC 181.6 (H) 12/27/2021    CREATININE 0.8 06/01/2020   Labs 2021 rev.     Review of Systems   Constitutional:  Negative for fatigue and  unexpected weight change.   HENT:  Negative for congestion, postnasal drip, rhinorrhea and sinus pressure.         Maintains flonase and zyrtec   Respiratory:  Negative for apnea (neg psg in 3/2014), cough and shortness of breath.    Cardiovascular:  Negative for chest pain and leg swelling.   Gastrointestinal:  Positive for constipation. Negative for abdominal pain, blood in stool and diarrhea.        No gerd c/o, but increase in belching/gas.    Genitourinary:  Positive for frequency. Negative for difficulty urinating, dysuria and urgency.        A few uti's/year   Musculoskeletal:  Negative for arthralgias, back pain (lower back and hip can bother her with some activities) and gait problem.   Skin:  Negative for color change and rash.   Neurological:  Negative for dizziness, weakness, light-headedness and headaches (occ).   Psychiatric/Behavioral:  Negative for dysphoric mood and sleep disturbance (light sleeper).        Objective:      Physical Exam  Vitals and nursing note reviewed.   Constitutional:       General: She is not in acute distress.     Appearance: Normal appearance. She is well-developed. She is obese.   HENT:      Head: Normocephalic and atraumatic.      Right Ear: Tympanic membrane and external ear normal.      Left Ear: Tympanic membrane and external ear normal.      Nose: Nose normal.      Mouth/Throat:      Pharynx: No oropharyngeal exudate.   Eyes:      Conjunctiva/sclera: Conjunctivae normal.      Pupils: Pupils are equal, round, and reactive to light.   Neck:      Thyroid: No thyromegaly.   Cardiovascular:      Rate and Rhythm: Normal rate and regular rhythm.   Pulmonary:      Effort: Pulmonary effort is normal. No respiratory distress.      Breath sounds: Normal breath sounds. No wheezing.   Abdominal:      General: Bowel sounds are normal. There is no distension.      Palpations: Abdomen is soft. There is no mass.      Tenderness: There is no abdominal tenderness. There is no guarding or  rebound.   Musculoskeletal:         General: No signs of injury.      Cervical back: Neck supple.      Right lower leg: No edema.      Left lower leg: No edema.   Lymphadenopathy:      Cervical: No cervical adenopathy.   Skin:     General: Skin is warm and dry.   Neurological:      General: No focal deficit present.      Mental Status: She is alert and oriented to person, place, and time.      Cranial Nerves: No cranial nerve deficit.   Psychiatric:         Mood and Affect: Mood normal.         Behavior: Behavior normal.             Screening recommendations appropriate to age and health status were reviewed.    Assessment & Plan:    Routine general medical examination at a health care facility  Comments:  anticipatory guidance reviewed  Orders:  -     CBC Without Differential; Future; Expected date: 12/08/2023  -     Comprehensive Metabolic Panel; Future; Expected date: 12/08/2023  -     Hemoglobin A1C; Future; Expected date: 12/08/2023  -     Lipid Panel; Future; Expected date: 12/08/2023  -     TSH; Future; Expected date: 12/08/2023  -     Urinalysis, Reflex to Urine Culture Urine, Clean Catch; Future    History of stroke  Comments:  cont eliquis    Obesity, unspecified classification, unspecified obesity type, unspecified whether serious comorbidity present  Comments:  encouraged healthy approach to weight with diet/exercise    Other orders  -     atorvastatin (LIPITOR) 80 MG tablet; Take 1 tablet (80 mg total) by mouth once daily.  Dispense: 90 tablet; Refill: 3  -     ELIQUIS 5 mg Tab; Take 1 tablet (5 mg total) by mouth 2 (two) times daily.  Dispense: 180 tablet; Refill: 3  -     EScitalopram oxalate (LEXAPRO) 20 MG tablet; Take 1 tablet (20 mg total) by mouth once daily.  Dispense: 90 tablet; Refill: 0

## 2023-12-11 ENCOUNTER — LAB VISIT (OUTPATIENT)
Dept: LAB | Facility: HOSPITAL | Age: 58
End: 2023-12-11
Attending: FAMILY MEDICINE
Payer: COMMERCIAL

## 2023-12-11 DIAGNOSIS — Z00.00 ROUTINE GENERAL MEDICAL EXAMINATION AT A HEALTH CARE FACILITY: ICD-10-CM

## 2023-12-11 PROCEDURE — 83036 HEMOGLOBIN GLYCOSYLATED A1C: CPT | Performed by: FAMILY MEDICINE

## 2023-12-11 PROCEDURE — 85027 COMPLETE CBC AUTOMATED: CPT | Performed by: FAMILY MEDICINE

## 2023-12-11 PROCEDURE — 36415 COLL VENOUS BLD VENIPUNCTURE: CPT | Mod: PN | Performed by: FAMILY MEDICINE

## 2023-12-11 PROCEDURE — 84443 ASSAY THYROID STIM HORMONE: CPT | Performed by: FAMILY MEDICINE

## 2023-12-11 PROCEDURE — 80061 LIPID PANEL: CPT | Performed by: FAMILY MEDICINE

## 2023-12-11 PROCEDURE — 80053 COMPREHEN METABOLIC PANEL: CPT | Performed by: FAMILY MEDICINE

## 2023-12-12 LAB
ALBUMIN SERPL BCP-MCNC: 3.7 G/DL (ref 3.5–5.2)
ALP SERPL-CCNC: 129 U/L (ref 55–135)
ALT SERPL W/O P-5'-P-CCNC: 15 U/L (ref 10–44)
ANION GAP SERPL CALC-SCNC: 13 MMOL/L (ref 8–16)
AST SERPL-CCNC: 18 U/L (ref 10–40)
BILIRUB SERPL-MCNC: 0.6 MG/DL (ref 0.1–1)
BUN SERPL-MCNC: 20 MG/DL (ref 6–20)
CALCIUM SERPL-MCNC: 9.5 MG/DL (ref 8.7–10.5)
CHLORIDE SERPL-SCNC: 107 MMOL/L (ref 95–110)
CHOLEST SERPL-MCNC: 218 MG/DL (ref 120–199)
CHOLEST/HDLC SERPL: 5.2 {RATIO} (ref 2–5)
CO2 SERPL-SCNC: 22 MMOL/L (ref 23–29)
CREAT SERPL-MCNC: 0.8 MG/DL (ref 0.5–1.4)
ERYTHROCYTE [DISTWIDTH] IN BLOOD BY AUTOMATED COUNT: 13.2 % (ref 11.5–14.5)
EST. GFR  (NO RACE VARIABLE): >60 ML/MIN/1.73 M^2
ESTIMATED AVG GLUCOSE: 85 MG/DL (ref 68–131)
GLUCOSE SERPL-MCNC: 79 MG/DL (ref 70–110)
HBA1C MFR BLD: 4.6 % (ref 4–5.6)
HCT VFR BLD AUTO: 43.9 % (ref 37–48.5)
HDLC SERPL-MCNC: 42 MG/DL (ref 40–75)
HDLC SERPL: 19.3 % (ref 20–50)
HGB BLD-MCNC: 14.1 G/DL (ref 12–16)
LDLC SERPL CALC-MCNC: 154.6 MG/DL (ref 63–159)
MCH RBC QN AUTO: 30.8 PG (ref 27–31)
MCHC RBC AUTO-ENTMCNC: 32.1 G/DL (ref 32–36)
MCV RBC AUTO: 96 FL (ref 82–98)
NONHDLC SERPL-MCNC: 176 MG/DL
PLATELET # BLD AUTO: 288 K/UL (ref 150–450)
PMV BLD AUTO: 10.8 FL (ref 9.2–12.9)
POTASSIUM SERPL-SCNC: 3.9 MMOL/L (ref 3.5–5.1)
PROT SERPL-MCNC: 6.8 G/DL (ref 6–8.4)
RBC # BLD AUTO: 4.58 M/UL (ref 4–5.4)
SODIUM SERPL-SCNC: 142 MMOL/L (ref 136–145)
TRIGL SERPL-MCNC: 107 MG/DL (ref 30–150)
TSH SERPL DL<=0.005 MIU/L-ACNC: 2.71 UIU/ML (ref 0.4–4)
WBC # BLD AUTO: 7.76 K/UL (ref 3.9–12.7)

## 2024-03-15 NOTE — PROGRESS NOTES
LINKS immunization registry not responding  Care Everywhere updated  Health Maintenance updated  Chart reviewed for overdue Proactive [facility] ([facility]) health maintenance testing (CRS, Breast Ca, Diabetic Eye Exam)   Orders entered:N/A   Faxed via EPIC

## 2024-05-30 RX ORDER — ESCITALOPRAM OXALATE 20 MG/1
20 TABLET ORAL
Qty: 90 TABLET | Refills: 1 | Status: SHIPPED | OUTPATIENT
Start: 2024-05-30

## 2024-05-31 NOTE — TELEPHONE ENCOUNTER
No care due was identified.  Health [facility] Embedded Care Due Messages. Reference number: [id number].   5/30/2024 8:02:39 PM CDT

## 2024-05-31 NOTE — TELEPHONE ENCOUNTER
Refill Decision Note   Janice Ramos  is requesting a refill authorization.  Brief Assessment and Rationale for Refill:  Approve     Medication Therapy Plan:         Comments:     Note composed:8:04 PM 05/30/2024

## 2024-09-23 NOTE — TELEPHONE ENCOUNTER
Pt was put on eliquis 5mg and is wondering if she should still be taking or lower her dose.   She is currently taking it 2 pills BID.  Also wondering if she should have a follow up ultrasound.   She states that her ankle is still painful and swollen    Please advise   No indicators present

## 2025-01-15 DIAGNOSIS — Z12.31 OTHER SCREENING MAMMOGRAM: ICD-10-CM

## 2025-02-06 NOTE — TELEPHONE ENCOUNTER
----- Message from Jeannette sent at 2025  4:14 PM CST -----  Type:  Pharmacy Calling to Clarify an RX    Name of Caller:  pt  Pharmacy Name:    [id number] [place] [address]  [address]  [place] [address]  Phone: [phone] Fax: [phone]     Prescription Name:  ELIQUIS 5 mg Tab   What do they need to clarify?:  pharmacy said reach out to doctor  Best Call Back Number:  [phone]   Additional Information:  pt put in request for refill and pharmacy said prescription . Pt is on last pill. Please call back to advise. Thanks!

## 2025-02-07 NOTE — TELEPHONE ENCOUNTER
Please approve for ELIQUIS 5 mg Tab   Last OV 12/08/23  Last refill date 12/08/23  Last labs 12/11/23  Next appt 02/11/25    Pt is out medication and asking for a temp. Refill to get her to appt.

## 2025-02-10 RX ORDER — APIXABAN 5 MG/1
5 TABLET, FILM COATED ORAL 2 TIMES DAILY
Qty: 180 TABLET | Refills: 3 | Status: SHIPPED | OUTPATIENT
Start: 2025-02-10

## 2025-02-10 RX ORDER — APIXABAN 5 MG/1
5 TABLET, FILM COATED ORAL 2 TIMES DAILY
Qty: 180 TABLET | Refills: 0 | Status: SHIPPED | OUTPATIENT
Start: 2025-02-10

## 2025-02-10 NOTE — TELEPHONE ENCOUNTER
Refill Routing Note   Medication(s) are not appropriate for processing by [facility] for the following reason(s):        Outside of protocol    [facility] action(s):  Route             Appointments  past 12m or future 3m with PCP    Date Provider   Last Visit   12/8/2023 Emily Nassar MD   Next Visit   Visit date not found Emily Nassar MD   ED visits in past 90 days: 0        Note composed:1:24 PM 02/10/2025

## 2025-02-26 ENCOUNTER — TELEPHONE (OUTPATIENT)
Dept: FAMILY MEDICINE | Facility: CLINIC | Age: 60
End: 2025-02-26
Payer: COMMERCIAL

## 2025-02-26 NOTE — TELEPHONE ENCOUNTER
Attempted to reach pt to help with rescheduling appt that was on the 6th with Dr. Nassar. Please help patient with rescheduling, appointment has been canceled.

## 2025-04-28 RX ORDER — ESCITALOPRAM OXALATE 20 MG/1
20 TABLET ORAL
Qty: 30 TABLET | Refills: 0 | Status: SHIPPED | OUTPATIENT
Start: 2025-04-28

## 2025-04-28 NOTE — TELEPHONE ENCOUNTER
Refill Routing Note   Medication(s) are not appropriate for processing by [facility] for the following reason(s):        Patient not seen by provider within 15 months    [facility] action(s):  Defer   Requires appointment : Yes   Requires labs : Yes             Appointments  past 12m or future 3m with PCP    Date Provider   Last Visit   12/8/2023 Emily Nassar MD   Next Visit   Visit date not found Emily Nassar MD   ED visits in past 90 days: 0        Note composed:10:12 AM 04/28/2025

## 2025-07-05 NOTE — TELEPHONE ENCOUNTER
Care Due:                  Date            Visit Type   Department     Provider  --------------------------------------------------------------------------------                                EP -                              PRIMARY      [facility] FAMILY  Last Visit: 12-      CARE ([facility])   MEDICINE       Emily Nassar  Next Visit: None Scheduled  None         None Found                                                            Last  Test          Frequency    Reason                     Performed    Due Date  --------------------------------------------------------------------------------    Office Visit  15 months..  DARNELL EScitalopram,     12- 03-                             atorvastatin.............    CBC.........  12 months..  ELIQUIS..................  12- 12-    CMP.........  12 months..  ELIQURENATA, atorvastatin....  12- 12-    Lipid Panel.  12 months..  atorvastatin.............  12- 12-    Health [facility] Embedded Care Due Messages. Reference number: [id number].   7/04/2025 9:57:07 PM CDT

## 2025-07-07 NOTE — TELEPHONE ENCOUNTER
Refill Routing Note   Medication(s) are not appropriate for processing by [facility] for the following reason(s):        Patient not seen by provider within 15 months    [facility] action(s):  Defer   Requires appointment : Yes     Requires labs : Yes             Appointments  past 12m or future 3m with PCP    Date Provider   Last Visit   12/8/2023 Emily Nassar MD   Next Visit   Visit date not found Emily Nassar MD   ED visits in past 90 days: 0        Note composed:12:30 PM 07/07/2025

## 2025-07-08 RX ORDER — ESCITALOPRAM OXALATE 20 MG/1
20 TABLET ORAL
Qty: 30 TABLET | Refills: 0 | Status: SHIPPED | OUTPATIENT
Start: 2025-07-08

## 2025-08-19 ENCOUNTER — PATIENT MESSAGE (OUTPATIENT)
Dept: ADMINISTRATIVE | Facility: HOSPITAL | Age: 60
End: 2025-08-19
Payer: COMMERCIAL